# Patient Record
Sex: FEMALE | Race: WHITE | NOT HISPANIC OR LATINO | Employment: UNEMPLOYED | URBAN - METROPOLITAN AREA
[De-identification: names, ages, dates, MRNs, and addresses within clinical notes are randomized per-mention and may not be internally consistent; named-entity substitution may affect disease eponyms.]

---

## 2017-03-20 ENCOUNTER — LAB REQUISITION (OUTPATIENT)
Dept: LAB | Facility: HOSPITAL | Age: 22
End: 2017-03-20
Payer: COMMERCIAL

## 2017-03-20 ENCOUNTER — ALLSCRIPTS OFFICE VISIT (OUTPATIENT)
Dept: OTHER | Facility: OTHER | Age: 22
End: 2017-03-20

## 2017-03-20 DIAGNOSIS — R32 URINARY INCONTINENCE: ICD-10-CM

## 2017-03-20 DIAGNOSIS — R10.11 RIGHT UPPER QUADRANT PAIN: ICD-10-CM

## 2017-03-20 DIAGNOSIS — R10.2 PELVIC AND PERINEAL PAIN: ICD-10-CM

## 2017-03-20 LAB
CLARITY UR: NORMAL
COLOR UR: YELLOW
GLUCOSE (HISTORICAL): NORMAL
HGB UR QL STRIP.AUTO: NORMAL
KETONES UR STRIP-MCNC: NORMAL MG/DL
LEUKOCYTE ESTERASE UR QL STRIP: NORMAL
NITRITE UR QL STRIP: NORMAL
PH UR STRIP.AUTO: 6 [PH]
SP GR UR STRIP.AUTO: 1.03

## 2017-03-20 PROCEDURE — 87660 TRICHOMONAS VAGIN DIR PROBE: CPT | Performed by: NURSE PRACTITIONER

## 2017-03-20 PROCEDURE — 87086 URINE CULTURE/COLONY COUNT: CPT | Performed by: NURSE PRACTITIONER

## 2017-03-20 PROCEDURE — 87480 CANDIDA DNA DIR PROBE: CPT | Performed by: NURSE PRACTITIONER

## 2017-03-20 PROCEDURE — 87510 GARDNER VAG DNA DIR PROBE: CPT | Performed by: NURSE PRACTITIONER

## 2017-03-21 LAB
BACTERIA UR CULT: NORMAL
CANDIDA RRNA VAG QL PROBE: NEGATIVE
G VAGINALIS RRNA GENITAL QL PROBE: NEGATIVE
T VAGINALIS RRNA GENITAL QL PROBE: NEGATIVE

## 2017-03-24 ENCOUNTER — HOSPITAL ENCOUNTER (OUTPATIENT)
Dept: ULTRASOUND IMAGING | Facility: HOSPITAL | Age: 22
Discharge: HOME | End: 2017-03-24
Payer: COMMERCIAL

## 2017-03-24 DIAGNOSIS — R10.2 PELVIC AND PERINEAL PAIN: ICD-10-CM

## 2017-03-24 DIAGNOSIS — R32 URINARY INCONTINENCE: ICD-10-CM

## 2017-03-24 PROCEDURE — 76830 TRANSVAGINAL US NON-OB: CPT

## 2017-03-24 PROCEDURE — 76856 US EXAM PELVIC COMPLETE: CPT

## 2017-03-30 ENCOUNTER — ALLSCRIPTS OFFICE VISIT (OUTPATIENT)
Dept: OTHER | Facility: OTHER | Age: 22
End: 2017-03-30

## 2017-08-28 ENCOUNTER — LAB REQUISITION (OUTPATIENT)
Dept: LAB | Facility: HOSPITAL | Age: 22
End: 2017-08-28
Payer: COMMERCIAL

## 2017-08-28 ENCOUNTER — ALLSCRIPTS OFFICE VISIT (OUTPATIENT)
Dept: OTHER | Facility: OTHER | Age: 22
End: 2017-08-28

## 2017-08-28 DIAGNOSIS — Z01.419 ENCOUNTER FOR GYNECOLOGICAL EXAMINATION WITHOUT ABNORMAL FINDING: ICD-10-CM

## 2017-08-28 DIAGNOSIS — L83 ACANTHOSIS NIGRICANS: ICD-10-CM

## 2017-08-28 LAB
EST. AVERAGE GLUCOSE BLD GHB EST-MCNC: 117 MG/DL
HBA1C MFR BLD: 5.7 % (ref 4.2–6.3)

## 2017-08-28 PROCEDURE — 83036 HEMOGLOBIN GLYCOSYLATED A1C: CPT | Performed by: OBSTETRICS & GYNECOLOGY

## 2017-08-28 PROCEDURE — G0145 SCR C/V CYTO,THINLAYER,RESCR: HCPCS | Performed by: OBSTETRICS & GYNECOLOGY

## 2017-09-01 ENCOUNTER — GENERIC CONVERSION - ENCOUNTER (OUTPATIENT)
Dept: OTHER | Facility: OTHER | Age: 22
End: 2017-09-01

## 2017-09-05 LAB
LAB AP GYN PRIMARY INTERPRETATION: NORMAL
Lab: NORMAL

## 2017-09-07 ENCOUNTER — GENERIC CONVERSION - ENCOUNTER (OUTPATIENT)
Dept: OTHER | Facility: OTHER | Age: 22
End: 2017-09-07

## 2018-01-11 NOTE — RESULT NOTES
Verified Results  (Q) BV-VAGINITIS PANEL DNA PROBE [id number] 12:00AM Tommie Chisholm     Test Name Result Flag Reference   BV/VAGINITIS [facility] PROBE      BV/VAGINITIS PANEL DNA PROBE         MICRO NUMBER:      [id number]    TEST STATUS:       FINAL    SPECIMEN SOURCE:   VAGINAL/ANORECTAL    SPECIMEN QUALITY:  ADEQUATE    TRICHOMONAS:       Not Detected    GARDNERELLA:       Not Detected    KAREN:           Not Detected

## 2018-01-11 NOTE — PROGRESS NOTES
2016         RE: Ole Sheets                                To: Ob/Gyn Care   [facility]   MR#: [id number]                                   [address]   : Brianna Jernigan [facility], [address]: [id number]                             Fax: [phone]   (Exam #: [id number])      The LMP of this 21year old,  G1, P0-0-0-0 patient was unknown, her   working AUSTYN is SEP 1 2016 and the current gestational age is 26 weeks 6   days by 1st Trimester Sono  A sonographic examination was performed on 2016 using real time equipment  The ultrasound examination was   performed using abdominal technique  The patient has a BMI of 39 0  Her   blood pressure today was 120/70  MFM ultrasound 16   13w4d   16 AUSTYN      Cardiac motion was observed at 132 bpm       INDICATIONS      Interval growth assesment   hypothyroidism   obesity   size greater than dates      Exam Types      Level I      RESULTS      Fetus # 1 of 1   Vertex presentation   Fetal growth appeared normal   Placenta Location = Anterior   No placenta previa   Placenta Grade = II      MEASUREMENTS (* Included In Average GA)      AC              29 2 cm        33 weeks 2 days* (53%)   BPD              8 8 cm        35 weeks 2 days* (85%)   HC              30 5 cm        33 weeks 4 days* (45%)   Femur            6 3 cm        32 weeks 4 days* (46%)      Cerebellum       4 1 cm        34 weeks 1 day      HC/AC           1 04   FL/AC           0 22   FL/BPD          0 72   EFW (Ac/Fl/Hc)  2132 grams - 4 lbs 11 oz                 (47%)      THE AVERAGE GESTATIONAL AGE is 33 weeks 5 days +/- 21 days        AMNIOTIC FLUID      Q1: 2 9      Q2: 4 9      Q3: 1 7      Q4: 4 2   NINA Total = 13 8 cm   Amniotic Fluid: Normal      ANATOMY DETAILS      Visualized Appearing Sonographically Normal:   HEAD: (Calvarium, BPD Level, Cavum, Lateral Ventricles, Choroid Plexus, Cerebellum, Cisterna Magna);    TH  CAV : (Diaphragm); HEART: (Four   Chamber View, Interventricular Septum, Interatrial Septum, Cardiac Axis,   Cardiac Position);    STOMACH, RIGHT KIDNEY, LEFT KIDNEY, BLADDER, PLACENTA      IMPRESSION      Whyte IUP   33 weeks and 5 days by this ultrasound  (AUSTYN=AUG 26 2016)   32 weeks and 6 days by 1st Tri Sono  (AUSTYN=SEP 1 2016)   Vertex presentation   Fetal growth appeared normal   Regular fetal heart rate of 132 bpm   Anterior placenta   No placenta previa      GENERAL COMMENT         I had the pleasure of seeing Ashley Park  in the [address] [place] [address] today   for followup growth scan  She reports normal daily fetal movements  She   denies any vaginal bleeding, leakage of fluid, or significant contractions   or pelvic pressure  There has been no major pregnancy complications since   her last visit here in the  Center  She is hypothyroid and is   maintained on Synthroid  She does have a BMI of 39  She denies any   pregnancy complications  On today's ultrasound, the fetus was in a vertex presentation  The   amniotic fluid appeared very normal today  Fetal growth was within normal   range  The overall size of the fetus was at the 47th percentile and the   abdominal circumference was at the 53rd percentile which are both very   normal  The interval anatomy appeared very appropriate with no obvious   anomalies seen today  We discussed the importance of initiating fetal kick counting at least   once daily  We discussed the "10 kicks in 2 hour rule"  I instructed her   to report to you immediately should criteria not be met  Kick counts   should begin at 28 weeks gestation  No further ultrasounds are scheduled for Anila at this time  Certainly we   be happy to see her again if there are any concerns during her pregnancy  I wished her well for safe and healthy delivery  Total face-to-face time   with the patient, excluding ultrasound time was 10 minutes with more than   50% of the time devoted to counseling and coordination of care  MANUEL Wu M D     Maternal-Fetal Medicine   Electronically signed 07/13/16 09:21

## 2018-01-11 NOTE — MISCELLANEOUS
Message  Return to work or school:   Nina Myrick is under my professional care  She was seen in my office on 06/23/2016  Please allow patient to use bathroom as needed  AUSTYN 09/01/2016               Signatures   Electronically signed by : CLIFTON Santos ; Jun 23 2016  3:40PM EST                       (Author)

## 2018-01-13 NOTE — MISCELLANEOUS
Message  Return to work or school:      She is able to return to school on 08/24/2016  Patient is able to attend school but is unable to perform any work during clinicals  Patient can observe but must be sitting  Patient is s/p vaginal deliver 08/17/2016  Weight Bearing Status: No Weight-Bearing           Signatures   Electronically signed by : Tarik Cam, ; Aug 29 2016 10:18AM EST                       (Author)

## 2018-01-14 VITALS
SYSTOLIC BLOOD PRESSURE: 140 MMHG | DIASTOLIC BLOOD PRESSURE: 70 MMHG | BODY MASS INDEX: 34.2 KG/M2 | WEIGHT: 205.25 LBS | HEIGHT: 65 IN

## 2018-01-14 VITALS
WEIGHT: 198 LBS | DIASTOLIC BLOOD PRESSURE: 72 MMHG | BODY MASS INDEX: 32.99 KG/M2 | SYSTOLIC BLOOD PRESSURE: 112 MMHG | HEIGHT: 65 IN

## 2018-01-14 VITALS
SYSTOLIC BLOOD PRESSURE: 118 MMHG | DIASTOLIC BLOOD PRESSURE: 80 MMHG | WEIGHT: 203.44 LBS | BODY MASS INDEX: 34.38 KG/M2

## 2018-01-15 NOTE — PROGRESS NOTES
Chief Complaint  The patient was in today for her tsh blood draw  Active Problems    1  Bacterial vaginosis (616 10,041 9) (N76 0,B96 89)   2  Candidiasis (112 9) (B37 9)   3  Contraception management (V25 9) (Z30 9)   4  First trimester bleeding (640 93) (O20 9)   5  Hypothyroidism (244 9) (E03 9)   6  Nexplanon removal (V25 43) (Z30 49)   7  Pregnancy (V22 2) (Z33 1)    Current Meds   1  Fluconazole 150 MG Oral Tablet; take one tablet now and repeat in 4 days; Therapy: 21Apr2016 to (Evaluate:23Apr2016)  Requested for: 21Apr2016; Last   Rx:21Apr2016 Ordered   2  Levothyroxine Sodium 100 MCG Oral Tablet; TAKE 1 TABLET DAILY; Therapy: 00Ktx8981 to (Evaluate:25Rej3645)  Requested for: 15Apr2016; Last   Rx:15Apr2016 Ordered   3  Levothyroxine Sodium 75 MCG Oral Tablet; TAKE 1 TABLET DAILY AS DIRECTED; Therapy: (Recorded:15Apr2016) to Recorded   4  Prenatal Vitamins 0 8 MG Oral Tablet; TAKE 1 TABLET DAILY; Therapy: 99OAK1200 to (Evaluate:14Jan2017)  Requested for: 20Jan2016; Last   Rx:20Jan2016 Ordered    Allergies    1  No Known Drug Allergies    2  Seasonal    Plan  Hypothyroidism, Pregnancy    · (1) TSH; Status:Active;  Requested for:05May2016;     Future Appointments    Date/Time Provider Specialty Site   05/19/2016 11:30 AM STEPHANIE Wall[facility]     Signatures   Electronically signed by : CLIFTON Musa ; May  5 2016  3:56PM EST                       (Author)

## 2018-01-15 NOTE — MISCELLANEOUS
Message  Message Free Text Note Form: The pt paged shortly before midnight crying reporting that she was walking up the stairs and tripped and hit her abdomen fairly hard  She reports she is 37 weeks  She denies vb or lof, but reports she doesn't really feel the baby moving very well  The pt was advised to come to the hospital for evaluation and prolonged monitoring  L&D notified of the same        Signatures   Electronically signed by : CLIFTON Castanon ; Aug 13 2016  8:58PM EST                       (Author)

## 2018-01-15 NOTE — PROGRESS NOTES
APR 15 2016         RE: Camille Singletary                                To: Ob/Gyn Care   [facility]   MR#: [id number]                                   [address] [facility] [address]   : [address], [address] [place] [address]    ENC: [id number]                             Fax: [phone]   (Exam #: [id number])      The LMP of this 21year old,  1, para 0 patient was unknown, her   working AUSTYN is SEP 1 2016 and the current gestational age is 25 weeks 1   day by [address] [place] [address]  A sonographic examination was performed on APR   15 2016 using real time equipment  The ultrasound examination was   performed using abdominal & vaginal techniques  The patient has a BMI of   34 0  Her blood pressure today was 121/63        MFM ultrasound 16   13w4d   16 AUSTYN      Cardiac motion was observed at 172 bpm       INDICATIONS      fetal anatomical survey   hypothyroidism   obesity      Exam Types      LEVEL II   Transvaginal      RESULTS      Fetus # 1 of 1   Vertex presentation   Fetal growth appeared normal   Placenta Location = Anterior   No placenta previa   Placenta Grade = I      MEASUREMENTS (* Included In Average GA)      AC              17 0 cm        21 weeks 5 days* (83%)   BPD              5 3 cm        21 weeks 6 days* (>95%)   HC              19 0 cm        21 weeks 1 day * (78%)   Femur            3 5 cm        21 weeks 2 days* (67%)      Nuchal Fold      5 6 mm      Humerus          3 5 cm        21 weeks 6 days  (91%)   Radius           3 1 cm        23 weeks 1 day   Ulna             3 3 cm        22 weeks 4 days   Tibia            3 1 cm        21 weeks 4 days  (86%)   Fibula           3 2 cm        20 weeks 5 days   Foot             3 4 cm        20 weeks 3 days      Cerebellum       2 2 cm        21 weeks 4 days   Biorbit          3 4 cm        21 weeks 4 days   CisternaMagna    5 4 mm      HC/AC           1 12   FL/AC           0 21 FL/BPD          0 67   EFW (Ac/Fl/Hc)   429 grams - 0 lbs 15 oz      THE AVERAGE GESTATIONAL AGE is 21 weeks 4 days +/- 10 days  AMNIOTIC FLUID      Largest Vertical Pocket = 4 4 cm   Amniotic Fluid: Normal      ANATOMY      Head                                    Normal   Face/Neck                               Normal   Th  Cav  Normal   Heart                                   Normal   Abd  Cav  Normal   Stomach                                 Normal   Right Kidney                            Normal   Left Kidney                             Normal   Bladder                                 Normal   Abd  Wall                               Normal   Spine                                   Normal   Extrems                                 Normal   Genitalia                               Normal   Placenta                                Normal   Umbl  Cord                              Normal   Uterus                                  Normal      ANATOMY DETAILS      Visualized Appearing Sonographically Normal:   HEAD: (Calvarium, BPD Level, Lateral Ventricles, Choroid Plexus,   Cerebellum, Cisterna Magna);    FACE/NECK: (Neck, Nuchal Fold, Profile,   Orbits, Nose/Lips, Palate, Face);    TH  CAV : (Diaphragm); HEART:   (Four Chamber View, Proximal Left Outflow, Proximal Right Outflow, Aortic   Arch, Ductal Arch, Short Axis of Greater Vessels, Cardiac Axis,   Interventricular Septum, Interatrial Septum, Cardiac Position);    ABD    CAV , STOMACH, RIGHT KIDNEY, LEFT KIDNEY, BLADDER, ABD  WALL, SPINE:   (Cervical Spine, Thoracic Spine, Lumbar Spine, Sacrum);    EXTREMS: (Lt   Humerus, Rt Humerus, Lt Forearm, Rt Forearm, Lt Hand, Rt Hand, Lt Femur,   Rt Femur, Lt Low Leg, Rt Low Leg, Lt Foot, Rt Foot);    GENITALIA,   PLACENTA, UMBL   CORD, UTERUS      ANATOMY COMMENTS      No fetal structural abnormality or ultrasound marker for aneuploidy is   identified on the Level II ultrasound study today  The genitalia were   reviewed and found to be male  The patient is  aware of the results of the   fetal sex  Fetal growth and amniotic fluid volume appear normal   Active   movement of the fetal body & extremities was seen  There is no suspicion   of a subchorionic bleed  The placental cord insertion was normal   The   cervix seen by transvaginal scan appears normal measuring 34 mms  There is   no evidence for spontaneous funneling of the cervix seen  ADNEXA      The left ovary appeared normal and measured 2 8 x 2 3 x 2 3 cm with a   volume of 7 7 cc  The right ovary appeared normal and measured 3 0 x 2 3 x   1 7 cm with a volume of 6 1 cc  IMPRESSION      Whyte IUP   21 weeks and 4 days by this ultrasound  (AUSTYN=AUG 22 2016)   20 weeks and 1 day by 1st Tri Sono  (AUSTYN=SEP 1 2016)   Vertex presentation   Fetal growth appeared normal   Normal anatomy survey   Regular fetal heart rate of 172 bpm   Anterior placenta   No placenta previa      GENERAL COMMENT         I had the pleasure of seeing Myke Live in the  Center on   April 15 for a level II ultrasound  She is currently 20 weeks and one day   with a working due date of   She denies any pregnancy   complications  She does have a history of hypothyroidism  She denies any   pregnancy complications  Today's ultrasound was reassuring  A viable fetus was seen in the    presentation  The placenta is anterior in location and there is no   evidence of a placenta previa  Amniotic fluid appeared normal  Fetal   growth appeared very appropriate and correlated well with her due date  There were no obvious anomalies seen today  The anatomic survey was   completed today  There were no Down syndrome markers seen  Both maternal   ovaries were seen and appeared normal  There were no obvious subchorionic   hematomas or uterine myomas    Her cervix was seen transvaginally and   appeared normal in length with no evidence of funneling or dynamic change  The placental cord insertion were seen and was normal in location  The patient appeared well-nourished, well-developed, and in no apparent   distress  Her uterus is nontender  Her abdomen was gravid and nontender  The anatomic survey was completed today and there were no obvious anatomic   abnormalities  We discussed kick counts in the office today  We discussed the 10 kicks in   2 hour rule  I asked her to call your office if criteria are not met  She   should continue to do her kick counts on a daily basis  Kick counts should   begin at 28 weeks  MISSED ANATOMY: None      NEW FINDINGS ON TODAY'S ULTRASOUND: None, very reassuring ultrasound today   in the  Center         IN SUMMARY:  Today's ultrasound is very reassuring  The fetus appears to   be growing well and today's ultrasound correlates very well with her due   date  There were no obvious anomalies nor any Down syndrome markers  Her   cervix was long and closed as seen transvaginally  Given the normalcy of   today's exam, no further ultrasounds are scheduled for your patient in the   future  Certainly we be happy to see her in the future if it clinical   situation arises but I will leave that to your discretion  Thank you   kindly for this referral                Total face-to-face time with the patient, excluding ultrasound time was 15   minutes with more than 50% of the time devoted to counseling and   coordination of care  Thank you very much for allowing me to participate in the care of your   patient  If you have any questions or concerns about today's visit, please   do not hesitate to call me  Sincerely,         CLIFTON Ascencio  Maternal Fetal Medicine      MANUEL Pollock S , MANUEL GARCIA S  CLIFTON Ascencio     Maternal-Fetal Medicine   Electronically signed 16 15:32

## 2018-01-15 NOTE — MISCELLANEOUS
Message  Message Free Text Note Form: late entry from 8/17 1350: pt notes leakage of fluid from vagina despite wiping  Runs down leg  Mild contractions   Advised to proceed to L&D for eval  BEO      Signatures   Electronically signed by : CLIFTON Wallace ; Aug 26 2016 11:35AM EST                       (Author)

## 2018-01-16 NOTE — PROGRESS NOTES
2016         RE: Kellen Michael                                To: Ob/Gyn Care   [facility]   MR#: [id number]                                   [address]   : [address]   ENC:                                              Fax: [phone]   (Exam #: [id number])      The LMP of this 21year old,  1, para 0 patient was unknown, her   working AUSTYN is SEP 1 2016 and the current gestational age is 17 weeks 1   day by [address] [place] [address]  A sonographic examination was performed on 2016 using real time equipment  The ultrasound examination was   performed using abdominal technique  The patient has a BMI of 31 8  Her   blood pressure today was 120/56  Thank you very much for referring this very nice patient for a    consultation and genetic screening ultrasound  This is the patient's   first pregnancy ended is complicated by hypothyroidism  The patient had a   recent TSH level VI 88  Her primary care physician increased her   levothyroxine from 50 mcg daily to 75 mcg daily  She has a history of   knee surgery  She denies current use of tobacco, alcohol, or drugs  She   states prenatal vitamins and has no known drug allergies  Her family   medical history is unremarkable  A review of systems is otherwise   negative  On exam, the patient appears well, in no acute distress, and her   abdomen is nontender  Multiple longitudinal and transverse sections revealed a luo   intrauterine pregnancy with the fetus in vertex presentation  The placenta   is anterior in implantation, grade 0 in appearance        Cardiac motion was observed at 157 bpm       INDICATIONS      first trimester genetic screening   dating   hypothyroidism   obesity      Exam Types      Level I      RESULTS      Fetus # 1 of 1   Vertex presentation      MEASUREMENTS (* Included In Average GA)      CRL 7 8 cm        13 weeks 4 days*   Nuchal Trans    1 90 mm      THE AVERAGE GESTATIONAL AGE is 13 weeks 4 days +/- 7 days  UTERINE ARTERIES                                  S/D   PI    RI    NOTCH       Left Uterine Artery        2 88  1 21  0 65       Right Uterine Artery       7 23  2 81  0 86      ANATOMY COMMENTS      Anatomic detail is limited at this gestational age  The yolk sac was not   noted  The fetal cranium appeared normal in shape and the nuchal   translucency was normal in size (1 9mm)  The nasal bone appears to be   present  The intracranial anatomy was unremarkable  Evaluation of the   spine revealed no obvious evidence for a neural tube defect  Anatomy of   the fetal thorax appeared within normal limits  The cardiac rhythm was   regular  Within the abdomen, stomach & bladder were visualized and the   abdominal wall appeared intact  A three vessel cord appears to be present  Active movement of the fetal body & extremities was seen  There is no   suspicion of a subchorionic bleed  The placental cord insertion was   normal    The uterine artery Dopplers are normal for this gestational age  There is no suspicion of a uterine myoma  Free fluid is not seen in the   posterior cul-de-sac  AMNIOTIC FLUID      Largest Vertical Pocket = 3 0 cm   Amniotic Fluid: Normal      IMPRESSION      Whyte IUP   13 weeks and 4 days by this ultrasound  (AUSTYN=AUG 29 2016)   13 weeks and 1 day by 1st Tri Sono  (AUSTYN=SEP 1 2016)   Vertex presentation   Regular fetal heart rate of 157 bpm   Anterior placenta      GENERAL COMMENT      We discussed the options for genetic screening, including but not limited   to first trimester screening, second trimester screening, combined first   and second trimester screening, noninvasive prenatal testing (NIPT) for   patients at high risk and diagnostic screening through the use of CVS and   amniocentesis    We discussed the risks and benefits of each approach including the sensitivities and false positive rates as well as the   difference between a screening test and a diagnostic test   At the   conclusion of our discussion the patient elected Sequential Screening to   delineate her risk for fetal aneuploidy  A maternal blood sample was   obtained on the day of sonogram   The first trimester portion of the   screening results, encompassing age, nuchal translucency, and biochemistry   should be available within one week of testing and will be reported from   [facility]   The second stage of sequential screening should be completed   between the 15th and 21st week of pregnancy (ideally between 16-18 weeks)  The patient and I discussed her current state of hypothyroidism with a TSH   level of 6 88  I do not believe her levothyroxine was increased in off   and I have asked her to take 100 mcg daily  Given that she recently   failed her 75 mcg dose, highest her to take 2 extra doses throughout the   week, so that she would take 75 mcg daily with 150 mcg on 2 separate days   and they can be Saturday and  out of convenience  I recommend   repeating her TSH within the next month and adjusting her Synthroid dose   to a TSH level with the goal between 1 5 and 2 5  I also recommend   drawing a TSH level every 4-6 weeks and adjusting her Synthroid dose   accordingly as pregnancy can significantly increase your requirements for   thyroid hormones especially during the second trimester  As long as the   patient's hypothyroidism is well controlled, she should not have any   significant increased risk of adverse pregnancy outcome  Poorly   controlled maternal hypothyroidism does place  the pregnancy at risk for   preeclampsia, placental abruption, nonreassuring fetal heart rate,    birth, low birthweight,  morbidity and mortality,   neuropsychological and cognitive impairment, and postpartum hemorrhage      We discussed these risks and the importance of medication adherence to   achieve euthyroidism  As long as the patient's hypothyroidism is well   controlled and no issues arise during the pregnancy, no deviation from   normal labor and delivery is required  The implications of obesity and pregnancy are significant  The level of   obesity is directly related to the risk of adverse pregnancy outcomes   including but not limited to, risk of diabetes, hypertensive disorders of   pregnancy, macrosomia, intrauterine growth restriction, labor and shoulder   dystocias,  section, and increased risk of stillbirth  Recommend   discussing the current weight gain recommendations for women with obesity   and discussing good dietary practices as well as the safety of exercise in   pregnancy  I recommend the patient gain no more than 15 pounds throughout   her entire pregnancy, increase her exercise and follow healthy dietary   habits  Consider referral to a dietitian should the patient have   difficulty following the aforementioned recommendations  Recommend third   trimester growth ultrasounds to screen for fetal growth problems as well   as ensuring the patient is appropriately screened for pregestational and   gestational diabetes  Recommend an anatomy ultrasound be scheduled for 20 weeks gestation  Please note, in addition to the time spent discussing the results of the   ultrasound, I spent approximately 15 minutes of face-to-face time with the   patient, greater than 50% of which was spent in counseling and the   coordination of care for this patient  Thank you very much for allowing us to participate in the care of this   very nice patient  Should you have any questions, please do not hesitate   to contact our office  MANUEL Maynard M D     Electronically signed 16 16:26

## 2018-01-17 NOTE — RESULT NOTES
Verified Results  (1) HEMOGLOBIN A1C [id number] 05:21PM Radha Mendes     Test Name Result Flag Reference   HEMOGLOBIN A1C 5 7 %  4 2-6 3   EST  AVG   GLUCOSE 117 mg/dl

## 2018-01-17 NOTE — RESULT NOTES
Verified Results  (1) THIN PREP PAP WITH IMAGING [id number] 09:30AM Ruben Modi     Test Name Result Flag Reference   LAB AP CASE REPORT (Report)     Gynecologic Cytology Report            Case: [id number]                  Authorizing Provider: Ambrose Schlatter, MD     Collected:      08/28/2017           First Screen:     MELISSA Max    Received:      08/29/2017 0840        Rescreen:       MELISSA Morris                             Specimen:  LIQUID-BASED PAP, SCREENING, Endocervical   LAB AP GYN PRIMARY INTERPRETATION      Negative for intraepithelial lesion or malignancy  Electronically signed by MELISSA Morris on 9/5/2017 at 9:30 AM   LAB AP GYN SPECIMEN ADEQUACY      Satisfactory for evaluation  Endocervical/transformation zone component present  LAB AP GYN ADDITIONAL INFORMATION (Report)     [employer] FDA approved ,  and ThinPrep Imaging System are   utilized with strict adherence to the 's instruction manual to   prepare gynecologic and non-gynecologic cytology specimens for the   production of ThinPrep slides as well as for gynecologic ThinPrep imaging  These processes have been validated by our laboratory and/or by the     The Pap test is not a diagnostic procedure and should not be used as the   sole means to detect cervical cancer  It is only a screening procedure to   aid in the detection of cervical cancer and its precursors  Both   false-negative and false-positive results have been experienced  Your   patient's test result should be interpreted in this context together with   the history and clinical findings

## 2018-01-18 NOTE — MISCELLANEOUS
Message  Pt called in stating that she is bleeding red blood about the amount of a period and is having cramping  Per Diana Wallace she is going to have a HCG level drawn  Active Problems    1  Contraception management (V25 9) (Z30 9)   2  First trimester bleeding (640 93) (O20 9)   3  Hypothyroidism (244 9) (E03 9)   4  Nexplanon removal (V25 43) (Z30 8)   5  Pregnancy (V22 2) (Z33 1)    Current Meds   1  Prenatal Vitamins 0 8 MG Oral Tablet; TAKE 1 TABLET DAILY; Therapy: [id number] to (Evaluate:14Jan2017)  Requested for: 20Jan2016; Last   Rx:20Jan2016 Ordered    Allergies    1  No Known Drug Allergies    Plan  First trimester bleeding    · (1) HCG QUANT; Status:Active - Retrospective By Protocol Authorization;  Requested  for:[id number];     Signatures   Electronically signed by : CLIFTON Vera ; Feb 2 2016 10:19AM EST

## 2018-01-18 NOTE — MISCELLANEOUS
Message  Patient is under my professional care  Please allow patient to lift no more than 25 lbs  Any questions, please contact us at [phone]  Signatures   Electronically signed by :  Nai Crow, ; Jul 20 2016 11:02AM EST                       (Co-author)

## 2019-07-24 ENCOUNTER — TELEPHONE (OUTPATIENT)
Dept: OBGYN CLINIC | Facility: MEDICAL CENTER | Age: 24
End: 2019-07-24

## 2019-07-24 NOTE — TELEPHONE ENCOUNTER
----- Message from Sammy Thompson sent at 7/24/2019 10:44 AM EDT -----  Regarding: RE: nexplanon removal  I contacted pt's insurance  Effective 7/31/15  Pt is covered for removal at 100%  No PA needed  [place]  7/24/19 at 1045  lmovm for pt to cb  Please schedule appt when pt calls with either [facility] or Dr Osborne Found who pt has seen In the past        ----- Message -----  From: Sebas Rodriguez MA  Sent: 7/18/2019   1:49 PM EDT  To: Sammy Thompson  Subject: nexplanon removal                                Pt would like her nexplanon removed and does not want a new one put in  Would like to discuss other options  Please authorize for removal  Thank you!

## 2019-09-10 ENCOUNTER — PROCEDURE VISIT (OUTPATIENT)
Dept: OBGYN CLINIC | Facility: MEDICAL CENTER | Age: 24
End: 2019-09-10
Payer: COMMERCIAL

## 2019-09-10 VITALS — SYSTOLIC BLOOD PRESSURE: 142 MMHG | WEIGHT: 201 LBS | DIASTOLIC BLOOD PRESSURE: 90 MMHG | BODY MASS INDEX: 33.97 KG/M2

## 2019-09-10 DIAGNOSIS — IMO0001 CONTRACEPTION: ICD-10-CM

## 2019-09-10 DIAGNOSIS — Z30.46 ENCOUNTER FOR NEXPLANON REMOVAL: Primary | ICD-10-CM

## 2019-09-10 PROCEDURE — 11982 REMOVE DRUG IMPLANT DEVICE: CPT | Performed by: OBSTETRICS & GYNECOLOGY

## 2019-09-10 NOTE — PROGRESS NOTES
Nexplanon removal discussed in detail  Risks of infection, bleeding, nerve injury all reviewed  Patient understands risks and desires to proceed  Verbal consent obtained  Patient is 100% sure she wants the implanon removed  All questions answered  Procedure:  Patient placed in dorsal supine with left arm above head, elbow flexed at 90 degrees, arm resting on examination table  Nexplanon identified without problems  Betadine scrub x3   1 ml of 1% lidocaine injected under implanon device without problems  Sterile gloves applied  Small 0 5cm incision made at distal tip of Nexplanon device with 11 blade scalpel  Nexplanon brought to incision and grasped with a small wai clamp  Implanon removed intact without problems  Pressure applied to incision  Hemostasis obtained  Steri-strips applied, followed by bandage and compression dressing  Patient tolerated procedure well  No complications  Patient requesting oral contraceptive for [place]  Script for Lo Loestrin sent to pharmacy

## 2019-09-10 NOTE — PATIENT INSTRUCTIONS
Thank you for your confidence in our team    We appreciate you and welcome your feedback  If you receive a survey from us, please take a few moments to let us know how we are doing     Sincerely,   Lindsey Gonzalez MD

## 2020-07-13 ENCOUNTER — TELEPHONE (OUTPATIENT)
Dept: OBGYN CLINIC | Facility: MEDICAL CENTER | Age: 25
End: 2020-07-13

## 2020-07-13 DIAGNOSIS — N91.2 AMENORRHEA: Primary | ICD-10-CM

## 2020-08-04 LAB
B-HCG SERPL-ACNC: ABNORMAL MIU/ML
PROGEST SERPL-MCNC: 10.2 NG/ML

## 2020-08-26 DIAGNOSIS — Z32.01 POSITIVE BLOOD PREGNANCY TEST: Primary | ICD-10-CM

## 2020-08-27 ENCOUNTER — HOSPITAL ENCOUNTER (OUTPATIENT)
Dept: ULTRASOUND IMAGING | Facility: HOSPITAL | Age: 25
Discharge: HOME | End: 2020-08-27
Payer: COMMERCIAL

## 2020-08-27 DIAGNOSIS — Z32.01 POSITIVE BLOOD PREGNANCY TEST: ICD-10-CM

## 2020-08-27 PROCEDURE — 76801 OB US < 14 WKS SINGLE FETUS: CPT

## 2020-08-31 ENCOUNTER — TELEPHONE (OUTPATIENT)
Dept: OBGYN CLINIC | Facility: MEDICAL CENTER | Age: 25
End: 2020-08-31

## 2020-09-10 ENCOUNTER — INITIAL PRENATAL (OUTPATIENT)
Dept: OBGYN CLINIC | Facility: MEDICAL CENTER | Age: 25
End: 2020-09-10

## 2020-09-10 DIAGNOSIS — Z34.91 ENCOUNTER FOR PREGNANCY RELATED EXAMINATION IN FIRST TRIMESTER: Primary | ICD-10-CM

## 2020-09-10 DIAGNOSIS — O99.281 HYPOTHYROID IN PREGNANCY, ANTEPARTUM, FIRST TRIMESTER: ICD-10-CM

## 2020-09-10 DIAGNOSIS — E03.9 HYPOTHYROID IN PREGNANCY, ANTEPARTUM, FIRST TRIMESTER: ICD-10-CM

## 2020-09-10 PROCEDURE — OBC

## 2020-09-10 RX ORDER — NORETHINDRONE ACETATE AND ETHINYL ESTRADIOL, ETHINYL ESTRADIOL AND FERROUS FUMARATE 1MG-10(24)
1 KIT ORAL DAILY
COMMUNITY
Start: 2020-06-26 | End: 2020-09-10

## 2020-09-10 RX ORDER — LEVOTHYROXINE SODIUM 88 UG/1
88 TABLET ORAL DAILY
COMMUNITY
Start: 2020-07-09 | End: 2021-07-09

## 2020-09-10 NOTE — PROGRESS NOTES
OB INTAKE INTERVIEW      Pt presents for OB intake    OB History    Para Term  AB Living   2 1 1     1   SAB TAB Ectopic Multiple Live Births         0 1      # Outcome Date GA Lbr Rohan/2nd Weight Sex Delivery Anes PTL Lv   2 Current            1 Term 16 37w6d  3147 g (6 lb 15 oz) M Vag-Spont EPI N KALEE         Hx of  delivery prior to 36 weeks 6 days:  NO   Last Menstrual Period:   Patient's last menstrual period was 2020  Ultrasound date:   2020 10 weeks 4 days  Estimated date of delivery:   Estimated Date of Delivery: 2021  confirmed by LMP   ? History of Diabetes: NO  History of Hypertension: NO      Infection Screening: Does the pt have a hx of MRSA? NO    H&P visit scheduled  ?  Interview education  Information on [facility] Pregnancy Essentials reviewed  Handouts given: Baby and Me support center  [facility] in Pregnancy information sheet   COVID-19: precautions and travel restrictions reviewed    Interview education    [facility] M[facility]  Discussed genetic testing-    - pt interested   In seq  Screen if covered by health insurance  CF carrier screening completed with previous pregnancy  Information on SMA carrier screening reviewed-patient will let office know at next appointment if screening is desired            Discussed Tdap and Influenza vaccines         Depression Screening Follow-up Plan: Patient's depression screening was Negative  with an [place] score of  0                  The patient was oriented to our practice and all questions were answered    Interviewed by: Guillermina Win RN 09/10/20

## 2020-09-16 LAB
ABO GROUP BLD: ABNORMAL
APPEARANCE UR: CLEAR
BASOPHILS # BLD AUTO: 17 CELLS/UL (ref 0–200)
BASOPHILS NFR BLD AUTO: 0.2 %
BLD GP AB SCN SERPL QL: ABNORMAL
COLOR UR: YELLOW
EOSINOPHIL # BLD AUTO: 34 CELLS/UL (ref 15–500)
EOSINOPHIL NFR BLD AUTO: 0.4 %
ERYTHROCYTE [DISTWIDTH] IN BLOOD BY AUTOMATED COUNT: 12.1 % (ref 11–15)
GLUCOSE UR QL STRIP: NEGATIVE
HBV SURFACE AG SERPL QL IA: ABNORMAL
HCT VFR BLD AUTO: 34.6 % (ref 35–45)
HGB BLD-MCNC: 11.8 G/DL (ref 11.7–15.5)
HGB UR QL STRIP: NEGATIVE
HIV 1+2 AB+HIV1 P24 AG SERPL QL IA: ABNORMAL
KETONES UR QL STRIP: NEGATIVE
LYMPHOCYTES # BLD AUTO: 966 CELLS/UL (ref 850–3900)
LYMPHOCYTES NFR BLD AUTO: 11.5 %
MCH RBC QN AUTO: 31 PG (ref 27–33)
MCHC RBC AUTO-ENTMCNC: 34.1 G/DL (ref 32–36)
MCV RBC AUTO: 90.8 FL (ref 80–100)
MONOCYTES # BLD AUTO: 554 CELLS/UL (ref 200–950)
MONOCYTES NFR BLD AUTO: 6.6 %
NEUTROPHILS # BLD AUTO: 6829 CELLS/UL (ref 1500–7800)
NEUTROPHILS NFR BLD AUTO: 81.3 %
PH UR STRIP: 7 [PH] (ref 5–8)
PLATELET # BLD AUTO: 325 THOUSAND/UL (ref 140–400)
PMV BLD REES-ECKER: 10.2 FL (ref 7.5–12.5)
PROT UR QL STRIP: NEGATIVE
RBC # BLD AUTO: 3.81 MILLION/UL (ref 3.8–5.1)
RH BLD: ABNORMAL
RPR SER QL: ABNORMAL
RUBV IGG SERPL IA-ACNC: 2.96 INDEX
SP GR UR STRIP: 1 (ref 1–1.03)
TSH SERPL-ACNC: 2.66 MIU/L
WBC # BLD AUTO: 8.4 THOUSAND/UL (ref 3.8–10.8)

## 2020-09-25 ENCOUNTER — INITIAL PRENATAL (OUTPATIENT)
Dept: OBGYN CLINIC | Facility: MEDICAL CENTER | Age: 25
End: 2020-09-25

## 2020-09-25 VITALS
TEMPERATURE: 97.3 F | WEIGHT: 213 LBS | BODY MASS INDEX: 36 KG/M2 | SYSTOLIC BLOOD PRESSURE: 124 MMHG | DIASTOLIC BLOOD PRESSURE: 80 MMHG

## 2020-09-25 DIAGNOSIS — Z34.92 SECOND TRIMESTER PREGNANCY: ICD-10-CM

## 2020-09-25 DIAGNOSIS — Z11.3 SCREENING EXAMINATION FOR STD (SEXUALLY TRANSMITTED DISEASE): ICD-10-CM

## 2020-09-25 DIAGNOSIS — Z3A.14 14 WEEKS GESTATION OF PREGNANCY: Primary | ICD-10-CM

## 2020-09-25 PROCEDURE — PNV: Performed by: NURSE PRACTITIONER

## 2020-09-25 NOTE — PROGRESS NOTES
Daiana Carmona is a 25y o  year old  at 14w5d for first prenatal visit  Pregnancy was desired  She is currently taking PNV    Nausea No Vomiting No   Exam done today - see OB flowsheet  Pap done Yes  Gonorrhea and Chlamydia sent  Labs reviewed  Normal tsh in September  Genetic testing declines  Given order to schedule Level 2 US  Declines cf and sma, not covered by insurance  OB complications : obesity    Pt has been counseled re diet, exercise, weight gain, foods to avoid, vaccines in pregnancy, trisomy screening, travel precautions to include seat belt use and VTE risk reduction  She has been provided our pregnancy packet which includes how and when to contact providers, medication recommendations, dietary suggestions, breastfeeding information as well as websites for additional information, hospital and delivery concerns

## 2020-09-28 LAB
C TRACH RRNA SPEC QL NAA+PROBE: NOT DETECTED
N GONORRHOEA RRNA SPEC QL NAA+PROBE: NOT DETECTED

## 2020-09-29 LAB
CLINICAL INFO: NORMAL
CYTO CVX: NORMAL
DATE PREVIOUS BX: NORMAL
LMP START DATE: NORMAL
SL AMB PREV. PAP:: NORMAL
SPECIMEN SOURCE CVX/VAG CYTO: NORMAL

## 2020-10-15 PROBLEM — Z3A.17 17 WEEKS GESTATION OF PREGNANCY: Status: ACTIVE | Noted: 2020-10-15

## 2020-10-19 ENCOUNTER — ROUTINE PRENATAL (OUTPATIENT)
Dept: OBGYN CLINIC | Facility: MEDICAL CENTER | Age: 25
End: 2020-10-19

## 2020-10-19 VITALS — BODY MASS INDEX: 37.01 KG/M2 | DIASTOLIC BLOOD PRESSURE: 80 MMHG | SYSTOLIC BLOOD PRESSURE: 140 MMHG | WEIGHT: 219 LBS

## 2020-10-19 DIAGNOSIS — Z34.82 PRENATAL CARE, SUBSEQUENT PREGNANCY, SECOND TRIMESTER: Primary | ICD-10-CM

## 2020-10-19 PROCEDURE — PNV: Performed by: OBSTETRICS & GYNECOLOGY

## 2020-11-06 ENCOUNTER — TELEPHONE (OUTPATIENT)
Dept: PERINATAL CARE | Facility: CLINIC | Age: 25
End: 2020-11-06

## 2020-11-09 ENCOUNTER — ROUTINE PRENATAL (OUTPATIENT)
Dept: PERINATAL CARE | Facility: CLINIC | Age: 25
End: 2020-11-09
Payer: COMMERCIAL

## 2020-11-09 VITALS
TEMPERATURE: 97.2 F | HEART RATE: 80 BPM | SYSTOLIC BLOOD PRESSURE: 126 MMHG | WEIGHT: 220.2 LBS | BODY MASS INDEX: 36.69 KG/M2 | DIASTOLIC BLOOD PRESSURE: 73 MMHG | HEIGHT: 65 IN

## 2020-11-09 DIAGNOSIS — Z3A.21 21 WEEKS GESTATION OF PREGNANCY: ICD-10-CM

## 2020-11-09 DIAGNOSIS — Z36.3 ENCOUNTER FOR ANTENATAL SCREENING FOR MALFORMATIONS: Primary | ICD-10-CM

## 2020-11-09 DIAGNOSIS — O99.212 OBESITY AFFECTING PREGNANCY IN SECOND TRIMESTER: ICD-10-CM

## 2020-11-09 DIAGNOSIS — Z36.86 ENCOUNTER FOR ANTENATAL SCREENING FOR CERVICAL LENGTH: ICD-10-CM

## 2020-11-09 PROCEDURE — 99242 OFF/OP CONSLTJ NEW/EST SF 20: CPT | Performed by: OBSTETRICS & GYNECOLOGY

## 2020-11-09 PROCEDURE — 76805 OB US >/= 14 WKS SNGL FETUS: CPT | Performed by: OBSTETRICS & GYNECOLOGY

## 2020-11-09 PROCEDURE — 76817 TRANSVAGINAL US OBSTETRIC: CPT | Performed by: OBSTETRICS & GYNECOLOGY

## 2020-11-17 ENCOUNTER — ROUTINE PRENATAL (OUTPATIENT)
Dept: OBGYN CLINIC | Facility: MEDICAL CENTER | Age: 25
End: 2020-11-17

## 2020-11-17 VITALS — SYSTOLIC BLOOD PRESSURE: 135 MMHG | BODY MASS INDEX: 37.57 KG/M2 | DIASTOLIC BLOOD PRESSURE: 75 MMHG | WEIGHT: 222.3 LBS

## 2020-11-17 DIAGNOSIS — O99.280 THYROID DYSFUNCTION IN PREGNANCY: ICD-10-CM

## 2020-11-17 DIAGNOSIS — E07.9 THYROID DYSFUNCTION IN PREGNANCY: ICD-10-CM

## 2020-11-17 DIAGNOSIS — R03.0 ELEVATED BLOOD PRESSURE READING: ICD-10-CM

## 2020-11-17 DIAGNOSIS — Z34.92 SECOND TRIMESTER PREGNANCY: Primary | ICD-10-CM

## 2020-11-17 PROCEDURE — PNV: Performed by: OBSTETRICS & GYNECOLOGY

## 2020-11-23 PROBLEM — Z3A.23 23 WEEKS GESTATION OF PREGNANCY: Status: ACTIVE | Noted: 2020-10-15

## 2020-11-23 LAB
ALBUMIN SERPL-MCNC: 3.4 G/DL (ref 3.6–5.1)
ALBUMIN/GLOB SERPL: 1.1 (CALC) (ref 1–2.5)
ALP SERPL-CCNC: 49 U/L (ref 31–125)
ALT SERPL-CCNC: 11 U/L (ref 6–29)
AST SERPL-CCNC: 14 U/L (ref 10–30)
BASOPHILS # BLD AUTO: 45 CELLS/UL (ref 0–200)
BASOPHILS NFR BLD AUTO: 0.4 %
BILIRUB SERPL-MCNC: 0.3 MG/DL (ref 0.2–1.2)
BUN SERPL-MCNC: 8 MG/DL (ref 7–25)
BUN/CREAT SERPL: ABNORMAL (CALC) (ref 6–22)
CALCIUM SERPL-MCNC: 9.2 MG/DL (ref 8.6–10.2)
CHLORIDE SERPL-SCNC: 102 MMOL/L (ref 98–110)
CO2 SERPL-SCNC: 26 MMOL/L (ref 20–32)
CREAT SERPL-MCNC: 0.62 MG/DL (ref 0.5–1.1)
CREAT UR-MCNC: 72 MG/DL (ref 20–275)
EOSINOPHIL # BLD AUTO: 68 CELLS/UL (ref 15–500)
EOSINOPHIL NFR BLD AUTO: 0.6 %
ERYTHROCYTE [DISTWIDTH] IN BLOOD BY AUTOMATED COUNT: 11.9 % (ref 11–15)
GLOBULIN SER CALC-MCNC: 3.2 G/DL (CALC) (ref 1.9–3.7)
GLUCOSE SERPL-MCNC: 83 MG/DL (ref 65–99)
HCT VFR BLD AUTO: 35.1 % (ref 35–45)
HGB BLD-MCNC: 11.9 G/DL (ref 11.7–15.5)
LYMPHOCYTES # BLD AUTO: 983 CELLS/UL (ref 850–3900)
LYMPHOCYTES NFR BLD AUTO: 8.7 %
MCH RBC QN AUTO: 31.3 PG (ref 27–33)
MCHC RBC AUTO-ENTMCNC: 33.9 G/DL (ref 32–36)
MCV RBC AUTO: 92.4 FL (ref 80–100)
MONOCYTES # BLD AUTO: 768 CELLS/UL (ref 200–950)
MONOCYTES NFR BLD AUTO: 6.8 %
NEUTROPHILS # BLD AUTO: 9436 CELLS/UL (ref 1500–7800)
NEUTROPHILS NFR BLD AUTO: 83.5 %
PLATELET # BLD AUTO: 319 THOUSAND/UL (ref 140–400)
PMV BLD REES-ECKER: 10 FL (ref 7.5–12.5)
POTASSIUM SERPL-SCNC: 4.2 MMOL/L (ref 3.5–5.3)
PROT SERPL-MCNC: 6.6 G/DL (ref 6.1–8.1)
PROT UR-MCNC: 9 MG/DL (ref 5–24)
PROT/CREAT UR: 0.12 MG/MG CREAT (ref 0.02–0.16)
PROT/CREAT UR: 125 MG/G CREAT (ref 21–161)
RBC # BLD AUTO: 3.8 MILLION/UL (ref 3.8–5.1)
SL AMB EGFR AFRICAN AMERICAN: 145 ML/MIN/1.73M2
SL AMB EGFR NON AFRICAN AMERICAN: 125 ML/MIN/1.73M2
SODIUM SERPL-SCNC: 137 MMOL/L (ref 135–146)
TSH SERPL-ACNC: 2.06 MIU/L
WBC # BLD AUTO: 11.3 THOUSAND/UL (ref 3.8–10.8)

## 2020-11-25 ENCOUNTER — ROUTINE PRENATAL (OUTPATIENT)
Dept: OBGYN CLINIC | Facility: MEDICAL CENTER | Age: 25
End: 2020-11-25

## 2020-11-25 VITALS — BODY MASS INDEX: 37.96 KG/M2 | SYSTOLIC BLOOD PRESSURE: 140 MMHG | WEIGHT: 224.6 LBS | DIASTOLIC BLOOD PRESSURE: 88 MMHG

## 2020-11-25 DIAGNOSIS — Z3A.23 23 WEEKS GESTATION OF PREGNANCY: Primary | ICD-10-CM

## 2020-11-25 DIAGNOSIS — O99.212 OBESITY AFFECTING PREGNANCY IN SECOND TRIMESTER: ICD-10-CM

## 2020-11-25 DIAGNOSIS — E03.9 HYPOTHYROIDISM, UNSPECIFIED TYPE: ICD-10-CM

## 2020-11-25 DIAGNOSIS — O10.919 CHRONIC HYPERTENSION AFFECTING PREGNANCY: ICD-10-CM

## 2020-11-25 PROCEDURE — PNV: Performed by: STUDENT IN AN ORGANIZED HEALTH CARE EDUCATION/TRAINING PROGRAM

## 2020-12-07 ENCOUNTER — TELEPHONE (OUTPATIENT)
Dept: PERINATAL CARE | Facility: CLINIC | Age: 25
End: 2020-12-07

## 2020-12-08 ENCOUNTER — ULTRASOUND (OUTPATIENT)
Dept: PERINATAL CARE | Facility: OTHER | Age: 25
End: 2020-12-08
Payer: COMMERCIAL

## 2020-12-08 VITALS
SYSTOLIC BLOOD PRESSURE: 122 MMHG | DIASTOLIC BLOOD PRESSURE: 76 MMHG | HEIGHT: 64 IN | HEART RATE: 101 BPM | WEIGHT: 229.6 LBS | BODY MASS INDEX: 39.2 KG/M2

## 2020-12-08 DIAGNOSIS — Z36.89 ENCOUNTER FOR ULTRASOUND TO CHECK FETAL GROWTH: ICD-10-CM

## 2020-12-08 DIAGNOSIS — O10.919 CHRONIC HYPERTENSION AFFECTING PREGNANCY: Primary | ICD-10-CM

## 2020-12-08 DIAGNOSIS — IMO0002 EVALUATE ANATOMY NOT SEEN ON PRIOR SONOGRAM: ICD-10-CM

## 2020-12-08 PROCEDURE — 99212 OFFICE O/P EST SF 10 MIN: CPT | Performed by: OBSTETRICS & GYNECOLOGY

## 2020-12-08 PROCEDURE — 76816 OB US FOLLOW-UP PER FETUS: CPT | Performed by: OBSTETRICS & GYNECOLOGY

## 2020-12-11 ENCOUNTER — ROUTINE PRENATAL (OUTPATIENT)
Dept: OBGYN CLINIC | Facility: MEDICAL CENTER | Age: 25
End: 2020-12-11

## 2020-12-11 VITALS — DIASTOLIC BLOOD PRESSURE: 70 MMHG | BODY MASS INDEX: 39.22 KG/M2 | WEIGHT: 228.5 LBS | SYSTOLIC BLOOD PRESSURE: 130 MMHG

## 2020-12-11 DIAGNOSIS — O10.919 CHRONIC HYPERTENSION AFFECTING PREGNANCY: Primary | ICD-10-CM

## 2020-12-11 DIAGNOSIS — Z3A.25 25 WEEKS GESTATION OF PREGNANCY: ICD-10-CM

## 2020-12-11 DIAGNOSIS — Z34.82 ENCOUNTER FOR SUPERVISION OF OTHER NORMAL PREGNANCY IN SECOND TRIMESTER: ICD-10-CM

## 2020-12-11 PROCEDURE — PNV: Performed by: OBSTETRICS & GYNECOLOGY

## 2020-12-11 RX ORDER — ASPIRIN 81 MG/1
81 TABLET ORAL DAILY
COMMUNITY
End: 2021-03-12 | Stop reason: HOSPADM

## 2021-01-07 ENCOUNTER — ROUTINE PRENATAL (OUTPATIENT)
Dept: OBGYN CLINIC | Facility: MEDICAL CENTER | Age: 26
End: 2021-01-07
Payer: COMMERCIAL

## 2021-01-07 VITALS — SYSTOLIC BLOOD PRESSURE: 135 MMHG | DIASTOLIC BLOOD PRESSURE: 75 MMHG | BODY MASS INDEX: 39.86 KG/M2 | WEIGHT: 232.2 LBS

## 2021-01-07 DIAGNOSIS — Z3A.25 25 WEEKS GESTATION OF PREGNANCY: ICD-10-CM

## 2021-01-07 DIAGNOSIS — O10.919 CHRONIC HYPERTENSION AFFECTING PREGNANCY: Primary | ICD-10-CM

## 2021-01-07 DIAGNOSIS — Z3A.29 29 WEEKS GESTATION OF PREGNANCY: ICD-10-CM

## 2021-01-07 PROCEDURE — 90715 TDAP VACCINE 7 YRS/> IM: CPT | Performed by: OBSTETRICS & GYNECOLOGY

## 2021-01-07 PROCEDURE — PNV: Performed by: OBSTETRICS & GYNECOLOGY

## 2021-01-07 PROCEDURE — 90471 IMMUNIZATION ADMIN: CPT | Performed by: OBSTETRICS & GYNECOLOGY

## 2021-01-07 NOTE — PROGRESS NOTES
Mark Tomlinson is a 22y o  year old  at 32w2d for routine prenatal visit  GTT and CBC done today  RhoGam needed No  Tdap needed Yes Given: Yes  FKC reviewed  28 week booklet and birth plan given today     Consent for delivery signed   CHTN - on ASA , doing well , states BP at home always good , we discussed signs and symptoms of preeclampsia that warrant immediate attention  Has appointment sheduled next week at [facility]

## 2021-01-08 ENCOUNTER — TELEPHONE (OUTPATIENT)
Dept: OBGYN CLINIC | Facility: MEDICAL CENTER | Age: 26
End: 2021-01-08

## 2021-01-08 DIAGNOSIS — R73.09 ABNORMAL GLUCOSE: Primary | ICD-10-CM

## 2021-01-08 LAB
BASOPHILS # BLD AUTO: 30 CELLS/UL (ref 0–200)
BASOPHILS NFR BLD AUTO: 0.3 %
EOSINOPHIL # BLD AUTO: 60 CELLS/UL (ref 15–500)
EOSINOPHIL NFR BLD AUTO: 0.6 %
ERYTHROCYTE [DISTWIDTH] IN BLOOD BY AUTOMATED COUNT: 12.4 % (ref 11–15)
GLUCOSE 1H P 50 G GLC PO SERPL-MCNC: 153 MG/DL
HCT VFR BLD AUTO: 35.4 % (ref 35–45)
HGB BLD-MCNC: 11.7 G/DL (ref 11.7–15.5)
LYMPHOCYTES # BLD AUTO: 880 CELLS/UL (ref 850–3900)
LYMPHOCYTES NFR BLD AUTO: 8.8 %
MCH RBC QN AUTO: 30.2 PG (ref 27–33)
MCHC RBC AUTO-ENTMCNC: 33.1 G/DL (ref 32–36)
MCV RBC AUTO: 91.5 FL (ref 80–100)
MONOCYTES # BLD AUTO: 600 CELLS/UL (ref 200–950)
MONOCYTES NFR BLD AUTO: 6 %
NEUTROPHILS # BLD AUTO: 8430 CELLS/UL (ref 1500–7800)
NEUTROPHILS NFR BLD AUTO: 84.3 %
PLATELET # BLD AUTO: 328 THOUSAND/UL (ref 140–400)
PMV BLD REES-ECKER: 10.3 FL (ref 7.5–12.5)
RBC # BLD AUTO: 3.87 MILLION/UL (ref 3.8–5.1)
WBC # BLD AUTO: 10 THOUSAND/UL (ref 3.8–10.8)

## 2021-01-09 ENCOUNTER — LAB (OUTPATIENT)
Dept: LAB | Facility: HOSPITAL | Age: 26
End: 2021-01-09
Attending: OBSTETRICS & GYNECOLOGY
Payer: COMMERCIAL

## 2021-01-09 DIAGNOSIS — R73.09 ABNORMAL GLUCOSE: ICD-10-CM

## 2021-01-09 LAB — GLUCOSE P FAST SERPL-MCNC: 108 MG/DL (ref 65–99)

## 2021-01-09 PROCEDURE — 36415 COLL VENOUS BLD VENIPUNCTURE: CPT

## 2021-01-09 PROCEDURE — 82951 GLUCOSE TOLERANCE TEST (GTT): CPT

## 2021-01-11 ENCOUNTER — TELEPHONE (OUTPATIENT)
Dept: PERINATAL CARE | Facility: CLINIC | Age: 26
End: 2021-01-11

## 2021-01-11 DIAGNOSIS — O24.410 DIET CONTROLLED GESTATIONAL DIABETES MELLITUS (GDM) IN THIRD TRIMESTER: Primary | ICD-10-CM

## 2021-01-11 PROBLEM — O99.213 OBESITY AFFECTING PREGNANCY IN THIRD TRIMESTER: Status: ACTIVE | Noted: 2020-11-09

## 2021-01-11 PROBLEM — O24.419 GESTATIONAL DIABETES MELLITUS (GDM) IN THIRD TRIMESTER: Status: ACTIVE | Noted: 2021-01-11

## 2021-01-11 NOTE — TELEPHONE ENCOUNTER
Attempted to reach patient by phone and left voicemail to confirm appointment for MFM ultrasound  1 support person ( must be over the age of 15) may accompany you for your appointment  If you or your support person have traveled outside the state in the past 2 weeks, please call and notify our office today #[phone]  You and your support person must wear a mask ,covering nose and mouth,during your entire visit  To minimize your exposure in our waiting room, please call our office prior to entering the building  Check in and rooming questions will be done via phone  We will give you directions when to enter for your appointment  [place]: [phone]    IF you are not feeling well- cough, fever, shortness of breath or any flu like symptoms, contact your primary care physician or [phone]  If you are awaiting COVID 19 test results please call and reschedule your appointment    Any questions with these instructions please call Maternal Fetal Medicine nurse line today @ # [phone]

## 2021-01-11 NOTE — PATIENT INSTRUCTIONS
Thank you for choosing us for your  care today  If you have any questions about your ultrasound or care, please do not hesitate to contact us or your primary obstetrician  Some general instructions for your pregnancy are:     Protect against coronavirus: Pregnant women are increased risk of severe COVID  Continue to practice social distancing, wear a mask, and wash your hands often  Because of the increased risk of pregnancy, you are advised to not attend or host inperson gatherings with people who do not currently live inside your household - this includes birthday parties, gender reveals, baby showers, etc)  Notify your primary care doctor if you have any symptoms including cough, shortness of breath or difficulty breathing, fever, chills, muscle pain, sore throat, or loss of taste or smell  Pregnant women can receive the coronavirus vaccine   Exercise: Aim for 22 minutes per day (150 minutes per week) of regular exercise  Walking is great!  Nutrition: aim for calcium-rich and iron-rich foods as well as healthy sources of protein   Protect against the flu: get yourself and your entire household vaccinated against influenza  This will protect your baby   Learn about Preeclampsia: preeclampsia is a common, serious high blood pressure complication in pregnancy  A blood pressure of 193IKVP (systolic or top number) or 06ERNJ (diastolic or bottom number) is not normal and needs evaluation by your doctor   If you smoke, try to reduce how many cigarettes you smoke or quit completely  Do not vape   Other warning signs to watch out for in pregnancy or postpartum: chest pain, obstructed breathing or shortness of breath, seizures, thoughts of hurting yourself or your baby, bleeding, a painful or swollen leg, fever, or headache (see [facility] POST-BIRTH Warning Signs campaign)  If these happen call 911    Itching is also not normal in pregnancy and if you experience this, especially over your hands and feet, potentially worse at night, notify your doctors   Lastly, if you are contacted regarding participation in a survey about your experience in our office, please know that we take any feedback you provide seriously and use it to improve how we deliver care through our center

## 2021-01-12 ENCOUNTER — ULTRASOUND (OUTPATIENT)
Dept: PERINATAL CARE | Facility: OTHER | Age: 26
End: 2021-01-12
Payer: COMMERCIAL

## 2021-01-12 VITALS
HEIGHT: 64 IN | HEART RATE: 103 BPM | DIASTOLIC BLOOD PRESSURE: 92 MMHG | SYSTOLIC BLOOD PRESSURE: 151 MMHG | BODY MASS INDEX: 39.67 KG/M2 | WEIGHT: 232.4 LBS

## 2021-01-12 DIAGNOSIS — O24.419 GESTATIONAL DIABETES MELLITUS (GDM) IN THIRD TRIMESTER, GESTATIONAL DIABETES METHOD OF CONTROL UNSPECIFIED: ICD-10-CM

## 2021-01-12 DIAGNOSIS — Z36.89 ENCOUNTER FOR ULTRASOUND TO CHECK FETAL GROWTH: Primary | ICD-10-CM

## 2021-01-12 DIAGNOSIS — O10.919 CHRONIC HYPERTENSION AFFECTING PREGNANCY: ICD-10-CM

## 2021-01-12 DIAGNOSIS — O99.213 OBESITY AFFECTING PREGNANCY IN THIRD TRIMESTER: ICD-10-CM

## 2021-01-12 PROCEDURE — 76816 OB US FOLLOW-UP PER FETUS: CPT | Performed by: OBSTETRICS & GYNECOLOGY

## 2021-01-12 PROCEDURE — 99213 OFFICE O/P EST LOW 20 MIN: CPT | Performed by: OBSTETRICS & GYNECOLOGY

## 2021-01-12 NOTE — PROGRESS NOTES
[address]: Ms Renny Bishop was seen today at 30w2d for fetal growth and followup missed anatomy ultrasound  See ultrasound report under "OB Procedures" tab  Please don't hesitate to contact our office with any concerns or questions    Mik Curran MD

## 2021-01-15 ENCOUNTER — TELEPHONE (OUTPATIENT)
Dept: OBGYN CLINIC | Facility: MEDICAL CENTER | Age: 26
End: 2021-01-15

## 2021-01-15 NOTE — TELEPHONE ENCOUNTER
Pt called in and stated that she was supposed to be referred to diabetic education  She was told if she did not hear from them by Friday to let us know  Please check referral and review with pt

## 2021-01-21 ENCOUNTER — TELEPHONE (OUTPATIENT)
Dept: PERINATAL CARE | Facility: CLINIC | Age: 26
End: 2021-01-21

## 2021-01-21 ENCOUNTER — ROUTINE PRENATAL (OUTPATIENT)
Dept: OBGYN CLINIC | Facility: MEDICAL CENTER | Age: 26
End: 2021-01-21

## 2021-01-21 VITALS — WEIGHT: 232 LBS | DIASTOLIC BLOOD PRESSURE: 70 MMHG | SYSTOLIC BLOOD PRESSURE: 126 MMHG | BODY MASS INDEX: 39.82 KG/M2

## 2021-01-21 DIAGNOSIS — O24.419 GESTATIONAL DIABETES MELLITUS (GDM), ANTEPARTUM, GESTATIONAL DIABETES METHOD OF CONTROL UNSPECIFIED: Primary | ICD-10-CM

## 2021-01-21 DIAGNOSIS — Z3A.31 31 WEEKS GESTATION OF PREGNANCY: Primary | ICD-10-CM

## 2021-01-21 DIAGNOSIS — O99.213 OBESITY AFFECTING PREGNANCY IN THIRD TRIMESTER: ICD-10-CM

## 2021-01-21 PROBLEM — Z3A.25 25 WEEKS GESTATION OF PREGNANCY: Status: RESOLVED | Noted: 2020-10-15 | Resolved: 2021-01-21

## 2021-01-21 PROCEDURE — PNV: Performed by: NURSE PRACTITIONER

## 2021-01-21 RX ORDER — BLOOD-GLUCOSE METER
EACH MISCELLANEOUS
Qty: 1 KIT | Refills: 0 | Status: SHIPPED | OUTPATIENT
Start: 2021-01-21 | End: 2021-06-30 | Stop reason: ALTCHOICE

## 2021-01-21 RX ORDER — BLOOD SUGAR DIAGNOSTIC
STRIP MISCELLANEOUS
Qty: 100 EACH | Refills: 3 | Status: SHIPPED | OUTPATIENT
Start: 2021-01-21 | End: 2021-03-12 | Stop reason: HOSPADM

## 2021-01-21 RX ORDER — BLOOD-GLUCOSE METER
EACH MISCELLANEOUS 4 TIMES DAILY
Qty: 1 KIT | Refills: 0 | Status: SHIPPED | OUTPATIENT
Start: 2021-01-21 | End: 2021-06-30 | Stop reason: ALTCHOICE

## 2021-01-21 RX ORDER — LANCETS 33 GAUGE
EACH MISCELLANEOUS
Qty: 100 EACH | Refills: 3 | Status: SHIPPED | OUTPATIENT
Start: 2021-01-21 | End: 2021-06-30 | Stop reason: ALTCHOICE

## 2021-01-21 RX ORDER — LANCETS
EACH MISCELLANEOUS
Qty: 100 EACH | Refills: 3 | Status: SHIPPED | OUTPATIENT
Start: 2021-01-21 | End: 2021-06-30 | Stop reason: ALTCHOICE

## 2021-01-21 NOTE — PROGRESS NOTES
Stefanie Rodriguez is a 22y o  year old  at 31w4d for routine prenatal visit    + FM, no vaginal bleeding, contractions, or LOF  Complaints: No   Most recent ultrasound and labs reviewed  Has virtual appt with diabetic ed soon  Had Tdap,returned BP, Has breast pump  Checking bp at home and it is always normal   next mfm scan     fkc and ptl precautions reviewed

## 2021-01-25 ENCOUNTER — TELEPHONE (OUTPATIENT)
Dept: PERINATAL CARE | Facility: CLINIC | Age: 26
End: 2021-01-25

## 2021-01-25 ENCOUNTER — TELEMEDICINE (OUTPATIENT)
Dept: PERINATAL CARE | Facility: CLINIC | Age: 26
End: 2021-01-25
Payer: COMMERCIAL

## 2021-01-25 DIAGNOSIS — Z3A.32 32 WEEKS GESTATION OF PREGNANCY: ICD-10-CM

## 2021-01-25 DIAGNOSIS — O24.410 DIET CONTROLLED GESTATIONAL DIABETES MELLITUS (GDM) IN THIRD TRIMESTER: Primary | ICD-10-CM

## 2021-01-25 PROCEDURE — G0109 DIAB MANAGE TRN IND/GROUP: HCPCS | Performed by: DIETITIAN, REGISTERED

## 2021-01-25 NOTE — PROGRESS NOTES
Thank you for referring your patient to [facility] Maternal Fetal Medicine Diabetes in Pregnancy Program      Gerald Mcdonnell is a  22 y o  female who presents today for Initial visit (class 1)  Patient is at Unknown gestation, Estimated Date of Delivery: None noted        Reviewed and updated the following from patients medical record: PMH, Problem List, Allergies, and Current Medications  Visit Diagnosis:  GDM in pregnancy method of control unspecified    Discussed with patient what GDM is, pathophysiology of GDM, untreated GDM and maternal fetal complications including fetal macrosomia,  hypoglycemia, polyhydramnios, increased incidence of  section,  labor, and in severe cases fetal demise and still birth   Discussed importance of blood glucose monitoring, nutrition, and medication if necessary in achieving BG goals  Discussed resumption of non-diabetic state post delivery, but reviewed increased risk of Type 2 DM later in life and ways to reduce risk by maintaining a healthy weight and eating habits      PMH/PSH:  Past Medical History:   Diagnosis Date    Chronic hypertension affecting pregnancy 2020    Disease of thyroid gland     Gestational diabetes mellitus (GDM) in third trimester 2021    Hypothyroid     Seasonal allergies     Urinary tract infection     Varicella      Past Surgical History:   Procedure Laterality Date    KNEE CARTILAGE SURGERY Right     WISDOM TOOTH EXTRACTION      WRIST SURGERY Left        Labs:  No components found for: [employer] [place]  Lab Results   Component Value Date     2017     Lab Results   Component Value Date    [id number] 153 (H) 2021       (21) 3-Hr GTT:   Fastin        Medications:  Current Outpatient Medications on File Prior to Visit   Medication Sig Dispense Refill    Accu-Chek Softclix Lancets lancets Use 4 a day or as instructed 100 each 3    aspirin (ECOTRIN LOW STRENGTH) 81 mg EC tablet Take 81 mg by mouth daily      Blood Glucose Monitoring Suppl (Accu-Chek Guide) w/Device KIT Use 4 (four) times a day 1 kit 0    Blood Glucose Monitoring Suppl (OneTouch Verio Flex System) w/Device KIT Test 4 times per day fasting and 2 hours after the start of each meal 1 kit 0    glucose blood (Accu-Chek Guide) test strip Test 4 times per day fasting and 2 hours after the start of each meal 100 each 3    levothyroxine 88 mcg tablet Take 88 mcg by mouth daily      OneTouch Delica Lancets 87M MISC Test 4 times per day fasting and 2 hours after the start of each meal 100 each 3    OneTouch Verio test strip Test 4 times per day fasting and 2 hours after the start of each meal 100 each 3    Prenatal Vit-Fe Fumarate-FA (PRENATAL COMPLETE PO) Take 1 tablet by mouth daily  No current facility-administered medications on file prior to visit  Recent Ultrasound Report:   DATE:1/12   Fetal Growth: Normal  NINA: Normal    Anthropometrics:  Ht Readings from Last 3 Encounters:   01/12/21 5' 4" (1 626 m)   12/08/20 5' 4" (1 626 m)   11/09/20 5' 4 5" (1 638 m)     Wt Readings from Last 3 Encounters:   01/21/21 105 kg (232 lb)   01/12/21 105 kg (232 lb 6 4 oz)   01/07/21 105 kg (232 lb 3 2 oz)     Pre-gravid weight: 232 lbs  Pre-gravid BMI: 39  Weight Change: 0 lbs  Weight gain recommendations: BMI (> 30) 11-20 lbs    Blood Glucose Monitoring:   Glucose Meter: OneTouch Verio Flex  Instructed on testing blood sugars: 4 x per day (Fasting, 2 hour after start of each meal)  Blood sugar reading during demo:patient testing prior to office visit    Gave instruction on site selection, skin preparation, loading strips and lancet device, meter activation, obtaining blood sample, test strip and lancet disposal and storage, and recording log book entries  Patient has good understanding of material covered and was able to test their own blood sugar in office today       Instruction for reporting blood sugar results weekly via:   Phone: [phone]   Fax: [phone]   My Chart (Message, or Glucose Flowsheet)    Goal Blood Sugar Ranges:    Fastin-90 mg/dL   1 hour after the start of each meal: 135 mg/dL or <   2 hours after start of each meal: 120 mg/dL or <      Meal Plan (daily calorie and protein needs):  Calories: 2200 calorie (CHO:12-47-65-30-60-30) (PRO: 3-2-3/4-2-3/4-2)  Protein:115 gm/day    Diet Instruction:  1  Patient was provided with a meal plan including 3 meals and 3 snacks  2  Discussed appropriate amounts of CHO, PRO, and Fat at each meal and snack  3  Reviewed CHO exchange list, and portion sizes for both CHO and PRO via food models  4  Instruction on how to read a food label  5  Provided suggested meal/snack options to increase nutrition and maintain consistent meal and snack intakes  6  Instructed on how to keep a 3-day food diary to be brought to follow- up appointment  7  Encouraged  patient to eat every 2 0-3 5 hours while awake  8  Encouraged patient to go no longer than 8-10 hours fasting overnight until first meal of the day  Diet Assessment:  Note: patient stated she was eating 2 meals and maybe 1-2 snacks daily, but changed to 3 meals and 3 snacks since diagnosed and feels much better    Physical Activity:  Discussed benefits of physical activity to optimize blood glucose control, encouraged activity at patient is physically able  Always consult a physician prior to starting an exercise program  Recommend 20-30 minutes daily  Do you exercise? unknown    Nutrition Diagnosis Statement:  Nutrition Diagnosis: Altered nutrition related lab values (glucose)  Related to: GDM  As evidenced by: 1 hr  mg/dL, fasting glucose 108 mg/dL    Goals:  1  Fasting BG 60-90 mg/dL  2  2 hr PP Blood Glucose <120 mg/dL (1 hr PP <135)  3  GDM Diet     Monitoring and Evaluation  1  Adherence to GDM diet  2  Blood Glucose Monitoring   3   Weight/ Body Composition      Patient Stated Goal: "I will eat 3 meals and 3 snacks each day, including protein at each"    Diabetes Self Management Support Plan outside of ongoing care: Spouse/Family    Learner/s Present:Learners Present: Patient   Educational Materials Provided: Diabetes in Pregnancy Booklet, Meal Plan 2200 calorie, 3-day Food Log and Self Assessment Form  Teaching methods used: Teaching Methods MFM: Listened to Instruction, Visualized Demonstration and Meter Teaching/Demo  Patients Response to Education Information Provided: Voices Understanding  Readiness to Change: Action (Actively implementing change)  Barriers to Learning/Change: no barriers  Expected Compliance: good    Date to report blood sugars: Sunday, 1/31 prior to class 2  Class 2 (date): Monday, 2/1    Begin Time: 1:00 pm  End Time: 2:30 pm    It was a pleasure working with them today  Please feel free to call with any questions or concerns      Srinath Snyder RD, ALOKN  Diabetes Educator  [facility] Maternal Fetal Medicine  Diabetes in Pregnancy Program  [address] [place] [address] 54, [address] [place] [address]  Virtual Regular Visit      Assessment/Plan:    Problem List Items Addressed This Visit        Endocrine    Gestational diabetes mellitus (GDM) in third trimester - Primary      Other Visit Diagnoses     32 weeks gestation of pregnancy                   Reason for visit is   Chief Complaint   Patient presents with    Virtual Regular Visit        Encounter provider Srinath Snyder    Provider located at [address] [place] [id number] [phone]      Recent Visits  Date Type Provider Dept   01/21/21 Telephone Prerna Angulo, [address] [place] [address] Drive recent visits within past 7 days and meeting all other requirements     Today's Visits  Date Type Provider Dept   01/25/21 Aron Nathan   Showing today's visits and meeting all other requirements     Future Appointments  No visits were found meeting these conditions  Showing future appointments within next 150 days and meeting all other requirements        The patient was identified by name and date of birth  Reed Ambrosio was informed that this is a telemedicine visit and that the visit is being conducted through [employer] and patient was informed that this is a secure, HIPAA-compliant platform  She agrees to proceed     My office door was closed  No one else was in the room  She acknowledged consent and understanding of privacy and security of the video platform  The patient has agreed to participate and understands they can discontinue the visit at any time  Patient is aware this is a billable service  Subjective  Reed Ambrosio is a 22 y o  female          HPI     Past Medical History:   Diagnosis Date    Chronic hypertension affecting pregnancy 11/25/2020    Disease of thyroid gland     Gestational diabetes mellitus (GDM) in third trimester 1/11/2021    Hypothyroid     Seasonal allergies     Urinary tract infection     Varicella        Past Surgical History:   Procedure Laterality Date    KNEE CARTILAGE SURGERY Right     WISDOM TOOTH EXTRACTION      WRIST SURGERY Left        Current Outpatient Medications   Medication Sig Dispense Refill    Accu-Chek Softclix Lancets lancets Use 4 a day or as instructed 100 each 3    aspirin (ECOTRIN LOW STRENGTH) 81 mg EC tablet Take 81 mg by mouth daily      Blood Glucose Monitoring Suppl (Accu-Chek Guide) w/Device KIT Use 4 (four) times a day 1 kit 0    Blood Glucose Monitoring Suppl (OneTouch Verio Flex System) w/Device KIT Test 4 times per day fasting and 2 hours after the start of each meal 1 kit 0    glucose blood (Accu-Chek Guide) test strip Test 4 times per day fasting and 2 hours after the start of each meal 100 each 3    levothyroxine 88 mcg tablet Take 88 mcg by mouth daily      OneTouch Delica Lancets 22V MISC Test 4 times per day fasting and 2 hours after the start of each meal 100 each 3    OneTouch Verio test strip Test 4 times per day fasting and 2 hours after the start of each meal 100 each 3    Prenatal Vit-Fe Fumarate-FA (PRENATAL COMPLETE PO) Take 1 tablet by mouth daily  No current facility-administered medications for this visit  Allergies   Allergen Reactions    Pollen Extract        Review of Systems    Video Exam    There were no vitals filed for this visit  Physical Exam     I spent 90 minutes directly with the patient during this visit      VIRTUAL VISIT DISCLAIMER    Priyanka Curt acknowledges that she has consented to an online visit or consultation  She understands that the online visit is based solely on information provided by her, and that, in the absence of a face-to-face physical evaluation by the physician, the diagnosis she receives is both limited and provisional in terms of accuracy and completeness  This is not intended to replace a full medical face-to-face evaluation by the physician  Priyanka Elias understands and accepts these terms

## 2021-01-25 NOTE — PATIENT INSTRUCTIONS
1  Continue Meal Plan consisting of 3 meals and 3 snacks daily, including protein at each  2  Try to eat every 2-3 5 hours while awake  3  Do not exceed 8-10 hours fasting overnight  4  Continue self-monitoring blood glucose: 4 x per day (Fasting, 2 hour after start of each meal)  Goal: fasting glucose 90 mg/dL or less, and 2 hrs after the start of each meal 120 mg/dL or less (1 hr after the start of each meal 135 mg/dL or less)  5  Submit blood sugar values weekly via: Telephone  at [phone] or [employer] messaging or [employer] glucose flowsheet  6  If no restriction from physician, recommend up to 30 minutes walking daily  7  Report blood sugars on Sunday, 1/31 prior to class 2  8  Complete 3 day food log and self assessment form and send in via [employer] message as image attachment to [facility] provider prior to class 2 appointment     9  Class 2 follow up appointment scheduled for Monday, 2/1

## 2021-02-01 ENCOUNTER — DOCUMENTATION (OUTPATIENT)
Dept: PERINATAL CARE | Facility: CLINIC | Age: 26
End: 2021-02-01

## 2021-02-01 DIAGNOSIS — O24.410 DIET CONTROLLED GESTATIONAL DIABETES MELLITUS (GDM) IN THIRD TRIMESTER: Primary | ICD-10-CM

## 2021-02-01 NOTE — PROGRESS NOTES
Date:  21  RE: Lou Clifford    : 1995  AUSTYN: 3/21/21  EGA: 33w1d        Current regimen:  2200 calorie GDM diet with 3 meals and 3 snacks including recommended combination of carb, protein and fat per meal/snack  Self monitoring blood glucose fasting and 2 hours post start of each meal     Plan:  Continue GDM diet, testing and reporting Monday or sooner if needed  Reschedule Class 2 GDM class  Date due to report next: At minimum weekly      Diabetes Self Management Support Plan outside of ongoing care: Spouse/Family    [facility]  Diabetes and Pregnancy Program

## 2021-02-01 NOTE — PROGRESS NOTES
Patient sent in 3 day food log via [employer] as image attachment for review  Per diet recall patient is doing a fair job balancing her CHO and Protein intake throughout the day  She is averaging 4-5 oz protein at her 3 main meals and about 2 oz at her snacks in between  Advised patient to add extra oz of protein at bedtime snack when have [employer]asso frozen yogurt bars, also provided other balanced snack ideas to try at bedtime snack that provide 15 gm CHO with 2-3 oz protein

## 2021-02-03 ENCOUNTER — TELEPHONE (OUTPATIENT)
Dept: PERINATAL CARE | Facility: CLINIC | Age: 26
End: 2021-02-03

## 2021-02-03 NOTE — TELEPHONE ENCOUNTER
LMOM to return call to reschedule her missed appt on 2/1 due to weather and office being closed      Thank you

## 2021-02-04 ENCOUNTER — ROUTINE PRENATAL (OUTPATIENT)
Dept: OBGYN CLINIC | Facility: MEDICAL CENTER | Age: 26
End: 2021-02-04

## 2021-02-04 VITALS
HEIGHT: 64 IN | WEIGHT: 230 LBS | DIASTOLIC BLOOD PRESSURE: 80 MMHG | BODY MASS INDEX: 39.27 KG/M2 | SYSTOLIC BLOOD PRESSURE: 130 MMHG

## 2021-02-04 DIAGNOSIS — Z34.83 PRENATAL CARE, SUBSEQUENT PREGNANCY, THIRD TRIMESTER: Primary | ICD-10-CM

## 2021-02-04 PROCEDURE — PNV: Performed by: OBSTETRICS & GYNECOLOGY

## 2021-02-05 ENCOUNTER — TELEPHONE (OUTPATIENT)
Dept: PERINATAL CARE | Facility: CLINIC | Age: 26
End: 2021-02-05

## 2021-02-05 NOTE — TELEPHONE ENCOUNTER
Attempted to reach patient by phone and left voicemail to confirm appointment for MFM ultrasound  1 support person ( must be over the age of 15) may accompany you for your appointment  If you or your support person have traveled outside the state in the past 2 weeks, please call and notify our office today #[phone]  You and your support person must wear a mask ,covering nose and mouth,during your entire visit  To minimize your exposure in our waiting room, please call our office prior to entering the building  Check in and rooming questions will be done via phone  We will give you directions when to enter for your appointment  [facility]: [phone]    IF you are not feeling well- cough, fever, shortness of breath or any flu like symptoms, contact your primary care physician or [address] [place] [address]  If you are awaiting COVID 19 test results please call and reschedule your appointment    Any questions with these instructions please call Maternal Fetal Medicine nurse line today @ # [phone]

## 2021-02-07 NOTE — PROGRESS NOTES
Constantine Aquino is a 22y o  year old  at 34w0d for routine prenatal visit    + FM, no vaginal bleeding, contractions, or LOF  Complaints: No     GDM - diet controlled   CHTN -     NST weekly starting at 36 weeks     Most recent ultrasound and labs reviewed    Next sonogram - 21

## 2021-02-08 ENCOUNTER — DOCUMENTATION (OUTPATIENT)
Dept: PERINATAL CARE | Facility: CLINIC | Age: 26
End: 2021-02-08

## 2021-02-08 NOTE — PROGRESS NOTES
Date:  21  RE: Valerie Horton    : 1995  Estimated Date of Delivery: 3/21/21  EGA: 34w2d  OB/GYN: [facility]        Via mychart       Current regimen:  2200 calorie gestational diabetes meal plan; 3 meals and 3 snacks recommended including combination of carb,protein and fat  SMBG 4 times per day; fasting and 2 hrs pp with a OneTouch Verio blood glucose meter  Plan:  Majority of blood sugars reported within target range  There were a couple of FBG above 90 mg/dl and one 2 hr pp above 120 mg/dl  Noted 21 US impressions: Fetal growth and NINA normal   Due to inclement weather on  patient will need to be rescheduled for class 2  Provided with [facility]  number [phone] and also sent an in-basket message to JI Mei MA to reschedule as well     Date due to report next:  Monday, 2/15/21    Omari Cee  Diabetes Educator  Diabetes and Pregnancy Program

## 2021-02-09 ENCOUNTER — ULTRASOUND (OUTPATIENT)
Dept: PERINATAL CARE | Facility: OTHER | Age: 26
End: 2021-02-09
Payer: COMMERCIAL

## 2021-02-09 VITALS
BODY MASS INDEX: 39.44 KG/M2 | HEART RATE: 90 BPM | WEIGHT: 231 LBS | DIASTOLIC BLOOD PRESSURE: 84 MMHG | SYSTOLIC BLOOD PRESSURE: 132 MMHG | HEIGHT: 64 IN

## 2021-02-09 DIAGNOSIS — Z36.89 ENCOUNTER FOR ULTRASOUND TO CHECK FETAL GROWTH: ICD-10-CM

## 2021-02-09 DIAGNOSIS — O24.419 GESTATIONAL DIABETES MELLITUS (GDM) IN THIRD TRIMESTER, GESTATIONAL DIABETES METHOD OF CONTROL UNSPECIFIED: Primary | ICD-10-CM

## 2021-02-09 DIAGNOSIS — O10.919 CHRONIC HYPERTENSION AFFECTING PREGNANCY: ICD-10-CM

## 2021-02-09 PROCEDURE — 76816 OB US FOLLOW-UP PER FETUS: CPT | Performed by: OBSTETRICS & GYNECOLOGY

## 2021-02-09 PROCEDURE — 99213 OFFICE O/P EST LOW 20 MIN: CPT | Performed by: OBSTETRICS & GYNECOLOGY

## 2021-02-09 NOTE — PATIENT INSTRUCTIONS
Thank you for choosing us for your  care today  If you have any questions about your ultrasound or care, please do not hesitate to contact us or your primary obstetrician  Some general instructions for your pregnancy are:     Protect against coronavirus: Pregnant women are increased risk of severe COVID  Continue to practice social distancing, wear a mask, and wash your hands often  Because of the increased risk of pregnancy, you are advised to not attend or host inperson gatherings with people who do not currently live inside your household - this includes birthday parties, gender reveals, baby showers, etc)  Notify your primary care doctor if you have any symptoms including cough, shortness of breath or difficulty breathing, fever, chills, muscle pain, sore throat, or loss of taste or smell  Pregnant women can receive the coronavirus vaccine   Exercise: Aim for 22 minutes per day (150 minutes per week) of regular exercise  Walking is great!  Nutrition: aim for calcium-rich and iron-rich foods as well as healthy sources of protein   Protect against the flu: get yourself and your entire household vaccinated against influenza  This will protect your baby   Learn about Preeclampsia: preeclampsia is a common, serious high blood pressure complication in pregnancy  A blood pressure of 538CQAA (systolic or top number) or 32DKLV (diastolic or bottom number) is not normal and needs evaluation by your doctor   If you smoke, try to reduce how many cigarettes you smoke or quit completely  Do not vape   Other warning signs to watch out for in pregnancy or postpartum: chest pain, obstructed breathing or shortness of breath, seizures, thoughts of hurting yourself or your baby, bleeding, a painful or swollen leg, fever, or headache (see [facility] POST-BIRTH Warning Signs campaign)  If these happen call 911    Itching is also not normal in pregnancy and if you experience this, especially over your hands and feet, potentially worse at night, notify your doctors   Lastly, if you are contacted regarding participation in a survey about your experience in our office, please know that we take any feedback you provide seriously and use it to improve how we deliver care through our center

## 2021-02-09 NOTE — PROGRESS NOTES
[address]: Ms Yevgeniy Walton was seen today at 34w2d for fetal growth assessment ultrasound  See ultrasound report under "OB Procedures" tab  Please don't hesitate to contact our office with any concerns or questions    Rachel Gillette MD

## 2021-02-15 ENCOUNTER — DOCUMENTATION (OUTPATIENT)
Dept: PERINATAL CARE | Facility: CLINIC | Age: 26
End: 2021-02-15

## 2021-02-15 DIAGNOSIS — O99.213 OBESITY AFFECTING PREGNANCY IN THIRD TRIMESTER: ICD-10-CM

## 2021-02-15 DIAGNOSIS — O24.410 DIET CONTROLLED GESTATIONAL DIABETES MELLITUS (GDM) IN THIRD TRIMESTER: Primary | ICD-10-CM

## 2021-02-15 NOTE — PROGRESS NOTES
Date:  02/15/21  RE: Felicitas Jean    : 1995  Estimated Date of Delivery: 3/21/21  EGA: 35w1d  OB/GYN: [facility]        Via mychart       Current regimen:  2200 calorie gestational diabetes meal plan; 3 meals and 3 snacks recommended including combination of carb,protein and fat  SMBG 4 times per day; fasting and 2 hrs pp with a OneTouch Verio blood glucose meter  Plan:  Majority of blood sugars reported within target range  There were a couple of FBG above 90 mg/dl and one 2 hr pp above 120 mg/dl  Noted 21 US impressions: Fetal growth and NINA normal   Due to inclement weather on  patient will need to be rescheduled for class 2  Provided with [facility]  number [phone] and also sent an in-basket message to JI Mei MA to reschedule as well     Date due to report next:  Monday, 2/15/21      Diabetes Educator  Diabetes and Pregnancy Program

## 2021-02-16 NOTE — PROGRESS NOTES
Date:  21  RE: Anita Wetzel    : 1995  Estimated Date of Delivery: 3/21/21  EGA: 35w2d  OB/GYN: [facility]        Via mychart       Current regimen:  2200 calorie gestational diabetes meal plan; 3 meals and 3 snacks recommended including combination of carb, protein and fat  SMBG 4 times per day; fasting and 2 hours post meals with a OneTouch Verio blood glucose meter  Plan:  Please be sure that you are always having a bedtime snack with 15 grams of carbohydrates and 2 to 3 servings of protein  No more than 8 to 10 hours of fasting from bedtime snack to breakfast meal  Continue eating every 2 to 3 5 hours during the day with combination of carbohydrates, protein and fat per meal/snack  Continue testing and report on Monday, 21  Please call [phone] to schedule Class 2 GDM education class  Insulin requirements increase week by week and insulin resistance also increases  Glucose goals fasting 60 to 90 and 2 hours post start of each meal 120 or less  Very important to keep glucose goals within recommendations to decrease risk factors  Date due to report next:  Weekly at minimum or as recommended       [facility]  Diabetes and Pregnancy Program

## 2021-02-22 ENCOUNTER — DOCUMENTATION (OUTPATIENT)
Dept: PERINATAL CARE | Facility: CLINIC | Age: 26
End: 2021-02-22

## 2021-02-22 ENCOUNTER — TELEPHONE (OUTPATIENT)
Dept: OBGYN CLINIC | Facility: MEDICAL CENTER | Age: 26
End: 2021-02-22

## 2021-02-22 ENCOUNTER — ROUTINE PRENATAL (OUTPATIENT)
Dept: OBGYN CLINIC | Facility: CLINIC | Age: 26
End: 2021-02-22

## 2021-02-22 VITALS — DIASTOLIC BLOOD PRESSURE: 80 MMHG | SYSTOLIC BLOOD PRESSURE: 130 MMHG | WEIGHT: 232 LBS | BODY MASS INDEX: 39.82 KG/M2

## 2021-02-22 DIAGNOSIS — Z3A.36 36 WEEKS GESTATION OF PREGNANCY: ICD-10-CM

## 2021-02-22 DIAGNOSIS — O10.919 CHRONIC HYPERTENSION AFFECTING PREGNANCY: ICD-10-CM

## 2021-02-22 DIAGNOSIS — O24.410 DIET CONTROLLED GESTATIONAL DIABETES MELLITUS (GDM) IN THIRD TRIMESTER: Primary | ICD-10-CM

## 2021-02-22 PROCEDURE — PNV: Performed by: OBSTETRICS & GYNECOLOGY

## 2021-02-22 PROCEDURE — 87150 DNA/RNA AMPLIFIED PROBE: CPT | Performed by: OBSTETRICS & GYNECOLOGY

## 2021-02-22 NOTE — TELEPHONE ENCOUNTER
The patient called in this early afternoon and stated that she is not sure if she is just constipated or she is impacted with stool  She stated that her stomach is very hard and that she is having pressure on her vaginal area  She also noted that she is feeling the baby move and that she has tried Colace, Miralax and a jossue enema  She then also noted that she has not passed any stool since 2 days ago and it was little pellet sized but not a lot came out  I spoke with Dr Jelena [address] and she stated that she would like the patient to stay on the Colace three times a day and also the Miralax and she can add one dose of Phillps Milk of Magnesia  Dr Jelena [address] also noted that if the patient feels impacted she is to go to the ER to be seen and she is to let us know if anything changes with the baby she is then to call our office and let us know and she may need to be evaluated at L&D    I then told the patient what was recommended and she states she will call and let us know if she goes to the hospital

## 2021-02-22 NOTE — PROGRESS NOTES
Date:  21  RE: Dakota Dumont    : 1995  Estimated Date of Delivery: 3/21/21  EGA: 36w2d  OB/GYN: [facility]            Via mychart       Current regimen:  2200 calorie gestational diabetes meal plan; 3 meals and 3 snacks recommended including combination of carb, protein and fat  Bedtime snack with 15 gms of carbohydrate and 2-3 ounces of protein  Eat every 2 to 3 5 hrs during the day  SMBG 4 times per day; fasting and 2 hours post meals with a OneTouch Verio blood glucose meter  No more than 8-10 hr fast from bedtime snack to breakfast meal  Noted EMR documentation from [facility] office that patient is struggling with constipation  Plan:  Continue current regimen  Advised to increase fiber in the diet with fresh fruit and vegetable, whole grain cereals and breads  Also suggested at least 64 fluid ounces of water with increased fiber to help with constipation  Patient has been advised to call [phone] to schedule Class 2 GDM education class  Insulin requirements increase week by week and insulin resistance also increases  Glucose goals fasting 60 to 90 and 2 hours post start of each meal 120 or less  Very important to keep glucose goals within recommendations to decrease risk factors  21 US impressions: fetal growth and NINA appeared normal   Noted scheduled twice weekly antepartum testing appointments    Date due to report next:  Monday, 3/1/21     [facility]  Diabetes and Pregnancy Program

## 2021-02-22 NOTE — PATIENT INSTRUCTIONS
Fetal Movement   WHAT YOU NEED TO KNOW:   Fetal movements are the kicks, rolls, and hiccups of your unborn baby  You may start to feel these movements when you are 20 weeks pregnant  The movements grow stronger and more frequent as your baby grows  Fetal movements show that your unborn baby is getting the oxygen and nutrients he needs before birth  Fewer fetal movements may signal a problem with your baby's health  DISCHARGE INSTRUCTIONS:   Follow up with your healthcare provider or obstetrician as directed:  Write down your questions so you remember to ask them during your visits  Normal fetal movement:  Fetal activity can be described by 4 states, from least to most active  During quiet sleep, your unborn baby may be still for up to 2 hours  During active sleep, he kicks, rolls, and moves often  During the quiet awake state, he may only move his eyes  The active awake state includes strong kicks and rolls  What affects fetal movement:  You may feel your baby move more after you eat, or after you drink caffeine  You may feel your baby move less while you are more active, such as when you exercise  You may also feel fewer movements if you are obese  Certain medicines can change your baby's movements  Tell your healthcare provider about the medicines you are taking  Track fetal movements at home:  Fetal movement is most often felt when you lie quietly on your side  Your healthcare provider may ask you to count movements for 2 hours  He may ask you to track how long it takes for your baby to move 10 times  Keep a log of your baby's movements  Contact your healthcare provider or obstetrician if:   · It takes longer than usual to feel 10 of your unborn baby's movements  · You do not feel your unborn baby move at least 10 times in 2 hours  · The skin on your hands, feet, and around your eyes is more swollen than usual      · You have a headache for at least 24 hours  · Tiny red dots appear on your skin  · Your belly is tender when you press on it  · You have questions or concerns about your condition or care  Return to the emergency department if:   · You do not feel your unborn baby move for 12 hours  · You feel cramping or constant pain in your abdomen  · You have heavy bleeding from your vagina  · You have a severe headache and cannot see clearly  · You are having trouble breathing or are vomiting  · You have a seizure  © Copyright [address] Information is for End User's use only and may not be sold, redistributed or otherwise used for commercial purposes  All illustrations and images included in CareNotes® are the copyrighted property of A D A M [employer] Inc  or [address] [place], [place] [address]   The above information is an  only  It is not intended as medical advice for individual conditions or treatments  Talk to your doctor, nurse or pharmacist before following any medical regimen to see if it is safe and effective for you

## 2021-02-23 LAB — GP B STREP DNA SPEC QL NAA+PROBE: NEGATIVE

## 2021-02-26 ENCOUNTER — ROUTINE PRENATAL (OUTPATIENT)
Dept: OBGYN CLINIC | Facility: MEDICAL CENTER | Age: 26
End: 2021-02-26
Payer: COMMERCIAL

## 2021-02-26 ENCOUNTER — TELEPHONE (OUTPATIENT)
Dept: OBGYN CLINIC | Facility: MEDICAL CENTER | Age: 26
End: 2021-02-26

## 2021-02-26 VITALS — BODY MASS INDEX: 39.48 KG/M2 | SYSTOLIC BLOOD PRESSURE: 124 MMHG | WEIGHT: 230 LBS | DIASTOLIC BLOOD PRESSURE: 82 MMHG

## 2021-02-26 DIAGNOSIS — O10.919 CHRONIC HYPERTENSION AFFECTING PREGNANCY: ICD-10-CM

## 2021-02-26 DIAGNOSIS — O99.213 OBESITY AFFECTING PREGNANCY IN THIRD TRIMESTER: ICD-10-CM

## 2021-02-26 DIAGNOSIS — Z3A.36 36 WEEKS GESTATION OF PREGNANCY: Primary | ICD-10-CM

## 2021-02-26 DIAGNOSIS — Z34.93 THIRD TRIMESTER PREGNANCY: ICD-10-CM

## 2021-02-26 DIAGNOSIS — O24.410 DIET CONTROLLED GESTATIONAL DIABETES MELLITUS (GDM) IN THIRD TRIMESTER: ICD-10-CM

## 2021-02-26 PROCEDURE — 76815 OB US LIMITED FETUS(S): CPT | Performed by: OBSTETRICS & GYNECOLOGY

## 2021-02-26 PROCEDURE — 59025 FETAL NON-STRESS TEST: CPT | Performed by: OBSTETRICS & GYNECOLOGY

## 2021-02-26 PROCEDURE — PNV: Performed by: OBSTETRICS & GYNECOLOGY

## 2021-02-26 NOTE — TELEPHONE ENCOUNTER
----- Message from Yung Harris sent at 2/26/2021  8:22 AM EST -----  Spoke with Lindsey Gaffney at L&D  Pt is scheduled for 3/14/21 at 6am   Pt is aware        ----- Message -----  From: Jameson Ortiz MD  Sent: 2/22/2021   9:13 AM EST  To: MD JADA Cuadra recommended IOL at 39 weeks  She really wants me to be there for it    Please schedule for IOL on 3/14 for GDM  Alofnzo, it's okay to call me for delivery

## 2021-03-01 ENCOUNTER — DOCUMENTATION (OUTPATIENT)
Dept: PERINATAL CARE | Facility: CLINIC | Age: 26
End: 2021-03-01

## 2021-03-01 NOTE — PROGRESS NOTES
Date:  21  RE: Anselmo Perrin    : 1995  Estimated Date of Delivery: 3/21/21  EGA: 37w1d  OB/GYN: [facility]            Via mychart       Current regimen:  2200 calorie gestational diabetes meal plan; 3 meals and 3 snacks recommended including combination of carb, protein and fat  Bedtime snack with 15 gms of carbohydrate and 2-3 ounces of protein  Eat every 2 to 3 5 hrs during the day  SMBG 4 times per day; fasting and 2 hours post meals with a OneTouch Verio blood glucose meter  No more than 8-10 hr fast from bedtime snack to breakfast meal  Noted EMR documentation from [facility] office that patient is struggling with constipation  Plan:  Continue current regimen  21 US impressions: fetal growth and NINA appeared normal   Noted scheduled twice weekly antepartum testing appointments      Date due to report next:  Monday, 3/8/21    Woodrow Argueta RD, LDN  [facility] Maternal Fetal Medicine  Diabetes and Pregnancy Program

## 2021-03-02 ENCOUNTER — TELEPHONE (OUTPATIENT)
Dept: OBGYN CLINIC | Facility: MEDICAL CENTER | Age: 26
End: 2021-03-02

## 2021-03-03 ENCOUNTER — ROUTINE PRENATAL (OUTPATIENT)
Dept: OBGYN CLINIC | Facility: CLINIC | Age: 26
End: 2021-03-03

## 2021-03-03 VITALS — BODY MASS INDEX: 39.69 KG/M2 | WEIGHT: 231.2 LBS

## 2021-03-03 DIAGNOSIS — O24.410 DIET CONTROLLED GESTATIONAL DIABETES MELLITUS (GDM) IN THIRD TRIMESTER: Primary | ICD-10-CM

## 2021-03-03 DIAGNOSIS — Z3A.37 37 WEEKS GESTATION OF PREGNANCY: ICD-10-CM

## 2021-03-03 DIAGNOSIS — O10.919 CHRONIC HYPERTENSION AFFECTING PREGNANCY: ICD-10-CM

## 2021-03-03 PROCEDURE — PNV: Performed by: OBSTETRICS & GYNECOLOGY

## 2021-03-03 NOTE — PROGRESS NOTES
Blanca Lin is a 22y o  year old  at 37w3d for routine prenatal visit    + FM, no vaginal bleeding, contractions, or LOF  Complaints: No   Most recent ultrasound and labs reviewed    CHTN  Well controlled not on meds / no headache , visual changes or epigastric pain  A1GDM - well controlled , following with diabetic in pregnancy program  NST reactive today   NINA 9 42  Has IOL scheduled for 39 weeks - patient states has been scheduled for   [address] and Labor precautions reviewed

## 2021-03-08 ENCOUNTER — PATIENT MESSAGE (OUTPATIENT)
Dept: PERINATAL CARE | Facility: CLINIC | Age: 26
End: 2021-03-08

## 2021-03-08 ENCOUNTER — TELEPHONE (OUTPATIENT)
Dept: OBGYN CLINIC | Facility: MEDICAL CENTER | Age: 26
End: 2021-03-08

## 2021-03-08 ENCOUNTER — DOCUMENTATION (OUTPATIENT)
Dept: PERINATAL CARE | Facility: CLINIC | Age: 26
End: 2021-03-08

## 2021-03-08 NOTE — TELEPHONE ENCOUNTER
Pt called in very concerned at 45 weeks pregnant  Pt stated that she does have gestational diabetes and that her sugar levels have been steadily decreasing and is wondering if this is cause for concern  Please review

## 2021-03-08 NOTE — TELEPHONE ENCOUNTER
States that FBS was 79 this morning and 2hour PP after breakfast was 95  Is feeling ok    Advised to send log to M as directed and also bring with at appointment on 3/10/2021

## 2021-03-08 NOTE — PROGRESS NOTES
Date:  03/10/21  RE: Eunice Fragoso    : 1995  Estimated Date of Delivery: 3/21/21  EGA: 38w3d  OB/GYN: [facility]            Via mychart      Current regimen:  2200 calorie gestational diabetes meal plan; 3 meals and 3 snacks  Bedtime snack with 15 gms of carbohydrate and 2-3 ounces of protein  Eat every 2 to 3 5 hrs during the day  SMBG 4 times per day; fasting and 2 hours post meals with a OneTouch Verio blood glucose meter  No more than 8-10 hr fast from bedtime snack to breakfast meal   Patient is struggling with constipation  Plan:  Continue current regimen  21 US impressions: fetal growth and NINA appeared normal   Noted scheduled twice weekly antepartum testing appointments      Date due to report next:  Monday, 3/15/21    Subhash Ruggiero, MS, RD, CDE  [facility]  Diabetes and Pregnancy Program

## 2021-03-10 ENCOUNTER — HOSPITAL ENCOUNTER (INPATIENT)
Facility: HOSPITAL | Age: 26
LOS: 1 days | Discharge: HOME | End: 2021-03-12
Attending: OBSTETRICS & GYNECOLOGY | Admitting: OBSTETRICS & GYNECOLOGY
Payer: COMMERCIAL

## 2021-03-10 ENCOUNTER — ROUTINE PRENATAL (OUTPATIENT)
Dept: OBGYN CLINIC | Facility: CLINIC | Age: 26
End: 2021-03-10
Payer: COMMERCIAL

## 2021-03-10 ENCOUNTER — TELEPHONE (OUTPATIENT)
Dept: OTHER | Facility: OTHER | Age: 26
End: 2021-03-10

## 2021-03-10 VITALS — BODY MASS INDEX: 39.48 KG/M2 | WEIGHT: 230 LBS | HEART RATE: 85 BPM

## 2021-03-10 VITALS — WEIGHT: 230 LBS | BODY MASS INDEX: 39.48 KG/M2 | DIASTOLIC BLOOD PRESSURE: 80 MMHG | SYSTOLIC BLOOD PRESSURE: 130 MMHG

## 2021-03-10 DIAGNOSIS — Z3A.38 38 WEEKS GESTATION OF PREGNANCY: ICD-10-CM

## 2021-03-10 DIAGNOSIS — O10.919 CHRONIC HYPERTENSION AFFECTING PREGNANCY: ICD-10-CM

## 2021-03-10 DIAGNOSIS — Z3A.38 38 WEEKS GESTATION OF PREGNANCY: Primary | ICD-10-CM

## 2021-03-10 DIAGNOSIS — O24.410 DIET CONTROLLED GESTATIONAL DIABETES MELLITUS (GDM) IN THIRD TRIMESTER: ICD-10-CM

## 2021-03-10 DIAGNOSIS — Z34.93 THIRD TRIMESTER PREGNANCY: ICD-10-CM

## 2021-03-10 PROCEDURE — 59025 FETAL NON-STRESS TEST: CPT | Performed by: OBSTETRICS & GYNECOLOGY

## 2021-03-10 PROCEDURE — PNV: Performed by: OBSTETRICS & GYNECOLOGY

## 2021-03-10 PROCEDURE — 76815 OB US LIMITED FETUS(S): CPT | Performed by: OBSTETRICS & GYNECOLOGY

## 2021-03-10 NOTE — PROGRESS NOTES
Assessment  22 y o  [place] R[place] at 38w3d presenting for routine prenatal visit  Plan  Diagnoses and all orders for this visit:    38 weeks gestation of pregnancy  Third trimester pregnancy  - Labor precautions  - FKC  - Follow up postpartum    Chronic hypertension affecting pregnancy  Diet controlled gestational diabetes mellitus (GDM) in third trimester  - Induction scheduled  - NST/NINA weekly (last scheduled today)      ____________________________________________________________        Subjective    Luda Hester is a 22 y o  [place] R[place] at 38w3d who presents for routine prenatal visit  She is feeling more pelvic pressure and heavier discharge  Denies contractions, loss of fluid, or vaginal bleeding  She feels regular fetal movements  Pregnancy Problems:  Patient Active Problem List   Diagnosis    Hypothyroid    Obesity affecting pregnancy in third trimester    Chronic hypertension affecting pregnancy    Diet controlled gestational diabetes mellitus (GDM) in third trimester         Objective  /80   Wt 104 kg (230 lb)   LMP 06/14/2020   BMI 39 48 kg/m²     NST:  NINA: 125 BPM / moderate / +accels  14 17cm   Uterine Size: size equals dates   Presentations: cephalic     Physical Exam:  Physical Exam  Constitutional:       General: She is not in acute distress  Appearance: Normal appearance  She is well-developed  She is not ill-appearing, toxic-appearing or diaphoretic  HENT:      Head: Normocephalic and atraumatic  Eyes:      General: No scleral icterus  Right eye: No discharge  Left eye: No discharge  Conjunctiva/sclera: Conjunctivae normal    Pulmonary:      Effort: Pulmonary effort is normal  No accessory muscle usage or respiratory distress  Abdominal:      General: There is distension (gravid)  Tenderness: There is no abdominal tenderness  There is no guarding or rebound  Skin:     General: Skin is warm and dry  Coloration: Skin is not jaundiced  Findings: No bruising, erythema or rash  Neurological:      Mental Status: She is alert  Psychiatric:         Mood and Affect: Mood normal          Behavior: Behavior normal          Thought Content:  Thought content normal          Judgment: Judgment normal

## 2021-03-11 ENCOUNTER — ANESTHESIA (INPATIENT)
Dept: ANESTHESIOLOGY | Facility: HOSPITAL | Age: 26
End: 2021-03-11
Payer: COMMERCIAL

## 2021-03-11 ENCOUNTER — ANESTHESIA EVENT (INPATIENT)
Dept: ANESTHESIOLOGY | Facility: HOSPITAL | Age: 26
End: 2021-03-11
Payer: COMMERCIAL

## 2021-03-11 PROBLEM — O99.213 OBESITY AFFECTING PREGNANCY IN THIRD TRIMESTER: Chronic | Status: ACTIVE | Noted: 2020-11-09

## 2021-03-11 LAB
ABO GROUP BLD: NORMAL
ALBUMIN SERPL BCP-MCNC: 2.6 G/DL (ref 3.5–5)
ALP SERPL-CCNC: 115 U/L (ref 46–116)
ALT SERPL W P-5'-P-CCNC: 104 U/L (ref 12–78)
ANION GAP SERPL CALCULATED.3IONS-SCNC: 9 MMOL/L (ref 4–13)
AST SERPL W P-5'-P-CCNC: 61 U/L (ref 5–45)
BASE EXCESS BLDCOA CALC-SCNC: -2 MMOL/L (ref 3–11)
BASE EXCESS BLDCOV CALC-SCNC: -2.7 MMOL/L (ref 1–9)
BILIRUB SERPL-MCNC: 0.23 MG/DL (ref 0.2–1)
BLD GP AB SCN SERPL QL: NEGATIVE
BUN SERPL-MCNC: 12 MG/DL (ref 5–25)
CALCIUM ALBUM COR SERPL-MCNC: 10.1 MG/DL (ref 8.3–10.1)
CALCIUM SERPL-MCNC: 9 MG/DL (ref 8.3–10.1)
CHLORIDE SERPL-SCNC: 102 MMOL/L (ref 100–108)
CO2 SERPL-SCNC: 26 MMOL/L (ref 21–32)
CREAT SERPL-MCNC: 0.73 MG/DL (ref 0.6–1.3)
CREAT UR-MCNC: 119.3 MG/DL
ERYTHROCYTE [DISTWIDTH] IN BLOOD BY AUTOMATED COUNT: 13.5 % (ref 11.6–15.1)
ERYTHROCYTE [DISTWIDTH] IN BLOOD BY AUTOMATED COUNT: 13.6 % (ref 11.6–15.1)
GFR SERPL CREATININE-BSD FRML MDRD: 115 ML/MIN/1.73SQ M
GLUCOSE SERPL-MCNC: 75 MG/DL (ref 65–140)
GLUCOSE SERPL-MCNC: 93 MG/DL (ref 65–140)
GLUCOSE SERPL-MCNC: 95 MG/DL (ref 65–140)
HCO3 BLDCOA-SCNC: 24.6 MMOL/L (ref 17.3–27.3)
HCO3 BLDCOV-SCNC: 23.2 MMOL/L (ref 12.2–28.6)
HCT VFR BLD AUTO: 38.4 % (ref 34.8–46.1)
HCT VFR BLD AUTO: 38.9 % (ref 34.8–46.1)
HGB BLD-MCNC: 12.3 G/DL (ref 11.5–15.4)
HGB BLD-MCNC: 12.8 G/DL (ref 11.5–15.4)
MCH RBC QN AUTO: 30.4 PG (ref 26.8–34.3)
MCH RBC QN AUTO: 30.7 PG (ref 26.8–34.3)
MCHC RBC AUTO-ENTMCNC: 32 G/DL (ref 31.4–37.4)
MCHC RBC AUTO-ENTMCNC: 32.9 G/DL (ref 31.4–37.4)
MCV RBC AUTO: 93 FL (ref 82–98)
MCV RBC AUTO: 95 FL (ref 82–98)
O2 CT VFR BLDCOA CALC: 9.3 ML/DL
OXYHGB MFR BLDCOA: 43.2 %
OXYHGB MFR BLDCOV: 58.9 %
PCO2 BLDCOA: 48.5 MM[HG] (ref 30–60)
PCO2 BLDCOV: 44 MM HG (ref 27–43)
PH BLDCOA: 7.32 [PH] (ref 7.23–7.43)
PH BLDCOV: 7.34 [PH] (ref 7.19–7.49)
PLATELET # BLD AUTO: 323 THOUSANDS/UL (ref 149–390)
PLATELET # BLD AUTO: 353 THOUSANDS/UL (ref 149–390)
PMV BLD AUTO: 10.1 FL (ref 8.9–12.7)
PMV BLD AUTO: 10.3 FL (ref 8.9–12.7)
PO2 BLDCOA: 19.1 MM HG (ref 5–25)
PO2 BLDCOV: 24.6 MM HG (ref 15–45)
POTASSIUM SERPL-SCNC: 4.2 MMOL/L (ref 3.5–5.3)
PROT SERPL-MCNC: 6.7 G/DL (ref 6.4–8.2)
PROT UR-MCNC: 15 MG/DL
PROT/CREAT UR: 0.13 MG/G{CREAT} (ref 0–0.1)
RBC # BLD AUTO: 4.05 MILLION/UL (ref 3.81–5.12)
RBC # BLD AUTO: 4.17 MILLION/UL (ref 3.81–5.12)
RH BLD: POSITIVE
RPR SER QL: NORMAL
SAO2 % BLDCOV: 13 ML/DL
SODIUM SERPL-SCNC: 137 MMOL/L (ref 136–145)
SPECIMEN EXPIRATION DATE: NORMAL
WBC # BLD AUTO: 13.66 THOUSAND/UL (ref 4.31–10.16)
WBC # BLD AUTO: 14.83 THOUSAND/UL (ref 4.31–10.16)

## 2021-03-11 PROCEDURE — 82948 REAGENT STRIP/BLOOD GLUCOSE: CPT

## 2021-03-11 PROCEDURE — 4A1HXCZ MONITORING OF PRODUCTS OF CONCEPTION, CARDIAC RATE, EXTERNAL APPROACH: ICD-10-PCS | Performed by: OBSTETRICS & GYNECOLOGY

## 2021-03-11 PROCEDURE — 99213 OFFICE O/P EST LOW 20 MIN: CPT

## 2021-03-11 PROCEDURE — NC001 PR NO CHARGE: Performed by: OBSTETRICS & GYNECOLOGY

## 2021-03-11 PROCEDURE — 82805 BLOOD GASES W/O2 SATURATION: CPT | Performed by: OBSTETRICS & GYNECOLOGY

## 2021-03-11 PROCEDURE — 86592 SYPHILIS TEST NON-TREP QUAL: CPT | Performed by: OBSTETRICS & GYNECOLOGY

## 2021-03-11 PROCEDURE — 82570 ASSAY OF URINE CREATININE: CPT | Performed by: OBSTETRICS & GYNECOLOGY

## 2021-03-11 PROCEDURE — 86901 BLOOD TYPING SEROLOGIC RH(D): CPT | Performed by: OBSTETRICS & GYNECOLOGY

## 2021-03-11 PROCEDURE — 86900 BLOOD TYPING SEROLOGIC ABO: CPT | Performed by: OBSTETRICS & GYNECOLOGY

## 2021-03-11 PROCEDURE — 86850 RBC ANTIBODY SCREEN: CPT | Performed by: OBSTETRICS & GYNECOLOGY

## 2021-03-11 PROCEDURE — 59400 OBSTETRICAL CARE: CPT | Performed by: OBSTETRICS & GYNECOLOGY

## 2021-03-11 PROCEDURE — 0UQMXZZ REPAIR VULVA, EXTERNAL APPROACH: ICD-10-PCS | Performed by: OBSTETRICS & GYNECOLOGY

## 2021-03-11 PROCEDURE — 85027 COMPLETE CBC AUTOMATED: CPT | Performed by: OBSTETRICS & GYNECOLOGY

## 2021-03-11 PROCEDURE — 84156 ASSAY OF PROTEIN URINE: CPT | Performed by: OBSTETRICS & GYNECOLOGY

## 2021-03-11 PROCEDURE — 80053 COMPREHEN METABOLIC PANEL: CPT | Performed by: OBSTETRICS & GYNECOLOGY

## 2021-03-11 RX ORDER — SIMETHICONE 80 MG
80 TABLET,CHEWABLE ORAL 4 TIMES DAILY PRN
Status: DISCONTINUED | OUTPATIENT
Start: 2021-03-11 | End: 2021-03-12 | Stop reason: HOSPADM

## 2021-03-11 RX ORDER — IBUPROFEN 600 MG/1
600 TABLET ORAL EVERY 6 HOURS PRN
Status: DISCONTINUED | OUTPATIENT
Start: 2021-03-11 | End: 2021-03-12 | Stop reason: HOSPADM

## 2021-03-11 RX ORDER — OXYCODONE HYDROCHLORIDE 5 MG/1
5 TABLET ORAL EVERY 4 HOURS PRN
Status: DISCONTINUED | OUTPATIENT
Start: 2021-03-11 | End: 2021-03-12 | Stop reason: HOSPADM

## 2021-03-11 RX ORDER — CALCIUM CARBONATE 200(500)MG
1000 TABLET,CHEWABLE ORAL DAILY PRN
Status: DISCONTINUED | OUTPATIENT
Start: 2021-03-11 | End: 2021-03-12 | Stop reason: HOSPADM

## 2021-03-11 RX ORDER — SODIUM CHLORIDE 9 MG/ML
125 INJECTION, SOLUTION INTRAVENOUS CONTINUOUS
Status: DISCONTINUED | OUTPATIENT
Start: 2021-03-11 | End: 2021-03-11

## 2021-03-11 RX ORDER — ROPIVACAINE HYDROCHLORIDE 2 MG/ML
INJECTION, SOLUTION EPIDURAL; INFILTRATION; PERINEURAL
Status: COMPLETED
Start: 2021-03-11 | End: 2021-03-11

## 2021-03-11 RX ORDER — LIDOCAINE HYDROCHLORIDE AND EPINEPHRINE 15; 5 MG/ML; UG/ML
INJECTION, SOLUTION EPIDURAL
Status: COMPLETED | OUTPATIENT
Start: 2021-03-11 | End: 2021-03-11

## 2021-03-11 RX ORDER — ACETAMINOPHEN 325 MG/1
650 TABLET ORAL EVERY 6 HOURS PRN
Status: DISCONTINUED | OUTPATIENT
Start: 2021-03-11 | End: 2021-03-11

## 2021-03-11 RX ORDER — DOCUSATE SODIUM 100 MG/1
100 CAPSULE, LIQUID FILLED ORAL 2 TIMES DAILY
Status: DISCONTINUED | OUTPATIENT
Start: 2021-03-11 | End: 2021-03-12 | Stop reason: HOSPADM

## 2021-03-11 RX ORDER — OXYTOCIN/RINGER'S LACTATE 30/500 ML
PLASTIC BAG, INJECTION (ML) INTRAVENOUS
Status: COMPLETED
Start: 2021-03-11 | End: 2021-03-11

## 2021-03-11 RX ORDER — IBUPROFEN 600 MG/1
600 TABLET ORAL EVERY 6 HOURS PRN
Status: DISCONTINUED | OUTPATIENT
Start: 2021-03-11 | End: 2021-03-11

## 2021-03-11 RX ORDER — ROPIVACAINE HYDROCHLORIDE 2 MG/ML
INJECTION, SOLUTION EPIDURAL; INFILTRATION; PERINEURAL AS NEEDED
Status: DISCONTINUED | OUTPATIENT
Start: 2021-03-11 | End: 2021-03-11 | Stop reason: HOSPADM

## 2021-03-11 RX ORDER — LEVOTHYROXINE SODIUM 88 UG/1
88 TABLET ORAL
Status: DISCONTINUED | OUTPATIENT
Start: 2021-03-11 | End: 2021-03-12 | Stop reason: HOSPADM

## 2021-03-11 RX ORDER — DIAPER,BRIEF,INFANT-TODD,DISP
1 EACH MISCELLANEOUS 4 TIMES DAILY PRN
Status: DISCONTINUED | OUTPATIENT
Start: 2021-03-11 | End: 2021-03-12 | Stop reason: HOSPADM

## 2021-03-11 RX ORDER — SODIUM CHLORIDE, SODIUM LACTATE, POTASSIUM CHLORIDE, CALCIUM CHLORIDE 600; 310; 30; 20 MG/100ML; MG/100ML; MG/100ML; MG/100ML
125 INJECTION, SOLUTION INTRAVENOUS CONTINUOUS
Status: DISCONTINUED | OUTPATIENT
Start: 2021-03-11 | End: 2021-03-11

## 2021-03-11 RX ADMIN — BENZOCAINE AND LEVOMENTHOL 1 APPLICATION: 200; 5 SPRAY TOPICAL at 07:53

## 2021-03-11 RX ADMIN — WITCH HAZEL 1 PAD: 500 SOLUTION RECTAL; TOPICAL at 07:54

## 2021-03-11 RX ADMIN — ROPIVACAINE HYDROCHLORIDE 5 ML: 2 INJECTION, SOLUTION EPIDURAL; INFILTRATION; PERINEURAL at 04:26

## 2021-03-11 RX ADMIN — PRENATAL VIT W/ FE FUMARATE-FA TAB 27-0.8 MG 1 TABLET: 27-0.8 TAB at 08:23

## 2021-03-11 RX ADMIN — LEVOTHYROXINE SODIUM 88 MCG: 88 TABLET ORAL at 06:54

## 2021-03-11 RX ADMIN — ROPIVACAINE HYDROCHLORIDE: 2 INJECTION, SOLUTION EPIDURAL; INFILTRATION at 04:35

## 2021-03-11 RX ADMIN — SODIUM CHLORIDE, SODIUM LACTATE, POTASSIUM CHLORIDE, AND CALCIUM CHLORIDE 999 ML/HR: .6; .31; .03; .02 INJECTION, SOLUTION INTRAVENOUS at 03:15

## 2021-03-11 RX ADMIN — DOCUSATE SODIUM 100 MG: 100 CAPSULE, LIQUID FILLED ORAL at 08:23

## 2021-03-11 RX ADMIN — SODIUM CHLORIDE, SODIUM LACTATE, POTASSIUM CHLORIDE, AND CALCIUM CHLORIDE 999 ML/HR: .6; .31; .03; .02 INJECTION, SOLUTION INTRAVENOUS at 04:13

## 2021-03-11 RX ADMIN — LIDOCAINE HYDROCHLORIDE AND EPINEPHRINE 3 ML: 15; 5 INJECTION, SOLUTION EPIDURAL at 04:26

## 2021-03-11 RX ADMIN — ROPIVACAINE HYDROCHLORIDE 5 ML: 2 INJECTION, SOLUTION EPIDURAL; INFILTRATION; PERINEURAL at 04:29

## 2021-03-11 RX ADMIN — DOCUSATE SODIUM 100 MG: 100 CAPSULE, LIQUID FILLED ORAL at 18:37

## 2021-03-11 RX ADMIN — IBUPROFEN 600 MG: 600 TABLET ORAL at 08:23

## 2021-03-11 RX ADMIN — IBUPROFEN 600 MG: 600 TABLET ORAL at 15:24

## 2021-03-11 RX ADMIN — Medication: at 06:00

## 2021-03-11 RX ADMIN — IBUPROFEN 600 MG: 600 TABLET ORAL at 21:47

## 2021-03-11 NOTE — H&P
History & Physical - OB/GYN   Tony Martinez 22 y o  female MRN: [id number]  Unit/Bed#: L&D 325-01 Encounter: [id number]    Chief complaint:  contractions    HPI:  Contractions:  yes  Fetal movement:  yes  Vaginal bleeding:   no  Leaking of fluid:  yes    Tony Martinez is a 22 y o   female at 38w3d by LMP who presents with regular contractions q5-10 min  She has also noted some possible leakage of fluid  Rupture workup was negative  Patient was initially found to be 3/70/-1  After 2 hours, her cervical exam changed to 4/70/0  After 1 5 additional hours, she was found to be in active labor at 6/80/0  Patient was originally scheduled for IOL for A1GDM on Denny 3/14  She is a patient of [facility]  Pertinent Hx:  - Hx   (6lb)  - cHTN - had elevated BP at 18w1d, 23w3d, and 30w2d  Pt taking ASA daily    - A1GDM - sugars have been well-controlled   - Hypothyroidism - takes levothyroxine 88 mcg daily   - BMI 39     She is GBS negative  She is not allergic to PCN  She is Rh positive  She denies history of DVT  She would like an epidural for labor       Problem List:  Patient Active Problem List   Diagnosis    Hypothyroid    Obesity affecting pregnancy in third trimester    Chronic hypertension affecting pregnancy    Diet controlled gestational diabetes mellitus (GDM) in third trimester       PMH:  Past Medical History:   Diagnosis Date    Chronic hypertension affecting pregnancy 2020    Disease of thyroid gland     Gestational diabetes mellitus (GDM) in third trimester 2021    Hypothyroid     Seasonal allergies     Urinary tract infection     Varicella        OB Hx:  OB History    Para Term  AB Living   2 1 1 0 0 1   SAB TAB Ectopic Multiple Live Births   0 0 0 0 1      # Outcome Date GA Lbr Rohan/2nd Weight Sex Delivery Anes PTL Lv   2 Current            1 Term 16 37w6d  3147 g (6 lb 15 oz) M Vag-Spont EPI N KALEE      Name: Ree Marie Apgar1: 9  Apgar5: 9       PSH:  Past Surgical History:   Procedure Laterality Date    KNEE CARTILAGE SURGERY Right     WISDOM TOOTH EXTRACTION      WRIST SURGERY Left        Social Hx:  Denies history of tobacco, alcohol, or other drug use  Meds:  No current facility-administered medications on file prior to encounter  Current Outpatient Medications on File Prior to Encounter   Medication Sig Dispense Refill    Accu-Chek Softclix Lancets lancets Use 4 a day or as instructed 100 each 3    aspirin (ECOTRIN LOW STRENGTH) 81 mg EC tablet Take 81 mg by mouth daily      Blood Glucose Monitoring Suppl (Accu-Chek Guide) w/Device KIT Use 4 (four) times a day 1 kit 0    Blood Glucose Monitoring Suppl (OneTouch Verio Flex System) w/Device KIT Test 4 times per day fasting and 2 hours after the start of each meal 1 kit 0    glucose blood (Accu-Chek Guide) test strip Test 4 times per day fasting and 2 hours after the start of each meal 100 each 3    levothyroxine 88 mcg tablet Take 88 mcg by mouth daily      OneTouch Delica Lancets 24N MISC Test 4 times per day fasting and 2 hours after the start of each meal 100 each 3    OneTouch Verio test strip Test 4 times per day fasting and 2 hours after the start of each meal 100 each 3    Prenatal Vit-Fe Fumarate-FA (PRENATAL COMPLETE PO) Take 1 tablet by mouth daily  Allergies: Allergies   Allergen Reactions    Pollen Extract        Labs:  Blood type: AB+  Antibody: negative  Group B strep: negative  HIV: negative   Hepatitis B: negative  RPR: negative  Rubella: immune  Varicella: unknown  1 hour Glucose: 153  3 hour Glucose: 108 fasting    Physical Exam:  /76   Pulse 100   Temp 98 2 °F (36 8 °C) (Oral)   Resp 18   Ht 5' 4" (1 626 m)   Wt 104 kg (230 lb)   LMP 06/14/2020   BMI 39 48 kg/m²   Gen: NAD, cooperative, pleasant, appears uncomfortable   Cardio: RRR, S1/S2 normal   Lungs: good effort, CTAB  Abdomen: soft, nontender  Gravid uterus  Body mass index is 39 48 kg/m²  Estimated Fetal Weight: 2230 grams - 4 lbs 15 oz (26%) ()  Presentation: vertex on U/S at routine prenatal visit 3/10    SVE:   Cervical Dilation: 6  Cervical Effacement: 80  Cervical Consistency: Soft  Fetal Station: 0  Presentation: Vertex  Position: Unknown  Method: Manual  OB Examiner: Christi Ortiz      FHT:   Baseline Rate: 130 bpm  Variability: Moderate 6-25 bpm  Accelerations: 15 x 15 or greater  Decelerations: Intermittent late decelerations noted    Prosper:  Contraction Frequency (minutes): 7 5-9  Contraction Duration (seconds): 70-90  Contraction Quality: Mild    Membranes: intact  Placenta: anterior     Assessment:   22 y o   at 38w4d, pregnancy notable for cHTN, BMI 39, A1GDM, hypothyroidism, transitioning from latent labor to active labor  Plan:   - Admit to L&D for active labor   - Expectant management for now   - CBC, RPR, type and screen  - GBS negative - PCN not indicated   - Rh positive - Rhogam not indicated   - A1GDM - 2 hour blood glucose checks x 4; if WNL space to q4h   - Hypothyroidism - continue levothyroxine 88mcg  - cHTN - no elevated BP's on admission  Pt asymptomatic  Can hold off on preeclampsia labs for now  Continue to monitor blood pressures closely  - Place IV   - IVF: LR 125cc/hr  - Clear liquid diet   - Epidural upon request    Discussed with Dr Raul Mujica MD  PGY-2, OB/GYN  3/11/2021 3:01 AM

## 2021-03-11 NOTE — QUICK NOTE
Patient evaluated at 5:15 pm after reviewing labs with new finding of elevated LFTs  AST/ALT were incidentally found to be 61/104  Prior CMP on 11/20/2020 was AST/ALT 14/11  Blood pressures have been 120-140s/60-80s  She is asymptomatic and feeling well pain-wise  Other labs including Cr, Plts, Hgb, and P/C ratio were WNL  Pt does not meet criteria for preeclampsia  Will obtain repeat CMP tomorrow to ensure LFTs are downtrending  Plan D/w Dr Bhumi Gee

## 2021-03-11 NOTE — PLAN OF CARE
Problem: Knowledge Deficit  Goal: Verbalizes understanding of labor plan  Description: Assess patient/family/caregiver's baseline knowledge level and ability to understand information  Provide education via patient/family/caregiver's preferred learning method at appropriate level of understanding  1  Provide teaching at level of understanding  2  Provide teaching via preferred learning method(s)  3/11/2021 0643 by Gisselle Dodson RN  Outcome: Completed  3/11/2021 0439 by Gisselle Dodson RN  Outcome: Progressing     Problem: Labor & Delivery  Goal: Manages discomfort  Description: Assess and monitor for signs and symptoms of discomfort  Assess patient's pain level regularly and per hospital policy  Administer medications as ordered  Support use of nonpharmacological methods to help control pain such as distraction, imagery, relaxation, and application of heat and cold  Collaborate with interdisciplinary team and patient to determine appropriate pain management plan  1  Include patient in decisions related to comfort  2  Offer non-pharmacological pain management interventions  3  Report ineffective pain management to physician  3/11/2021 0643 by Gisselle Dodson RN  Outcome: Completed  3/11/2021 0439 by Gisselle Dodson RN  Outcome: Progressing  Goal: Patient vital signs are stable  Description: 1  Assess vital signs - vaginal delivery    3/11/2021 0643 by Gisselle Dodson RN  Outcome: Completed  3/11/2021 0439 by Gisselle Dodson RN  Outcome: Progressing

## 2021-03-11 NOTE — DISCHARGE INSTRUCTIONS
Vaginal Delivery   WHAT YOU SHOULD KNOW:   A vaginal delivery is the birth of your baby through your vagina (birth canal)  AFTER YOU LEAVE:   Medicines:  · NSAIDs  help decrease swelling and pain or fever  This medicine is available with or without a doctor's order  NSAIDs can cause stomach bleeding or kidney problems in certain people  If you take blood thinner medicine, always ask your healthcare provider if NSAIDs are safe for you  Always read the medicine label and follow directions  · Take your medicine as directed  Call your healthcare provider if you think your medicine is not helping or if you have side effects  Tell him if you are allergic to any medicine  Keep a list of the medicines, vitamins, and herbs you take  Include the amounts, and when and why you take them  Bring the list or the pill bottles to follow-up visits  Carry your medicine list with you in case of an emergency  Follow up with your primary healthcare provider:  Most women need to return 6 weeks after a vaginal delivery  Ask about how to care for your wounds or stitches  Write down your questions so you remember to ask them during your visits  Activity:  Rest as much as possible  Try to keep all activities short  You may be able to do some exercise soon after you have your baby  Talk with your primary healthcare provider before you start exercising  If you work outside the home, ask when you can return to your job  Kegel exercises:  Kegel exercises may help your vaginal and rectal muscles heal faster  You can do Kegel exercises by tightening and relaxing the muscles around your vagina  Kegel exercises help make the muscles stronger  Breast care:  When your milk comes in, your breasts may feel full and hard  Ask how to care for your breasts, even if you are not breastfeeding  Constipation:  Do not try to push the bowel movement out if it is too hard   High-fiber foods, extra liquids, and regular exercise can help you prevent constipation  Examples of high-fiber foods are fruit and bran  Prune juice and water are good liquids to drink  Regular exercise helps your digestive system work  You may also be told to take over-the-counter fiber and stool softener medicines  Take these items as directed  Hemorrhoids:  Pregnancy can cause severe hemorrhoids  You may have rectal pain because of the hemorrhoids  Ask how to prevent or treat hemorrhoids  Perineum care: Your perineum is the area between your vagina and anus  Keep the area clean and dry to help it heal and to prevent infection  Wash the area gently with soap and water when you bathe or shower  Rinse your perineum with warm water when you use the toilet  Your primary healthcare provider may suggest you use a warm sitz bath to help decrease pain  A sitz bath is a bathtub or basin filled to hip level  Stay in the sitz bath for 20 to 30 minutes, or as directed  Vaginal discharge: You will have vaginal discharge, called lochia, after your delivery  The lochia is bright red the first day or two after the birth  By the fourth day, the amount decreases, and it turns red-brown  Use a sanitary pad rather than a tampon to prevent a vaginal infection  It is normal to have lochia up to 8 weeks after your baby is born  Monthly periods: Your period may start again within 7 to 12 weeks after your baby is born  If you are breastfeeding, it may take longer for your period to start again  You can still get pregnant again even though you do not have your monthly period  Talk with your primary healthcare provider about a birth control method that will be good for you if you do not want to get pregnant  Mood changes: Many new mothers have some kind of mood changes after delivery  Some of these changes occur because of lack of sleep, hormone changes, and caring for a new baby  Some mood changes can be more serious, such as postpartum depression   Talk with your primary healthcare provider if you feel unable to care for yourself or your baby  Sexual activity:  You may need to avoid sex for 6 to 7 weeks after you have your baby  You may notice you have a decreased desire for sex, or sex may be painful  You may need to use a vaginal lubricant (gel) to help make sex more comfortable  Contact your primary healthcare provider if:   · You have heavy vaginal bleeding that fills 1 or more sanitary pads in 1 hour  · You have a fever  · Your pain does not go away, or gets worse  · The skin between your vagina and rectum is swollen, warm, or red  · You have swollen, hard, or painful breasts  · You feel very sad or depressed  · You feel more tired than usual      · You have questions or concerns about your condition or care  Seek care immediately or call 911 if:   · You have pus or yellow drainage coming from your vagina or wound  · You are urinating very little, or not at all  · Your arm or leg feels warm, tender, and painful  It may look swollen and red  · You feel lightheaded, have sudden and worsening chest pain, or trouble breathing  You may have more pain when you take deep breaths or cough, or you may cough up blood  © 2014 [address] is for End User's use only and may not be sold, redistributed or otherwise used for commercial purposes  All illustrations and images included in CareNotes® are the copyrighted property of [employer]  or Javid Chatman  The above information is an  only  It is not intended as medical advice for individual conditions or treatments  Talk to your doctor, nurse or pharmacist before following any medical regimen to see if it is safe and effective for you  Self Care After Delivery   AMBULATORY CARE:   The postpartum period  is the period of time from delivery to about 6 weeks  During this time you may experience many physical and emotional changes   It is important to understand what is normal and when you need to call your healthcare provider  It is also important to know how to care for yourself during this time  Call your local emergency number (911 in the [address]) for any of the following:   · You see or hear things that are not there, or have thoughts of harming yourself or your baby  · You soak through 1 pad in 15 minutes, have blurry vision, clammy or pale skin, and feel faint  · You faint or lose consciousness  · You have trouble breathing  · You cough up blood  · Your  incision comes apart  Seek care immediately if:   · Your heart is beating faster than usual     · You have a bad headache or changes in your vision  · Your episiotomy or  incision is red, swollen, bleeding, or draining pus  · You have severe abdominal pain  Call your doctor or obstetrician if:   · Your leg is painful, red, and larger than usual     · You soak through 1 or more pads in an hour, or pass blood clots larger than a quarter from your vagina  · You have a fever  · You have new or worsening pain in your abdomen or vagina  · You continue to have depression 1 to 2 weeks after you deliver  · You have trouble sleeping  · You have foul-smelling discharge from your vagina  · You have pain or burning when you urinate  · You do not have a bowel movement for 3 days or more  · You have nausea or are vomiting  · You have hard lumps or red streaks over your breasts  · You have cracked nipples or bleed from your nipples  · You have questions or concerns about your condition or care  Physical changes:   The following are normal changes after you give birth:  · Pain in the area between your anus and vagina    · Breast pain    · Constipation or hemorrhoids    · Hot or cold flashes    · Vaginal bleeding or discharge    · Mild to moderate abdominal cramping    · Difficulty controlling bowel movements or urine    Emotional changes:  A drop in hormone levels after you deliver may cause changes in your emotions  You may feel irritable, sad, or anxious  You may cry easily or for no reason  You may also feel depressed  Depression that continues can be a sign of postpartum depression, a condition that can be treated  Treatment may include talk therapy, medicines, or both  Healthcare providers will ask how you are feeling and if you have any depression  These talks can happen during appointments for your medical care and for your baby's care, such as well child visits  Providers can help you find ways to care for yourself and your baby  Talk to your providers about the following:  · When emotional changes or depression started, and if it is getting worse over time    · Problems you are having with daily activities, sleep, or caring for your baby    · If anything makes you feel worse, or makes you feel better    · Feeling that you are not bonding with your baby the way you want    · Any problems your baby has with sleeping or feeding    · Your baby is fussy or cries a lot    · Support you have from friends, family, or others    Breast care for breastfeeding mothers: You may have sore breasts for 3 to 6 days after you give birth  This happens as your milk begins to fill your breasts  You may also have sore breasts if you do not breastfeed frequently  Do the following to care for your breasts:  · Apply a moist, warm, compress to your breast as directed  This may help soothe your breasts  Make sure the washcloth is not too hot before you apply it to your breast     · Nurse your baby or pump your milk frequently  This may prevent clogged milk ducts  Ask your healthcare provider how often to nurse or pump  · Massage your breasts as directed  This may help increase your milk flow  Gently rub your breasts in a circular motion before you breastfeed  You may need to gently squeeze your breast or nipple to help release milk   You can also use a breast pump to help release milk from your breast     · Wash your breasts with warm water only  Do not put soap on your nipples  Soap may cause your nipples to become dry  · Apply lanolin cream to your nipples as directed  Lanolin cream may add moisture to your skin and prevent nipple dryness  Always  wash off lanolin cream with warm water before you breastfeed  · Place pads in your bra  Your nipples may leak milk when you are not breastfeeding  You can place pads inside of your bra to help prevent leaking onto your clothing  Ask your healthcare provider where to purchase bra pads  · Get breastfeeding support if needed  Healthcare providers can answer questions about breastfeeding and provide you with support  Ask your healthcare provider who you can contact if you need breastfeeding support  Breast care for non-breastfeeding mothers:  Milk will fill your breasts even if you bottle feed your baby  Do the following to help stop your milk from filling your breasts and causing pain:  · Wear a bra with support at all times  A sports bra or a tight-fitting bra will help stop your milk from coming in  · Apply ice on each breast for 15 to 20 minutes every hour or as directed  Use an ice pack, or put crushed ice in a plastic bag  Cover it with a towel before you apply it to your breast  Ice helps your milk ducts shrink  · Keep your breasts away from warm water  Warm water will make it easier for milk to fill your breasts  Stand with your breasts away from warm water in the shower  · Limit how much you touch your breasts  This will prevent them from filling with milk  Perineum care: Your perineum is the area between your rectum and vagina  It is normal to have swelling and pain in this area after you give birth  If you had an episiotomy, your healthcare provider may give you special instructions  · Clean your perineum after you use the bathroom  This may prevent infection and help with healing   Use a spray bottle with warm water to clean your perineum  You may also gently spray warm water against your perineum when you urinate  Always wipe front to back  · Take a sitz bath as directed  A sitz bath may help relieve swelling and pain  Fill your bath tub or bucket with water up to your hips and sit in the water  Use cold water for 2 days after you deliver  Then use warm water  Ask your healthcare provider for more information about a sitz bath  · Apply ice packs for the first 24 hours or as directed  Use a plastic glove filled with ice or buy an ice pack  Wrap the ice pack or plastic glove in a small towel or wash cloth  Place the ice pack on your perineum for 20 minutes at a time  · Sit on a donut-shaped pillow  This may relieve pressure on your perineum when you sit  · Use wipes that contain medicine or take pills as directed  Your healthcare provider may tell you to use witch hazel pads  You can place witch hazel pads in the refrigerator before you apply them to your perineum  Your provider may also tell you to take NSAIDs  Ask him or her how often to take pills or use the wipes  · Do not go swimming or take tub baths for 4 to 6 weeks or as directed  This will help prevent an infection in your vagina or uterus  Bowel and bladder care: It may take 3 to 5 days to have a bowel movement after you deliver your baby  You can do the following to prevent or manage constipation, and get control of your bowel or bladder:  · Take stool softeners as directed  A stool softener is medicine that will make your bowel movements softer  This may prevent or relieve constipation  A stool softener may also make bowel movements less painful  · Drink plenty of liquids  Ask how much liquid to drink each day and which liquids are best for you  Liquids may help prevent constipation  · Eat foods high in fiber  Examples include fruits, vegetables, grains, beans, and lentils  Ask your healthcare provider how much fiber you need each day  Fiber may prevent constipation  · Do Kegel exercises as directed  Kegel exercises will help strengthen the muscles that control bowel movements and urination  Ask your healthcare provider for more information on Kegel exercises  · Apply cold compresses or medicine to hemorrhoids as directed  This may relieve swelling and pain  Your healthcare provider may tell you to apply ice or wipes that contain medicine to your hemorrhoids  He or she may also tell you to use a sitz bath  Ask your provider for more information on how to manage hemorrhoids  Nutrition:  Good nutrition is important in the postpartum period  It will help you return to a healthy weight, increase your energy levels, and prevent constipation  It will also help you get enough nutrients and calories if you are going to breastfeed your baby  · Eat a variety of healthy foods  Healthy foods include fruits, vegetables, whole-grain breads, low-fat dairy products, beans, lean meats, and fish  You may need 500 to 700 extra calories each day if you breastfeed your baby  You may also need extra protein  · Limit foods with added sugar and high amounts of fat  These foods are high in calories and low in healthy nutrients  Read food labels so you know how much sugar and fat is in the food you want to eat  · Drink 8 to 10 glasses of water per day  Water will help you make plenty of milk for your baby  It will also help prevent constipation  Drink a glass of water every time you breastfeed your baby  · Take vitamins as directed  Ask your healthcare provider what vitamins you need  · Limit caffeine and alcohol if you are breastfeeding  Caffeine and alcohol can get into your breast milk  Caffeine and alcohol can make your baby fussy  They can also interfere with your baby's sleep  Ask your healthcare provider if you can drink alcohol or caffeine  Rest and sleep: You may feel very tired in the postpartum period   Enough sleep will help you heal and give you energy to care for your baby  The following may help you get sleep and rest:  · Nap when your baby naps  Your baby may nap several times during the day  Get rest during this time  · Limit visitors  Many people may want to see you and your baby  Ask friends or family to visit on different days  This will give you time to rest     · Do not plan too much for one day  Put off household chores so that you have time to rest  Gradually do more each day  · Ask for help from family, friends, or neighbors  Ask them to help you with laundry, cleaning, or errands  Also ask someone to watch the baby while you take a nap or relax  Ask your partner to help with the care of your baby  Pump some of your breast milk so your partner can feed your baby during the night  Exercise after delivery:  Wait until your healthcare provider says it is okay to exercise  Exercise can help you lose weight, increase your energy levels, and manage your mood  It can also prevent constipation and blood clots  Start with gentle exercises such as walking  Do more as you have more energy  You may need to avoid abdominal exercises for 1 to 2 weeks after you deliver  Talk to your healthcare provider about an exercise plan that is right for you  Sexual activity after delivery:   · Do not have sex until your healthcare provider says it is okay  You may need to wait 4 to 6 weeks before you have sex  This may prevent infection and allow time to heal     · Your menstrual cycle may begin as soon as 3 weeks after you deliver  Your period may be delayed if you breastfeed your baby  You can become pregnant before you get your first postpartum period  Talk to your healthcare provider about birth control that is right for you  Some types of birth control are not safe during breastfeeding  For support and more information:  Join a support group for new mothers   Ask for help from family and friends with chores, errands, and care of your baby  · Office of Women's Health,  Department of Health and Human Services  [address], [address] [address]  [place] , [address]  [address], [address] [address]  [place] , [address]  Phone: 1- 034 - [phone]  Web Address: www womenshealth gov  · [facility] [address] [place] [address] , [address] [place] [address]  [address] [facility] , [address] [place] [address]  Web Address: [employer]  org/pregnancy/postpartum-care  aspx  Follow up with your doctor or obstetrician as directed: You will need to follow up within 2 to 6 weeks of delivery  Write down your questions so you remember to ask them at your visits  © Copyright [address] Information is for End User's use only and may not be sold, redistributed or otherwise used for commercial purposes  All illustrations and images included in CareNotes® are the copyrighted property of [employer]  or [facility] Fay Jeong   The above information is an  only  It is not intended as medical advice for individual conditions or treatments  Talk to your doctor, nurse or pharmacist before following any medical regimen to see if it is safe and effective for you

## 2021-03-11 NOTE — PROGRESS NOTES
Pt oob with assist of one  Pt ambulated to bathroom  Pericare taught and performed  Pt able to void 200ml  Pt showered and tolerated activity well

## 2021-03-11 NOTE — ANESTHESIA POSTPROCEDURE EVALUATION
Post-Op Assessment Note    CV Status:  Stable    Pain management: adequate     Mental Status:  Alert and awake   Hydration Status:  Euvolemic   PONV Controlled:  Controlled   Airway Patency:  Patent      Post Op Vitals Reviewed: Yes      Staff: Anesthesiologist     Post-op block assessment: catheter intact and no complications      No complications documented      /74 (03/11/21 0625)    Temp      Pulse 78 (03/11/21 0625)   Resp      SpO2

## 2021-03-11 NOTE — ANESTHESIA PREPROCEDURE EVALUATION
Procedure:  LABOR ANALGESIA    Relevant Problems   CARDIO   (+) Chronic hypertension affecting pregnancy      ENDO   (+) Hypothyroid      Other   (+) Obesity affecting pregnancy in third trimester        Physical Exam    Airway    Mallampati score: III  TM Distance: >3 FB  Neck ROM: full     Dental   No notable dental hx     Cardiovascular  Cardiovascular exam normal    Pulmonary  Pulmonary exam normal     Other Findings        Anesthesia Plan  ASA Score- 2     Anesthesia Type- epidural with ASA Monitors  Additional Monitors:   Airway Plan:           Plan Factors-    Chart reviewed  Existing labs reviewed  Patient summary reviewed  Induction-     Postoperative Plan-     Informed Consent- Anesthetic plan and risks discussed with patient

## 2021-03-11 NOTE — OB LABOR/OXYTOCIN SAFETY PROGRESS
Labor Progress Note - Valerie Guptaðrenay 39 y o  female MRN: [id number]    Unit/Bed#: L&D 325-01 Encounter: [id number]       Contraction Frequency (minutes): 6-7  Contraction Quality: Moderate, Strong  Tachysystole: No   Cervical Dilation: 8        Cervical Effacement: 90  Fetal Station: 0  Baseline Rate: 135 bpm     FHR Category: Category II  Oxytocin Safety Progress Check: Safety check completed            Vital Signs:   Vitals:    03/11/21 0500   BP: 128/58   Pulse: 83   Resp:    Temp:            Notes/comments:   Pt comfortable with epidural now  SVE 8/90/0  FHT notable for intermittent late and early decelerations  Patient repositioned, IV fluids running     Continue expectant management     Marijean Lesches, MD 3/11/2021 5:19 AM

## 2021-03-11 NOTE — TELEPHONE ENCOUNTER
[facility] 10[birth year]95 38 WEEKS PREGNANT AND CONTRACTIONS 8 MINS APART AND AN INCREASE IN MOVEMENT   BURNING SENSATION IN TAILBONE

## 2021-03-11 NOTE — DISCHARGE SUMMARY
Discharge Summary - OB/GYN   Tomer Valdovinos 22 y o  female MRN: [id number]  Unit/Bed#: &D 325-01 Encounter: [id number]      Admission Date: 3/10/2021     Discharge Date: 3/12/21    Admitting Diagnosis:   1  Pregnancy at 38w4d  2  Active labor  3  SROM  4  cHTN  5  Hypothyroidism  6  A1GDM    Discharge Diagnosis:   Same, delivered    Procedures: spontaneous vaginal delivery    Attending: Sharmin Roberto MD    Hospital Course:     Tomer Valdovinos is a 22 y o   female who was initially admitted to L&D on 3/10/2021 for active labor  Her labor course was notable for epidural for pain management  SROM occurred, notable for clear fluid  She progressed to complete at 0535, and started pushing at 0556  She pushed for 2 min  She delivered a viable female  on 3/11/2021 at 0477 11 28 98 via spontaneous vaginal delivery  Patient tolerated the procedure well  Apgars were 8 (1 min) and 9 (5 min)  Weight was 6lbs 4oz   was transferred to  nursery  Her post-partum course was uncomplicated  Postpartum she had CMP notable for mild elevation in LFT  These resolved by this morning and did not progress  Protein/creatinine ratio does not suggest preeclampsia  Her BP has been normal for last 24hrs  We discussed this finding and preeclampsia precautions  She otherwise feels well and would very much like to go home today She was spontaneously voiding after delivery  She was breastfeeding  She was discharged on PPD#1, by which point her pain was controlled and she was tolerating PO  She had appropriate bowel function  She was discharged with standard post partum instructions to follow up with her physician in 3 weeks  Complications: none apparent    Condition at discharge: good     Discharge instructions/Information to patient and family:   See after visit summary for information provided to patient and family        Provisions for Follow-Up Care:  See after visit summary for information related to follow-up care and any pertinent home health orders  Disposition: See After Visit Summary for discharge disposition information  Planned Readmission: No    Discharge instructions/Information to patient and family:   - Do not place anything (no partner, tampons or douche) in your vagina for 6 weeks  -You may gradually return to your usual activities as tolerated  Avoid heavy lifting for the first few weeks    -Please do not drive for 1 week if you have no stitches and for 2 weeks if you have stitches or underwent a  delivery     -You may take baths or shower per your preference  -Examine your breasts in the mirror daily and call for redness or tenderness or increased warmth    -Please call us for temperature > 100 4*F or 38* C, worsening pain or a foul discharge  Discharge Medications:   Prenatal vitamin daily for 6 months or the duration of nursing whichever is longer  Motrin 600 mg orally every 6 hours as needed for pain  Tylenol (over the counter) per bottle directions as needed for pain: do NOT use with percocet  Hydrocortisone cream 1% (over the counter) applied 1-2x daily to hemorrhoids as needed    Provisions for Follow-Up Care: Follow up with your doctor in 1 weeks for blood pressure check  Follow-up in 3 weeks for PP visit     Gabriel Frances , [facility] Gynecology PGY-1

## 2021-03-11 NOTE — PLAN OF CARE
Problem: Knowledge Deficit  Goal: Verbalizes understanding of labor plan  Description: Assess patient/family/caregiver's baseline knowledge level and ability to understand information  Provide education via patient/family/caregiver's preferred learning method at appropriate level of understanding  1  Provide teaching at level of understanding  2  Provide teaching via preferred learning method(s)  3/11/2021 0643 by Harriet Virk RN  Outcome: Completed  3/11/2021 0439 by Harriet Vrik RN  Outcome: Progressing     Problem: Labor & Delivery  Goal: Manages discomfort  Description: Assess and monitor for signs and symptoms of discomfort  Assess patient's pain level regularly and per hospital policy  Administer medications as ordered  Support use of nonpharmacological methods to help control pain such as distraction, imagery, relaxation, and application of heat and cold  Collaborate with interdisciplinary team and patient to determine appropriate pain management plan  1  Include patient in decisions related to comfort  2  Offer non-pharmacological pain management interventions  3  Report ineffective pain management to physician  3/11/2021 0643 by Harriet Virk RN  Outcome: Completed  3/11/2021 0439 by Harriet Virk RN  Outcome: Progressing  Goal: Patient vital signs are stable  Description: 1  Assess vital signs - vaginal delivery    3/11/2021 0643 by Harriet Virk RN  Outcome: Completed  3/11/2021 0439 by Harriet Virk RN  Outcome: Progressing

## 2021-03-11 NOTE — L&D DELIVERY NOTE
DELIVERY NOTE  Rikki Jung 22 y o  female MRN: [id number]  Unit/Bed#: L&D 325-01 Encounter: [id number]    Obstetrician:    Dr Jackie Parks MD    Assistant:   Dr Danielle Rogel    Pre-Delivery Diagnosis:   IUP @ 38w4d   cHTN  Hypothyroidism   A1GDM  Active labor   SROM    Post-Delivery Diagnosis:   Same, now delivered     Procedure:  Spontaneous vaginal delivery  Repair of labial laceration    Anesthesia:  epidural    Specimens:  Placenta; small appearing, central insertion, intact  Cord blood obtained  Arterial and venous blood gases (below)     Gases:  Umbilical Cord Venous Blood Gas:  Results from last 7 days   Lab Units 21  0600   PH COV  7 340   PCO2 COV mm HG 44 0*   HCO3 COV mmol/L 23 2   BASE EXC COV mmol/L -2 7*   O2 CT CD VB mL/dL 13 0   O2 HGB, VENOUS CORD % 68 6     Umbilical Cord Arterial Blood Gas:        Quantitative Blood Loss:   121 mL           Complications:    None     Outcomes:  APGARS: 8 (1 min) and 9 (5 min)  Weight: pending    Description of Labor Course and Delivery:  Rikki Jung is a 22 y o   female at 38w3d who was admitted to L&D on 3/10/2021 for active labor  Her labor course was notable for epidural for pain management  SROM occurred, notable for clear fluid  She progressed to complete at 0535, and started pushing at 0556  She pushed for 2 min  With maternal pushing efforts, the fetal vertex delivered spontaneously  A nuchal cord was not noted  The anterior left shoulder was delivered atraumatically with gentle downward traction  The contralateral arm was delivered with gentle upward traction  The remainder of the fetus delivered spontaneously at 0477 11 28 98, resulting in a viable female   Upon delivery, the infant was placed on the mothers abdomen and the cord was doubly clamped and cut after at least 30 seconds  The  was noted to have good tone and cry spontaneously  There was no evidence of injury    The  was passed off to  staff for evaluation  Umbilical cord blood and umbilical artery and venous gases were collected and sent to the lab  Active management of the third stage of labor was undertaken with administration of IV pitocin at 250milliunits/min, gentle cord traction, and fundal massage  An intact placenta was delivered spontaneously at 0606  It was noted to have a centrally-inserted 3-vessel cord  It was sent to storage  Inspection of the perineum, vagina, labia, cervix, and urethra revealed small labial abrasions and a midline periurethral hemostatic laceration  These were repaired in interrupted fashion with 3-0 Vicryl rapid on SH needle  Bleeding was noted to be under control  A bimanual exam was performed and residual clots were expressed from the uterus  The uterus was noted to remain firm  At the conclusion of the delivery, all needle, sponge, and instrument counts were noted to be correct  Patient tolerated the procedure well and was allowed to recover in labor and delivery room with family and  before being transferred to the post-partum floor  Dr Harjeet Solorio MD was present for the entire procedure      Marijean Lesches, MD  PGY-2 OB/GYN   3/11/2021 7:22 AM

## 2021-03-11 NOTE — PLAN OF CARE
Problem: Knowledge Deficit  Goal: Patient/family/caregiver demonstrates understanding of disease process, treatment plan, medications, and discharge instructions  Description: Complete learning assessment and assess knowledge base  Interventions:  - Provide teaching at level of understanding  - Provide teaching via preferred learning methods  Outcome: Progressing     Problem: Labor & Delivery  Goal: Manages discomfort  Description: Assess and monitor for signs and symptoms of discomfort  Assess patient's pain level regularly and per hospital policy  Administer medications as ordered  Support use of nonpharmacological methods to help control pain such as distraction, imagery, relaxation, and application of heat and cold  Collaborate with interdisciplinary team and patient to determine appropriate pain management plan  1  Include patient in decisions related to comfort  2  Offer non-pharmacological pain management interventions  3  Report ineffective pain management to physician  Outcome: Progressing  Goal: Patient vital signs are stable  Description: 1  Assess vital signs - vaginal delivery    Outcome: Progressing     Problem: POSTPARTUM  Goal: Experiences normal postpartum course  Description: INTERVENTIONS:  - Monitor maternal vital signs  - Assess uterine involution and lochia  Outcome: Progressing  Goal: Appropriate maternal -  bonding  Description: INTERVENTIONS:  - Identify family support  - Assess for appropriate maternal/infant bonding   -Encourage maternal/infant bonding opportunities  - Referral to  or  as needed  Outcome: Progressing  Goal: Establishment of infant feeding pattern  Description: INTERVENTIONS:  - Assess breast/bottle feeding  - Refer to lactation as needed  Outcome: Progressing  Goal: Incision(s), wounds(s) or drain site(s) healing without S/S of infection  Description: INTERVENTIONS  - Assess and document risk factors for skin impairment   - Assess and document dressing, incision, wound bed, drain sites and surrounding tissue  - Consider nutrition services referral as needed  - Oral mucous membranes remain intact  - Provide patient/ family education  Outcome: Progressing     Problem: PAIN - ADULT  Goal: Verbalizes/displays adequate comfort level or baseline comfort level  Description: Interventions:  - Encourage patient to monitor pain and request assistance  - Assess pain using appropriate pain scale  - Administer analgesics based on type and severity of pain and evaluate response  - Implement non-pharmacological measures as appropriate and evaluate response  - Consider cultural and social influences on pain and pain management  - Notify physician/advanced practitioner if interventions unsuccessful or patient reports new pain  Outcome: Progressing     Problem: DISCHARGE PLANNING  Goal: Discharge to home or other facility with appropriate resources  Description: INTERVENTIONS:  - Identify barriers to discharge w/patient and caregiver  - Arrange for needed discharge resources and transportation as appropriate  - Identify discharge learning needs (meds, wound care, etc )  - Arrange for interpretive services to assist at discharge as needed  - Refer to Case Management Department for coordinating discharge planning if the patient needs post-hospital services based on physician/advanced practitioner order or complex needs related to functional status, cognitive ability, or social support system  Outcome: Progressing     Problem: Knowledge Deficit  Goal: Verbalizes understanding of labor plan  Description: Assess patient/family/caregiver's baseline knowledge level and ability to understand information  Provide education via patient/family/caregiver's preferred learning method at appropriate level of understanding  1  Provide teaching at level of understanding  2  Provide teaching via preferred learning method(s)    Outcome: Completed

## 2021-03-11 NOTE — LACTATION NOTE
This note was copied from a baby's chart  CONSULT - LACTATION  Baby Girl Radha Dial) Oncheck 0 days female MRN: [de-identified]    [facility] [facility] NURSERY Room / Bed: L&D 325(N)/L&D 325(N) Encounter: [id number]    Maternal Information     MOTHER:  Rito Moore  Maternal Age: 22 y o    OB History: # 1 - Date: 16, Sex: Male, Weight: 3147 g (6 lb 15 oz), GA: 37w6d, Delivery: Vaginal, Spontaneous, Apgar1: 9, Apgar5: 9, Living: Living, Birth Comments: None    # 2 - Date: 21, Sex: Female, Weight: 2860 g (6 lb 4 9 oz), GA: 38w4d, Delivery: Vaginal, Spontaneous, Apgar1: 8, Apgar5: 9, Living: Living, Birth Comments: None   Previouse breast reduction surgery? No    Lactation history:   Has patient previously breast fed: Yes   How long had patient previously breast fed: 2 weeks   Previous breast feeding complications: Infant separation, Low milk supply     Past Surgical History:   Procedure Laterality Date    KNEE CARTILAGE SURGERY Right     WISDOM TOOTH EXTRACTION      WRIST SURGERY Left         Birth information:  YOB: 2021   Time of birth: 5:58 AM   Sex: female   Delivery type: Vaginal, Spontaneous   Birth Weight: 2860 g (6 lb 4 9 oz)   Percent of Weight Change: 0%     Gestational Age: 38w3d   [unfilled]    Assessment     Breast and nipple assessment: normal assessment    [place] Assessment: normal assessment    Feeding assessment: feeding well  LATCH:  Latch: Grasps breast, tongue down, lips flanged, rhythmic sucking   Audible Swallowing: Spontaneous and intermittent (24 hours old)   Type of Nipple: Everted (After stimulation)   Comfort (Breast/Nipple): Soft/non-tender   Hold (Positioning): Partial assist, teach one side, mother does other, staff holds   LATCH Score: 9          Feeding recommendations:  breast feed on demand     Hand expression effective for small drops  Charlie latches well to the breast with support/assistance      Discussed 2nd night syndrome and ways to calm infant  Hand out given  Information on hand expression given  Discussed benefits of knowing how to manually express breast including stimulating milk supply, softening nipple for latch and evacuating breast in the event of engorgement  Met with mother  Provided mother with Ready, Set, Baby booklet  Discussed Skin to Skin contact an benefits to mom and baby  Talked about the delay of the first bath until baby has adjusted  Spoke about the benefits of rooming in  Feeding on cue and what that means for recognizing infant's hunger  Avoidance of pacifiers for the first month discussed  Talked about exclusive breastfeeding for the first 6 months  Positioning and latch reviewed as well as showing images of other feeding positions  Discussed the properties of a good latch in any position  Reviewed hand/manual expression  Discussed s/s that baby is getting enough milk and some s/s that breastfeeding dyad may need further help  Gave information on common concerns, what to expect the first few weeks after delivery, preparing for other caregivers, and how partners can help  Resources for support also provided  Worked on positioning infant up at chest level and starting to feed infant with nose arriving at the nipple  Then, using areolar compression to achieve a deep latch that is comfortable and exchanges optimum amounts of milk  Encouraged parents to call for assistance, questions, and concerns about breastfeeding  Extension provided       Sana Grimm RN 3/11/2021 9:14 AM

## 2021-03-11 NOTE — ANESTHESIA PROCEDURE NOTES
Epidural Block    Patient location during procedure: OB  Start time: 3/11/2021 4:26 AM  Reason for block: at surgeon's request and primary anesthetic  Staffing  Anesthesiologist: Tara Kothari, DO  Performed: anesthesiologist   Preanesthetic Checklist  Completed: patient identified, surgical consent, pre-op evaluation, timeout performed, IV checked, risks and benefits discussed and monitors and equipment checked  Epidural  Patient position: sitting  Prep: Betadine  Patient monitoring: frequent blood pressure checks  Approach: midline  Location: lumbar (1-5)  Injection technique: LULA saline  Needle  Needle type: Tuohy   Needle gauge: 18 G  Catheter type: side hole  Catheter size: 20 G  Catheter at skin depth: 11 cm  Test dose: negativelidocaine 1 5% with epinephrine 1:200,000 test dose, 3 mLnegative aspiration for CSF, negative aspiration for heme and no paresthesia on injection  patient tolerated the procedure well with no immediate complications

## 2021-03-11 NOTE — QUICK NOTE
Review patient's BP's in labor and after delivery  There were some BP's noted with SBP in the 140s  Pt has cHTN diagnosed with some elevated BP's during prenatal care  She is asymptomatic  Recommend collecting PreE labs to rule out preeclampsia given intermittent elevated BP's  Urine should be obtained via straight catheter to avoid false elevation of protein from vaginal bleeding  Patient aware and agreeable     D/w Dr Azalia Fuentes

## 2021-03-12 VITALS
RESPIRATION RATE: 18 BRPM | HEART RATE: 76 BPM | HEIGHT: 64 IN | SYSTOLIC BLOOD PRESSURE: 146 MMHG | TEMPERATURE: 97.9 F | BODY MASS INDEX: 39.27 KG/M2 | WEIGHT: 230 LBS | DIASTOLIC BLOOD PRESSURE: 80 MMHG | OXYGEN SATURATION: 98 %

## 2021-03-12 LAB
ALBUMIN SERPL BCP-MCNC: 2.1 G/DL (ref 3.5–5)
ALP SERPL-CCNC: 93 U/L (ref 46–116)
ALT SERPL W P-5'-P-CCNC: 76 U/L (ref 12–78)
ANION GAP SERPL CALCULATED.3IONS-SCNC: 8 MMOL/L (ref 4–13)
AST SERPL W P-5'-P-CCNC: 43 U/L (ref 5–45)
BILIRUB SERPL-MCNC: 0.11 MG/DL (ref 0.2–1)
BUN SERPL-MCNC: 10 MG/DL (ref 5–25)
CALCIUM ALBUM COR SERPL-MCNC: 9.7 MG/DL (ref 8.3–10.1)
CALCIUM SERPL-MCNC: 8.2 MG/DL (ref 8.3–10.1)
CHLORIDE SERPL-SCNC: 106 MMOL/L (ref 100–108)
CO2 SERPL-SCNC: 24 MMOL/L (ref 21–32)
CREAT SERPL-MCNC: 0.87 MG/DL (ref 0.6–1.3)
GFR SERPL CREATININE-BSD FRML MDRD: 93 ML/MIN/1.73SQ M
GLUCOSE SERPL-MCNC: 114 MG/DL (ref 65–140)
POTASSIUM SERPL-SCNC: 3.8 MMOL/L (ref 3.5–5.3)
PROT SERPL-MCNC: 5.8 G/DL (ref 6.4–8.2)
SODIUM SERPL-SCNC: 138 MMOL/L (ref 136–145)

## 2021-03-12 PROCEDURE — 80053 COMPREHEN METABOLIC PANEL: CPT | Performed by: OBSTETRICS & GYNECOLOGY

## 2021-03-12 PROCEDURE — 99024 POSTOP FOLLOW-UP VISIT: CPT | Performed by: OBSTETRICS & GYNECOLOGY

## 2021-03-12 RX ORDER — DIAPER,BRIEF,INFANT-TODD,DISP
1 EACH MISCELLANEOUS 4 TIMES DAILY PRN
Qty: 30 G | Refills: 0
Start: 2021-03-12 | End: 2021-03-18 | Stop reason: HOSPADM

## 2021-03-12 RX ORDER — IBUPROFEN 600 MG/1
600 TABLET ORAL EVERY 6 HOURS PRN
Qty: 50 TABLET | Refills: 1 | Status: SHIPPED | OUTPATIENT
Start: 2021-03-12 | End: 2021-03-18 | Stop reason: HOSPADM

## 2021-03-12 RX ADMIN — DOCUSATE SODIUM 100 MG: 100 CAPSULE, LIQUID FILLED ORAL at 08:48

## 2021-03-12 RX ADMIN — IBUPROFEN 600 MG: 600 TABLET ORAL at 07:48

## 2021-03-12 RX ADMIN — LEVOTHYROXINE SODIUM 88 MCG: 88 TABLET ORAL at 05:05

## 2021-03-12 RX ADMIN — IBUPROFEN 600 MG: 600 TABLET ORAL at 17:40

## 2021-03-12 RX ADMIN — DOCUSATE SODIUM 100 MG: 100 CAPSULE, LIQUID FILLED ORAL at 17:40

## 2021-03-12 RX ADMIN — PRENATAL VIT W/ FE FUMARATE-FA TAB 27-0.8 MG 1 TABLET: 27-0.8 TAB at 08:48

## 2021-03-12 NOTE — LACTATION NOTE
This note was copied from a baby's chart  Mom called in to help calm baby due to frequent feeds  Discussed 2nd night syndrome and ways to calm infant  Hand out reviewed  Encouraged to hand express and calm baby prior to latch  Encourage 7-10 rapid sucks then look for rocker suck and swallow  "Bug baby" to keep actively eating  Recommend then attempting burp and offering second breast     With mom's request,Worked on positioning infant up at chest level and starting to feed infant with nose arriving at the nipple  Then, using areolar compression to achieve a deep latch that is comfortable and exchanges optimum amounts of milk  Helped with deep latch and stimulate to suckle well till baby popped off  Burp attempted then placed at right breast using football hold also  Baby suckling well  No family support at bedside  Encoraged MOB  to call for assistance, questions and concerns  Extension number for inpatient lactation support provided

## 2021-03-12 NOTE — PLAN OF CARE
Problem: POSTPARTUM  Goal: Experiences normal postpartum course  Description: INTERVENTIONS:  - Monitor maternal vital signs  - Assess uterine involution and lochia  Outcome: Progressing  Goal: Appropriate maternal -  bonding  Description: INTERVENTIONS:  - Identify family support  - Assess for appropriate maternal/infant bonding   -Encourage maternal/infant bonding opportunities  - Referral to  or  as needed  Outcome: Progressing  Goal: Establishment of infant feeding pattern  Description: INTERVENTIONS:  - Assess breast/bottle feeding  - Refer to lactation as needed  Outcome: Progressing  Goal: Incision(s), wounds(s) or drain site(s) healing without S/S of infection  Description: INTERVENTIONS  - Assess and document risk factors for skin impairment   - Assess and document dressing, incision, wound bed, drain sites and surrounding tissue  - Consider nutrition services referral as needed  - Oral mucous membranes remain intact  - Provide patient/ family education  Outcome: Progressing     Problem: PAIN - ADULT  Goal: Verbalizes/displays adequate comfort level or baseline comfort level  Description: Interventions:  - Encourage patient to monitor pain and request assistance  - Assess pain using appropriate pain scale  - Administer analgesics based on type and severity of pain and evaluate response  - Implement non-pharmacological measures as appropriate and evaluate response  - Consider cultural and social influences on pain and pain management  - Notify physician/advanced practitioner if interventions unsuccessful or patient reports new pain  Outcome: Progressing     Problem: DISCHARGE PLANNING  Goal: Discharge to home or other facility with appropriate resources  Description: INTERVENTIONS:  - Identify barriers to discharge w/patient and caregiver  - Arrange for needed discharge resources and transportation as appropriate  - Identify discharge learning needs (meds, wound care, etc )  - Arrange for interpretive services to assist at discharge as needed  - Refer to Case Management Department for coordinating discharge planning if the patient needs post-hospital services based on physician/advanced practitioner order or complex needs related to functional status, cognitive ability, or social support system  Outcome: Progressing     Problem: INFECTION - ADULT  Goal: Absence or prevention of progression during hospitalization  Description: INTERVENTIONS:  - Assess and monitor for signs and symptoms of infection  - Monitor lab/diagnostic results  - Monitor all insertion sites, i e  indwelling lines, tubes, and drains  - Monitor endotracheal if appropriate and nasal secretions for changes in amount and color  - [place] appropriate cooling/warming therapies per order  - Administer medications as ordered  - Instruct and encourage patient and family to use good hand hygiene technique  - Identify and instruct in appropriate isolation precautions for identified infection/condition  Outcome: Progressing  Goal: Absence of fever/infection during neutropenic period  Description: INTERVENTIONS:  - Monitor WBC    Outcome: Progressing     Problem: SAFETY ADULT  Goal: Patient will remain free of falls  Description: INTERVENTIONS:  - Assess patient frequently for physical needs  -  Identify cognitive and physical deficits and behaviors that affect risk of falls    -  [place] fall precautions as indicated by assessment   - Educate patient/family on patient safety including physical limitations  - Instruct patient to call for assistance with activity based on assessment  - Modify environment to reduce risk of injury  - Consider OT/PT consult to assist with strengthening/mobility  Outcome: Progressing  Goal: Maintain or return to baseline ADL function  Description: INTERVENTIONS:  -  Assess patient's ability to carry out ADLs; assess patient's baseline for ADL function and identify physical deficits which impact ability to perform ADLs (bathing, care of mouth/teeth, toileting, grooming, dressing, etc )  - Assess/evaluate cause of self-care deficits   - Assess range of motion  - Assess patient's mobility; develop plan if impaired  - Assess patient's need for assistive devices and provide as appropriate  - Encourage maximum independence but intervene and supervise when necessary  - Involve family in performance of ADLs  - Assess for home care needs following discharge   - Consider OT consult to assist with ADL evaluation and planning for discharge  - Provide patient education as appropriate  Outcome: Progressing  Goal: Maintain or return mobility status to optimal level  Description: INTERVENTIONS:  - Assess patient's baseline mobility status (ambulation, transfers, stairs, etc )    - Identify cognitive and physical deficits and behaviors that affect mobility  - Identify mobility aids required to assist with transfers and/or ambulation (gait belt, sit-to-stand, lift, walker, cane, etc )  - [place] fall precautions as indicated by assessment  - Record patient progress and toleration of activity level on Mobility SBAR; progress patient to next Phase/Stage  - Instruct patient to call for assistance with activity based on assessment  - Consider rehabilitation consult to assist with strengthening/weightbearing, etc   Outcome: Progressing     Problem: Knowledge Deficit  Goal: Patient/family/caregiver demonstrates understanding of disease process, treatment plan, medications, and discharge instructions  Description: Complete learning assessment and assess knowledge base    Interventions:  - Provide teaching at level of understanding  - Provide teaching via preferred learning methods  Outcome: Progressing

## 2021-03-12 NOTE — NURSING NOTE
PP and  discharge education pamphlets, papers, and "Save Your Life" magnet provided and reviewed with pt and FOB  Both parents asked appropriate questions and verbalized understanding  No further questions at this time  Parents encouraged to contact staff with questions or concerns

## 2021-03-12 NOTE — LACTATION NOTE
This note was copied from a baby's chart  Met with mother to go over discharge breastfeeding booklet including the feeding log  Emphasized 8 or more (12) feedings in a 24 hour period, what to expect for the number of diapers per day of life and the progression of properties of the  stooling pattern  Information on hand expression given  Discussed benefits of knowing how to manually express breast including stimulating milk supply, softening nipple for latch and evacuating breast in the event of engorgement  With mom request, Worked on positioning infant up at chest level and starting to feed infant with nose arriving at the nipple  Then, using areolar compression to achieve a deep latch that is comfortable and exchanges optimum amounts of milk  Mom chose left breast and cross cradle positioning  Shallow latch noted  Repositioned and guided to deeper latch using mom's chosen positioning  Baby sucked well till popped off and relaxed ton  Baby burped  Baby placed at right breast using football hold  Mom latched baby with very little assistance  Mom noted better comfort and suckling by baby  Reviewed breastfeeding and your lifestyle, storage and preparation of breast milk, how to keep you breast pump clean, the employed breastfeeding mother and paced bottle feeding handouts  Booklet included Breastfeeding Resources for after discharge including access to the number for the [address]  No family support at bedside at this time  [facility] [facility]  to call for assistance, questions and concerns  Extension number for inpatient lactation support provided

## 2021-03-12 NOTE — PROGRESS NOTES
Progress Note - OB/GYN   Anselmo Perrin 22 y o  female MRN: [id number]  Unit/Bed#: L&D 314-01 Encounter: [id number]    Assessment:  PPD#1 s/p Spontaneous Vaginal Delivery, stable    Plan:    1) Postpartum care  - Encourage ambulation   - Encourage breastfeeding  - Continue current meds   2) cHTN   - Elevated Bps in labor    - Overnight: 120s-130s/70s   - P:c 0 13  3) Elevated LFTs   - AST/ALT 61/104 --> 43/76   -  Prior CMP on 11/20/2020 was AST/ALT 14/11     - She is asymptomatic and feeling well pain-wise  - D/c tylenol to protect her liver  4) A1GDM   - follow-up for outpatient 2 hour glucose tolerance test postpartum  5) Hypothyroidism   - Continue Synthroid 88 mcg  6) Back pain   - Has ice pack, recommended gentle stretching and walking around   - Send to physical therapy outpatient if persistent  7) Diastasis recti   - Will continue to watch, can also sent to physical therapy outpatient if persistent  8) Disposition   - desires early discharge today if baby is cleared to go       Subjective/Objective     Subjective:     Pain: no  Tolerating PO: yes  Voiding: yes  Flatus: yes  BM: yes  Ambulating: yes  Breastfeeding: Breastfeeding  Chest pain: no  Shortness of breath: no  Leg pain: no  Lochia: minimal    Objective:     Vitals:  Vitals:    03/11/21 1056 03/11/21 1900 03/11/21 1930 03/11/21 2300   BP: 139/81 130/70  128/74   BP Location: Right arm Right arm  Right arm   Pulse: 80 70  61   Resp: 18 16  16   Temp: 98 6 °F (37 °C) (!) 97 4 °F (36 3 °C) 97 8 °F (36 6 °C) 97 6 °F (36 4 °C)   TempSrc: Temporal Temporal Oral Oral   Weight:       Height:           Physical Exam:   GEN: appears well, alert and oriented x 3, pleasant and cooperative   CV: +S1, +S2, no murmurs/rubs/gallops appreciated  RESP: no labored breathing  ABDOMEN: soft, no tenderness, no distention, Uterine fundus firm and non-tender, at the umbilicus   EXTREMITIES: non-tender  NEURO Alert and oriented to person, place, and time         Lab Results   Component Value Date    WBC 14 83 (H) 03/11/2021    HGB 12 3 03/11/2021    HCT 38 4 03/11/2021    MCV 95 03/11/2021     03/11/2021         Nimesh Rodrigues DO, PGY-1  Obstetrics & Gynecology  03/12/21

## 2021-03-12 NOTE — PLAN OF CARE
Problem: POSTPARTUM  Goal: Experiences normal postpartum course  Description: INTERVENTIONS:  - Monitor maternal vital signs  - Assess uterine involution and lochia  Outcome: Progressing  Goal: Appropriate maternal -  bonding  Description: INTERVENTIONS:  - Identify family support  - Assess for appropriate maternal/infant bonding   -Encourage maternal/infant bonding opportunities  - Referral to  or  as needed  Outcome: Progressing  Goal: Establishment of infant feeding pattern  Description: INTERVENTIONS:  - Assess breast/bottle feeding  - Refer to lactation as needed  Outcome: Progressing  Goal: Incision(s), wounds(s) or drain site(s) healing without S/S of infection  Description: INTERVENTIONS  - Assess and document risk factors for skin impairment   - Assess and document dressing, incision, wound bed, drain sites and surrounding tissue  - Consider nutrition services referral as needed  - Oral mucous membranes remain intact  - Provide patient/ family education  Outcome: Progressing     Problem: PAIN - ADULT  Goal: Verbalizes/displays adequate comfort level or baseline comfort level  Description: Interventions:  - Encourage patient to monitor pain and request assistance  - Assess pain using appropriate pain scale  - Administer analgesics based on type and severity of pain and evaluate response  - Implement non-pharmacological measures as appropriate and evaluate response  - Consider cultural and social influences on pain and pain management  - Notify physician/advanced practitioner if interventions unsuccessful or patient reports new pain  Outcome: Progressing     Problem: DISCHARGE PLANNING  Goal: Discharge to home or other facility with appropriate resources  Description: INTERVENTIONS:  - Identify barriers to discharge w/patient and caregiver  - Arrange for needed discharge resources and transportation as appropriate  - Identify discharge learning needs (meds, wound care, etc )  - Arrange for interpretive services to assist at discharge as needed  - Refer to Case Management Department for coordinating discharge planning if the patient needs post-hospital services based on physician/advanced practitioner order or complex needs related to functional status, cognitive ability, or social support system  Outcome: Progressing     Problem: INFECTION - ADULT  Goal: Absence or prevention of progression during hospitalization  Description: INTERVENTIONS:  - Assess and monitor for signs and symptoms of infection  - Monitor lab/diagnostic results  - Monitor all insertion sites, i e  indwelling lines, tubes, and drains  - Monitor endotracheal if appropriate and nasal secretions for changes in amount and color  - [place] appropriate cooling/warming therapies per order  - Administer medications as ordered  - Instruct and encourage patient and family to use good hand hygiene technique  - Identify and instruct in appropriate isolation precautions for identified infection/condition  Outcome: Progressing  Goal: Absence of fever/infection during neutropenic period  Description: INTERVENTIONS:  - Monitor WBC    Outcome: Progressing     Problem: SAFETY ADULT  Goal: Patient will remain free of falls  Description: INTERVENTIONS:  - Assess patient frequently for physical needs  -  Identify cognitive and physical deficits and behaviors that affect risk of falls    -  [place] fall precautions as indicated by assessment   - Educate patient/family on patient safety including physical limitations  - Instruct patient to call for assistance with activity based on assessment  - Modify environment to reduce risk of injury  - Consider OT/PT consult to assist with strengthening/mobility  Outcome: Progressing  Goal: Maintain or return to baseline ADL function  Description: INTERVENTIONS:  -  Assess patient's ability to carry out ADLs; assess patient's baseline for ADL function and identify physical deficits which impact ability to perform ADLs (bathing, care of mouth/teeth, toileting, grooming, dressing, etc )  - Assess/evaluate cause of self-care deficits   - Assess range of motion  - Assess patient's mobility; develop plan if impaired  - Assess patient's need for assistive devices and provide as appropriate  - Encourage maximum independence but intervene and supervise when necessary  - Involve family in performance of ADLs  - Assess for home care needs following discharge   - Consider OT consult to assist with ADL evaluation and planning for discharge  - Provide patient education as appropriate  Outcome: Progressing  Goal: Maintain or return mobility status to optimal level  Description: INTERVENTIONS:  - Assess patient's baseline mobility status (ambulation, transfers, stairs, etc )    - Identify cognitive and physical deficits and behaviors that affect mobility  - Identify mobility aids required to assist with transfers and/or ambulation (gait belt, sit-to-stand, lift, walker, cane, etc )  - [place] fall precautions as indicated by assessment  - Record patient progress and toleration of activity level on Mobility SBAR; progress patient to next Phase/Stage  - Instruct patient to call for assistance with activity based on assessment  - Consider rehabilitation consult to assist with strengthening/weightbearing, etc   Outcome: Progressing     Problem: Knowledge Deficit  Goal: Patient/family/caregiver demonstrates understanding of disease process, treatment plan, medications, and discharge instructions  Description: Complete learning assessment and assess knowledge base    Interventions:  - Provide teaching at level of understanding  - Provide teaching via preferred learning methods  Outcome: Progressing

## 2021-03-12 NOTE — PLAN OF CARE
Problem: POSTPARTUM  Goal: Experiences normal postpartum course  Description: INTERVENTIONS:  - Monitor maternal vital signs  - Assess uterine involution and lochia  Outcome: Adequate for Discharge  Goal: Appropriate maternal -  bonding  Description: INTERVENTIONS:  - Identify family support  - Assess for appropriate maternal/infant bonding   -Encourage maternal/infant bonding opportunities  - Referral to  or  as needed  Outcome: Adequate for Discharge  Goal: Establishment of infant feeding pattern  Description: INTERVENTIONS:  - Assess breast/bottle feeding  - Refer to lactation as needed  Outcome: Adequate for Discharge  Goal: Incision(s), wounds(s) or drain site(s) healing without S/S of infection  Description: INTERVENTIONS  - Assess and document risk factors for skin impairment   - Assess and document dressing, incision, wound bed, drain sites and surrounding tissue  - Consider nutrition services referral as needed  - Oral mucous membranes remain intact  - Provide patient/ family education  Outcome: Adequate for Discharge     Problem: PAIN - ADULT  Goal: Verbalizes/displays adequate comfort level or baseline comfort level  Description: Interventions:  - Encourage patient to monitor pain and request assistance  - Assess pain using appropriate pain scale  - Administer analgesics based on type and severity of pain and evaluate response  - Implement non-pharmacological measures as appropriate and evaluate response  - Consider cultural and social influences on pain and pain management  - Notify physician/advanced practitioner if interventions unsuccessful or patient reports new pain  Outcome: Adequate for Discharge     Problem: DISCHARGE PLANNING  Goal: Discharge to home or other facility with appropriate resources  Description: INTERVENTIONS:  - Identify barriers to discharge w/patient and caregiver  - Arrange for needed discharge resources and transportation as appropriate  - Identify discharge learning needs (meds, wound care, etc )  - Arrange for interpretive services to assist at discharge as needed  - Refer to Case Management Department for coordinating discharge planning if the patient needs post-hospital services based on physician/advanced practitioner order or complex needs related to functional status, cognitive ability, or social support system  Outcome: Adequate for Discharge     Problem: INFECTION - ADULT  Goal: Absence or prevention of progression during hospitalization  Description: INTERVENTIONS:  - Assess and monitor for signs and symptoms of infection  - Monitor lab/diagnostic results  - Monitor all insertion sites, i e  indwelling lines, tubes, and drains  - Monitor endotracheal if appropriate and nasal secretions for changes in amount and color  - [place] appropriate cooling/warming therapies per order  - Administer medications as ordered  - Instruct and encourage patient and family to use good hand hygiene technique  - Identify and instruct in appropriate isolation precautions for identified infection/condition  Outcome: Adequate for Discharge  Goal: Absence of fever/infection during neutropenic period  Description: INTERVENTIONS:  - Monitor WBC    Outcome: Adequate for Discharge     Problem: SAFETY ADULT  Goal: Patient will remain free of falls  Description: INTERVENTIONS:  - Assess patient frequently for physical needs  -  Identify cognitive and physical deficits and behaviors that affect risk of falls    -  [place] fall precautions as indicated by assessment   - Educate patient/family on patient safety including physical limitations  - Instruct patient to call for assistance with activity based on assessment  - Modify environment to reduce risk of injury  - Consider OT/PT consult to assist with strengthening/mobility  Outcome: Adequate for Discharge  Goal: Maintain or return to baseline ADL function  Description: INTERVENTIONS:  -  Assess patient's ability to carry out ADLs; assess patient's baseline for ADL function and identify physical deficits which impact ability to perform ADLs (bathing, care of mouth/teeth, toileting, grooming, dressing, etc )  - Assess/evaluate cause of self-care deficits   - Assess range of motion  - Assess patient's mobility; develop plan if impaired  - Assess patient's need for assistive devices and provide as appropriate  - Encourage maximum independence but intervene and supervise when necessary  - Involve family in performance of ADLs  - Assess for home care needs following discharge   - Consider OT consult to assist with ADL evaluation and planning for discharge  - Provide patient education as appropriate  Outcome: Adequate for Discharge  Goal: Maintain or return mobility status to optimal level  Description: INTERVENTIONS:  - Assess patient's baseline mobility status (ambulation, transfers, stairs, etc )    - Identify cognitive and physical deficits and behaviors that affect mobility  - Identify mobility aids required to assist with transfers and/or ambulation (gait belt, sit-to-stand, lift, walker, cane, etc )  - [place] fall precautions as indicated by assessment  - Record patient progress and toleration of activity level on Mobility SBAR; progress patient to next Phase/Stage  - Instruct patient to call for assistance with activity based on assessment  - Consider rehabilitation consult to assist with strengthening/weightbearing, etc   Outcome: Adequate for Discharge     Problem: Knowledge Deficit  Goal: Patient/family/caregiver demonstrates understanding of disease process, treatment plan, medications, and discharge instructions  Description: Complete learning assessment and assess knowledge base    Interventions:  - Provide teaching at level of understanding  - Provide teaching via preferred learning methods  Outcome: Adequate for Discharge

## 2021-03-16 ENCOUNTER — APPOINTMENT (EMERGENCY)
Dept: RADIOLOGY | Facility: HOSPITAL | Age: 26
DRG: 776 | End: 2021-03-16
Payer: COMMERCIAL

## 2021-03-16 ENCOUNTER — TELEPHONE (OUTPATIENT)
Dept: OBGYN CLINIC | Facility: MEDICAL CENTER | Age: 26
End: 2021-03-16

## 2021-03-16 ENCOUNTER — HOSPITAL ENCOUNTER (INPATIENT)
Facility: HOSPITAL | Age: 26
LOS: 1 days | Discharge: HOME | DRG: 776 | End: 2021-03-18
Attending: EMERGENCY MEDICINE | Admitting: INTERNAL MEDICINE
Payer: COMMERCIAL

## 2021-03-16 ENCOUNTER — CLINICAL SUPPORT (OUTPATIENT)
Dept: OBGYN CLINIC | Facility: MEDICAL CENTER | Age: 26
End: 2021-03-16

## 2021-03-16 DIAGNOSIS — N39.0 UTI (URINARY TRACT INFECTION): ICD-10-CM

## 2021-03-16 DIAGNOSIS — R10.9 ABDOMINAL PAIN: ICD-10-CM

## 2021-03-16 DIAGNOSIS — R00.2 PALPITATIONS: ICD-10-CM

## 2021-03-16 DIAGNOSIS — R00.1 BRADYCARDIA: ICD-10-CM

## 2021-03-16 DIAGNOSIS — R07.9 CHEST PAIN: Primary | ICD-10-CM

## 2021-03-16 DIAGNOSIS — N61.0 MASTITIS: ICD-10-CM

## 2021-03-16 PROBLEM — O24.419 GESTATIONAL DIABETES: Status: ACTIVE | Noted: 2021-03-16

## 2021-03-16 LAB
ALBUMIN SERPL BCP-MCNC: 2.6 G/DL (ref 3.5–5)
ALP SERPL-CCNC: 99 U/L (ref 46–116)
ALT SERPL W P-5'-P-CCNC: 64 U/L (ref 12–78)
ANION GAP SERPL CALCULATED.3IONS-SCNC: 10 MMOL/L (ref 4–13)
APTT PPP: 26 SECONDS (ref 23–37)
AST SERPL W P-5'-P-CCNC: 35 U/L (ref 5–45)
BACTERIA UR QL AUTO: ABNORMAL /HPF
BASOPHILS # BLD AUTO: 0.04 THOUSANDS/ΜL (ref 0–0.1)
BASOPHILS NFR BLD AUTO: 0 % (ref 0–1)
BILIRUB SERPL-MCNC: 0.18 MG/DL (ref 0.2–1)
BILIRUB UR QL STRIP: NEGATIVE
BUN SERPL-MCNC: 10 MG/DL (ref 5–25)
CALCIUM ALBUM COR SERPL-MCNC: 10.5 MG/DL (ref 8.3–10.1)
CALCIUM SERPL-MCNC: 9.4 MG/DL (ref 8.3–10.1)
CHLORIDE SERPL-SCNC: 107 MMOL/L (ref 100–108)
CLARITY UR: ABNORMAL
CO2 SERPL-SCNC: 27 MMOL/L (ref 21–32)
COLOR UR: ABNORMAL
CREAT SERPL-MCNC: 0.82 MG/DL (ref 0.6–1.3)
EOSINOPHIL # BLD AUTO: 0.21 THOUSAND/ΜL (ref 0–0.61)
EOSINOPHIL NFR BLD AUTO: 2 % (ref 0–6)
ERYTHROCYTE [DISTWIDTH] IN BLOOD BY AUTOMATED COUNT: 13.2 % (ref 11.6–15.1)
GFR SERPL CREATININE-BSD FRML MDRD: 100 ML/MIN/1.73SQ M
GLUCOSE SERPL-MCNC: 85 MG/DL (ref 65–140)
GLUCOSE SERPL-MCNC: 95 MG/DL (ref 65–140)
GLUCOSE UR STRIP-MCNC: NEGATIVE MG/DL
HCT VFR BLD AUTO: 38.2 % (ref 34.8–46.1)
HGB BLD-MCNC: 12 G/DL (ref 11.5–15.4)
HGB UR QL STRIP.AUTO: ABNORMAL
IMM GRANULOCYTES # BLD AUTO: 0.13 THOUSAND/UL (ref 0–0.2)
IMM GRANULOCYTES NFR BLD AUTO: 1 % (ref 0–2)
INR PPP: 1 (ref 0.84–1.19)
KETONES UR STRIP-MCNC: NEGATIVE MG/DL
LEUKOCYTE ESTERASE UR QL STRIP: ABNORMAL
LIPASE SERPL-CCNC: 76 U/L (ref 73–393)
LYMPHOCYTES # BLD AUTO: 0.84 THOUSANDS/ΜL (ref 0.6–4.47)
LYMPHOCYTES NFR BLD AUTO: 7 % (ref 14–44)
MCH RBC QN AUTO: 30.3 PG (ref 26.8–34.3)
MCHC RBC AUTO-ENTMCNC: 31.4 G/DL (ref 31.4–37.4)
MCV RBC AUTO: 97 FL (ref 82–98)
MONOCYTES # BLD AUTO: 0.55 THOUSAND/ΜL (ref 0.17–1.22)
MONOCYTES NFR BLD AUTO: 5 % (ref 4–12)
NEUTROPHILS # BLD AUTO: 9.78 THOUSANDS/ΜL (ref 1.85–7.62)
NEUTS SEG NFR BLD AUTO: 85 % (ref 43–75)
NITRITE UR QL STRIP: NEGATIVE
NON-SQ EPI CELLS URNS QL MICRO: ABNORMAL /HPF
NRBC BLD AUTO-RTO: 0 /100 WBCS
NT-PROBNP SERPL-MCNC: 703 PG/ML
PH UR STRIP.AUTO: 6.5 [PH]
PLATELET # BLD AUTO: 345 THOUSANDS/UL (ref 149–390)
PMV BLD AUTO: 9.7 FL (ref 8.9–12.7)
POTASSIUM SERPL-SCNC: 3.8 MMOL/L (ref 3.5–5.3)
PROT SERPL-MCNC: 6.8 G/DL (ref 6.4–8.2)
PROT UR STRIP-MCNC: NEGATIVE MG/DL
PROTHROMBIN TIME: 13 SECONDS (ref 11.6–14.5)
RBC # BLD AUTO: 3.96 MILLION/UL (ref 3.81–5.12)
RBC #/AREA URNS AUTO: ABNORMAL /HPF
SODIUM SERPL-SCNC: 144 MMOL/L (ref 136–145)
SP GR UR STRIP.AUTO: 1.01 (ref 1–1.03)
TROPONIN I SERPL-MCNC: <0.02 NG/ML
TSH SERPL DL<=0.05 MIU/L-ACNC: 2.75 UIU/ML (ref 0.36–3.74)
TSH SERPL DL<=0.05 MIU/L-ACNC: 2.75 UIU/ML (ref 0.36–3.74)
UROBILINOGEN UR QL STRIP.AUTO: 0.2 E.U./DL
WBC # BLD AUTO: 11.55 THOUSAND/UL (ref 4.31–10.16)
WBC #/AREA URNS AUTO: ABNORMAL /HPF

## 2021-03-16 PROCEDURE — 85610 PROTHROMBIN TIME: CPT | Performed by: EMERGENCY MEDICINE

## 2021-03-16 PROCEDURE — 80053 COMPREHEN METABOLIC PANEL: CPT | Performed by: EMERGENCY MEDICINE

## 2021-03-16 PROCEDURE — 81001 URINALYSIS AUTO W/SCOPE: CPT | Performed by: EMERGENCY MEDICINE

## 2021-03-16 PROCEDURE — 84443 ASSAY THYROID STIM HORMONE: CPT | Performed by: EMERGENCY MEDICINE

## 2021-03-16 PROCEDURE — 87086 URINE CULTURE/COLONY COUNT: CPT | Performed by: EMERGENCY MEDICINE

## 2021-03-16 PROCEDURE — 85730 THROMBOPLASTIN TIME PARTIAL: CPT | Performed by: EMERGENCY MEDICINE

## 2021-03-16 PROCEDURE — 96374 THER/PROPH/DIAG INJ IV PUSH: CPT

## 2021-03-16 PROCEDURE — 93005 ELECTROCARDIOGRAM TRACING: CPT

## 2021-03-16 PROCEDURE — 36415 COLL VENOUS BLD VENIPUNCTURE: CPT | Performed by: EMERGENCY MEDICINE

## 2021-03-16 PROCEDURE — 82948 REAGENT STRIP/BLOOD GLUCOSE: CPT

## 2021-03-16 PROCEDURE — 83880 ASSAY OF NATRIURETIC PEPTIDE: CPT | Performed by: EMERGENCY MEDICINE

## 2021-03-16 PROCEDURE — 99285 EMERGENCY DEPT VISIT HI MDM: CPT

## 2021-03-16 PROCEDURE — 99244 OFF/OP CNSLTJ NEW/EST MOD 40: CPT | Performed by: STUDENT IN AN ORGANIZED HEALTH CARE EDUCATION/TRAINING PROGRAM

## 2021-03-16 PROCEDURE — 83690 ASSAY OF LIPASE: CPT | Performed by: EMERGENCY MEDICINE

## 2021-03-16 PROCEDURE — 85025 COMPLETE CBC W/AUTO DIFF WBC: CPT | Performed by: EMERGENCY MEDICINE

## 2021-03-16 PROCEDURE — 96375 TX/PRO/DX INJ NEW DRUG ADDON: CPT

## 2021-03-16 PROCEDURE — 71045 X-RAY EXAM CHEST 1 VIEW: CPT

## 2021-03-16 PROCEDURE — 87040 BLOOD CULTURE FOR BACTERIA: CPT | Performed by: EMERGENCY MEDICINE

## 2021-03-16 PROCEDURE — 99285 EMERGENCY DEPT VISIT HI MDM: CPT | Performed by: EMERGENCY MEDICINE

## 2021-03-16 PROCEDURE — 84484 ASSAY OF TROPONIN QUANT: CPT | Performed by: EMERGENCY MEDICINE

## 2021-03-16 PROCEDURE — 86618 LYME DISEASE ANTIBODY: CPT | Performed by: INTERNAL MEDICINE

## 2021-03-16 PROCEDURE — 84484 ASSAY OF TROPONIN QUANT: CPT | Performed by: INTERNAL MEDICINE

## 2021-03-16 PROCEDURE — 84443 ASSAY THYROID STIM HORMONE: CPT | Performed by: INTERNAL MEDICINE

## 2021-03-16 PROCEDURE — 99220 PR INITIAL OBSERVATION CARE/DAY 70 MINUTES: CPT | Performed by: INTERNAL MEDICINE

## 2021-03-16 PROCEDURE — 99244 OFF/OP CNSLTJ NEW/EST MOD 40: CPT | Performed by: INTERNAL MEDICINE

## 2021-03-16 RX ORDER — ACETAMINOPHEN 325 MG/1
650 TABLET ORAL ONCE
Status: DISCONTINUED | OUTPATIENT
Start: 2021-03-16 | End: 2021-03-16

## 2021-03-16 RX ORDER — PSEUDOEPHEDRINE HCL 60 MG/1
60 TABLET ORAL EVERY 8 HOURS SCHEDULED
Status: DISCONTINUED | OUTPATIENT
Start: 2021-03-16 | End: 2021-03-18 | Stop reason: HOSPADM

## 2021-03-16 RX ORDER — DICLOXACILLIN SODIUM 250 MG/1
500 CAPSULE ORAL EVERY 6 HOURS SCHEDULED
Status: DISCONTINUED | OUTPATIENT
Start: 2021-03-16 | End: 2021-03-18 | Stop reason: HOSPADM

## 2021-03-16 RX ORDER — LIDOCAINE HYDROCHLORIDE 20 MG/ML
15 SOLUTION OROPHARYNGEAL ONCE
Status: COMPLETED | OUTPATIENT
Start: 2021-03-16 | End: 2021-03-16

## 2021-03-16 RX ORDER — ACETAMINOPHEN 325 MG/1
650 TABLET ORAL EVERY 6 HOURS PRN
Status: DISCONTINUED | OUTPATIENT
Start: 2021-03-16 | End: 2021-03-17

## 2021-03-16 RX ORDER — ONDANSETRON 2 MG/ML
4 INJECTION INTRAMUSCULAR; INTRAVENOUS EVERY 6 HOURS PRN
Status: DISCONTINUED | OUTPATIENT
Start: 2021-03-16 | End: 2021-03-18 | Stop reason: HOSPADM

## 2021-03-16 RX ORDER — LEVOTHYROXINE SODIUM 88 UG/1
88 TABLET ORAL
Status: DISCONTINUED | OUTPATIENT
Start: 2021-03-17 | End: 2021-03-18 | Stop reason: HOSPADM

## 2021-03-16 RX ORDER — MAGNESIUM HYDROXIDE/ALUMINUM HYDROXICE/SIMETHICONE 120; 1200; 1200 MG/30ML; MG/30ML; MG/30ML
30 SUSPENSION ORAL ONCE
Status: COMPLETED | OUTPATIENT
Start: 2021-03-16 | End: 2021-03-16

## 2021-03-16 RX ORDER — SACCHAROMYCES BOULARDII 250 MG
250 CAPSULE ORAL 2 TIMES DAILY
Status: DISCONTINUED | OUTPATIENT
Start: 2021-03-16 | End: 2021-03-18 | Stop reason: HOSPADM

## 2021-03-16 RX ORDER — KETOROLAC TROMETHAMINE 30 MG/ML
15 INJECTION, SOLUTION INTRAMUSCULAR; INTRAVENOUS ONCE
Status: COMPLETED | OUTPATIENT
Start: 2021-03-16 | End: 2021-03-16

## 2021-03-16 RX ORDER — CALCIUM CARBONATE 200(500)MG
500 TABLET,CHEWABLE ORAL DAILY PRN
Status: DISCONTINUED | OUTPATIENT
Start: 2021-03-16 | End: 2021-03-18 | Stop reason: HOSPADM

## 2021-03-16 RX ORDER — DOCUSATE SODIUM 100 MG/1
100 CAPSULE, LIQUID FILLED ORAL 2 TIMES DAILY
Status: DISCONTINUED | OUTPATIENT
Start: 2021-03-16 | End: 2021-03-18 | Stop reason: HOSPADM

## 2021-03-16 RX ADMIN — Medication 250 MG: at 18:35

## 2021-03-16 RX ADMIN — FAMOTIDINE 20 MG: 10 INJECTION INTRAVENOUS at 15:13

## 2021-03-16 RX ADMIN — ENOXAPARIN SODIUM 40 MG: 40 INJECTION SUBCUTANEOUS at 18:40

## 2021-03-16 RX ADMIN — LIDOCAINE HYDROCHLORIDE 15 ML: 20 SOLUTION ORAL; TOPICAL at 15:14

## 2021-03-16 RX ADMIN — KETOROLAC TROMETHAMINE 15 MG: 30 INJECTION, SOLUTION INTRAMUSCULAR at 15:13

## 2021-03-16 RX ADMIN — ALUMINUM HYDROXIDE, MAGNESIUM HYDROXIDE, AND SIMETHICONE 30 ML: 200; 200; 20 SUSPENSION ORAL at 15:14

## 2021-03-16 RX ADMIN — CALCIUM CARBONATE (ANTACID) CHEW TAB 500 MG 500 MG: 500 CHEW TAB at 21:34

## 2021-03-16 RX ADMIN — CEFTRIAXONE SODIUM 1000 MG: 10 INJECTION, POWDER, FOR SOLUTION INTRAVENOUS at 17:06

## 2021-03-16 RX ADMIN — DICLOXACILLIN SODIUM 500 MG: 250 CAPSULE ORAL at 21:34

## 2021-03-16 RX ADMIN — DOCUSATE SODIUM 100 MG: 100 CAPSULE, LIQUID FILLED ORAL at 18:35

## 2021-03-16 RX ADMIN — PSEUDOEPHEDRINE HYDROCHLORIDE 60 MG: 60 TABLET ORAL at 21:35

## 2021-03-16 NOTE — CONSULTS
Consult - Cardiology   Liz [facility] 22 y o  female MRN: [id number]  Unit/Bed#: ED 09 Encounter: [id number]        Reason For Consult: Chest pain                ASSESSMENT:  1  Chest pain/ palpitations  2  Postpartum 5 days,  3/11/21  3  Gestational hypertension  4  Gestational diabetes  5  Asymptomatic sinus bradycardia  6  Acute urinary tract infection and mastitis, present on arrival  7  Strong family history of both coronary disease and CHF    PLAN/ DISCUSSION:     · Unclear etiology for her intermittent palpitations and chest discomfort  No evidence of tachyarrhythmia in the ED though she does report to me feeling some palpitations during her time in the hospital already  · Very low suspicion for ACS  Initial troponin is negative  Repeat EKG in AM and monitor tele overnight     · Given mildly elevated NT proBNP and the fact that she is 5 days postpartum now having palpitations will check echocardiogram    · By physical exam she does not look volume overloaded and chest x-ray is without cardiomegaly or pulmonary edema      History Of Present Illness: This is a pleasant 20-year-old female without any prior care from a cardiologist   She has no problems with her heart that she knows of nor has she ever had any testing performed on her heart  She does have a family history of heart disease in several relatives who have had MIs early in life and which she believes is some congestive heart failure though she is not sure of the details  She recently gave birth 5 days ago  This is her 2nd child  During her pregnancy she had gestational hypertension and diabetes  The hypertension was managed without medications  She also has hypothyroidism  She was feeling well after her discharge 5 days ago  Her postpartum course was uncomplicated  She felt well for about 3 days  Beginning approximately 2 days ago she began to have some intermittent abdominal discomfort  This had no obvious pattern    There were no clear aggravating or alleviating factors  With this she began to have some intermittent palpitations  These could last anywhere from several seconds to several minutes  They are occurring several times daily  They occur both at rest and with exertion  They also have no obvious pattern  When she has palpitations she gets a "strange feeling" in her chest which she does believe is uncomfortable  She tells me that when she is having palpitations and this discomfort in her chest she does check her blood pressure noting the systolic has been elevated up to 160  Her blood pressure is not consistently this high  Past Medical History:        Past Medical History:   Diagnosis Date    Chronic hypertension affecting pregnancy 11/25/2020    Disease of thyroid gland     Gestational diabetes mellitus (GDM) in third trimester 1/11/2021    Hypothyroid     Seasonal allergies     Urinary tract infection     Varicella       Past Surgical History:   Procedure Laterality Date    KNEE CARTILAGE SURGERY Right     WISDOM TOOTH EXTRACTION      WRIST SURGERY Left         Allergy:        Allergies   Allergen Reactions    Pollen Extract        Medications:       Prior to Admission medications    Medication Sig Start Date End Date Taking?  Authorizing Provider   ibuprofen (MOTRIN) 600 mg tablet Take 1 tablet (600 mg total) by mouth every 6 (six) hours as needed (cramping) 3/12/21  Yes Lary Godwin MD   levothyroxine 88 mcg tablet Take 88 mcg by mouth daily 7/9/20 7/9/21 Yes Historical Provider, MD   Accu-Chek Softclix Lancets lancets Use 4 a day or as instructed  Patient not taking: Reported on 3/16/2021 1/21/21   STEPHANIE Munoz   Blood Glucose Monitoring Suppl (Accu-Chek Guide) w/Device KIT Use 4 (four) times a day  Patient not taking: Reported on 3/16/2021 1/21/21   STEPHANIE Munoz   Blood Glucose Monitoring Suppl (OneTouch Verio Flex System) w/Device KIT Test 4 times per day fasting and 2 hours after the start of each meal  Patient not taking: Reported on 3/16/2021 1/21/21   STEPHANIE Mae   hydrocortisone 1 % cream Apply 1 application topically 4 (four) times a day as needed for irritation  Patient not taking: Reported on 3/16/2021 3/12/21   Tea Isidro MD   OneTouch Delica Lancets 54M MISC Test 4 times per day fasting and 2 hours after the start of each meal  Patient not taking: Reported on 3/16/2021 1/21/21   STEPHANIE Mae   Prenatal Vit-Fe Fumarate-FA (PRENATAL COMPLETE PO) Take 1 tablet by mouth daily      Historical Provider, MD   witch hazel-glycerin (TUCKS) topical pad Apply 1 pad topically every 4 (four) hours as needed for irritation or hemorrhoids  Patient not taking: Reported on 3/16/2021 3/12/21   Tea Isidro MD       Family History:     Family History   Problem Relation Age of Onset    No Known Problems Mother     Hyperthyroidism Father     Pancreatic cancer Maternal Grandmother     Heart attack Maternal Grandfather     No Known Problems Paternal Grandmother     Kidney failure Paternal Grandfather     Breast cancer Neg Hx     Colon cancer Neg Hx     Ovarian cancer Neg Hx         Social History:       Social History     Socioeconomic History    Marital status: /Civil Union     Spouse name: Not on file    Number of children: Not on file    Years of education: Not on file    Highest education level: Not on file   Occupational History    Not on file   Social Needs    Financial resource strain: Not on file    Food insecurity     Worry: Not on file     Inability: Not on file   [employer] needs     Medical: Not on file     Non-medical: Not on file   Tobacco Use    Smoking status: Former Smoker     Packs/day: 1 00     Years: 4 00     Pack years: 4 00     Types: Cigarettes     Quit date: 2016     Years since quittin 2    Smokeless tobacco: Never Used   Substance and Sexual Activity    Alcohol use: Not Currently     Comment: rarely prior to pregnancy    Drug use: Never    Sexual activity: Yes     Partners: Male   Lifestyle    Physical activity     Days per week: Not on file     Minutes per session: Not on file    Stress: Not on file   Relationships    Social connections     Talks on phone: Not on file     Gets together: Not on file     Attends Sabianist service: Not on file     Active member of club or organization: Not on file     Attends meetings of clubs or organizations: Not on file     Relationship status: Not on file    Intimate partner violence     Fear of current or ex partner: Not on file     Emotionally abused: Not on file     Physically abused: Not on file     Forced sexual activity: Not on file   Other Topics Concern    Not on file   Social History Narrative    Not on file       ROS:  Symptoms per HPI  Remainder review of systems is negative    Exam:  General:  alert, oriented and in no distress, cooperative  Head: Normocephalic, atraumatic  Eyes:  EOMI  Pupils - equal, round, reactive to accomodation  No icterus  Normal Conjunctiva  Oropharynx: moist and normal-appearing mucosa  Neck: supple, symmetrical, trachea midline and no JVD  Heart:  RRR, No: murmer, rub or gallop, S1 & S2 normal   Respiratory effort / Chest Inspection: unlabored  Lungs:  normal air entry, lungs clear to auscultation and no rales, rhonchi or wheezing   Abdomen: flat, normal findings: bowel sounds normal and soft, non-tender  Lower Limbs:  no pitting edema  Pulses[de-identified]  RLE - DP: present 2+                 LLE - DP: present 2+  Musculoskeletal: ROM grossly normal    DATA:      ECG:  Pending                   Telemetry: bradycardic  HR 50's         Weights: Wt Readings from Last 3 Encounters:   03/16/21 101 kg (223 lb 1 7 oz)   03/10/21 104 kg (230 lb)   03/10/21 104 kg (230 lb)   , Body mass index is 38 3 kg/m²           Lab Studies:    Results from last 7 days   Lab Units 03/16/21  1438   TROPONIN I ng/mL <0 02          Results from last 7 days   Lab Units 03/16/21  1438 03/11/21  0753 03/11/21  0322   WBC Thousand/uL 11 55* 14 83* 13 66*   HEMOGLOBIN g/dL 12 0 12 3 12 8   HEMATOCRIT % 38 2 38 4 38 9   PLATELETS Thousands/uL 345 323 353   ,   Results from last 7 days   Lab Units 03/16/21  1438 03/12/21  0510 03/11/21  0753   POTASSIUM mmol/L 3 8 3 8 4 2   CHLORIDE mmol/L 107 106 102   CO2 mmol/L 27 24 26   BUN mg/dL 10 10 12   CREATININE mg/dL 0 82 0 87 0 73   CALCIUM mg/dL 9 4 8 2* 9 0   ALK PHOS U/L 99 93 115   ALT U/L 64 76 104*   AST U/L 35 43 61*

## 2021-03-16 NOTE — H&P
[address] [facility]  H&P- Matt Snyder 1995, 22 y o  female MRN: [id number]  Unit/Bed#: ED 09 Encounter: [id number]  Primary Care Provider: Rogerio Cherry DO   Date and time admitted to hospital: 3/16/2021  2:02 PM    Gestational diabetes  Assessment & Plan  Lab Results   Component Value Date    HGBA1C 5 7 08/28/2017       Recent Labs     03/16/21  1442   POCGLU 85       Blood Sugar Average: Last 72 hrs:  (P) 85     History of gestational diabetes, this is an increased risk factor for diabetes as an older adult  Recommend outpatient weight loss    UTI (urinary tract infection)  Assessment & Plan  Start ceftriaxone, day 1  Of 3  Await culture results, likely can be discharged on Keflex    Mastitis  Assessment & Plan  If patient is breastfeeding she should continue with breastfeeding  Given concern for mastitis Will place OB Gyn consultation  Consider Keflex at discharge    * Bradycardia  Assessment & Plan  Asymptomatic- will place on telemetry for now  Check TSH and free T4  Obtain echocardiogram  Evidence of Mobitz type 1 block, reasonable for outpatient Holter monitor  Appreciate cardiology recommendation  Will order Lyme titer to rule out Lyme as potential etiology  Doubt ACS        VTE Prophylaxis: Early ambulation  / sequential compression device   Code Status:  Full code  POLST: There is no POLST form on file for this patient (pre-hospital)  Discussion with family:  Family member at bedside    Anticipated Length of Stay:  Patient will be admitted on an Observation basis with an anticipated length of stay of  less than 2 midnights  Justification for Hospital Stay:  Bradycardia    Total Time for Visit, including Counseling / Coordination of Care: 45 minutes  Greater than 50% of this total time spent on direct patient counseling and coordination of care  Chief Complaint:   Bradycardia, dysuria, mastitis    History of Present Illness:    Matt Snyder is a 22 y o  female who presents with bradycardia, urinary frequency and mastitis  The patient was recently postpartum 5 days from spontaneous vaginal delivery  She presented to the emergency room with a chief complaint of heart palpitations  She has no known cardiac history  She does have a family history of coronary artery disease with an uncle who passed away at age 25  She denies any chest pain  She does report having urinary frequency and dysuria  Also with breast tenderness  She denies any nausea or vomiting, no fevers or chills    Review of Systems:    A complete and comprehensive 14 point organ system review was performed and all other systems are negative other than stated above in the HPI    Past Medical and Surgical History:     Past Medical History:   Diagnosis Date    Chronic hypertension affecting pregnancy 11/25/2020    Disease of thyroid gland     Gestational diabetes mellitus (GDM) in third trimester 1/11/2021    Hypothyroid     Seasonal allergies     Urinary tract infection     Varicella        Past Surgical History:   Procedure Laterality Date    KNEE CARTILAGE SURGERY Right     WISDOM TOOTH EXTRACTION      WRIST SURGERY Left        Meds/Allergies:    Prior to Admission medications    Medication Sig Start Date End Date Taking?  Authorizing Provider   ibuprofen (MOTRIN) 600 mg tablet Take 1 tablet (600 mg total) by mouth every 6 (six) hours as needed (cramping) 3/12/21  Yes Diego Goldberg MD   levothyroxine 88 mcg tablet Take 88 mcg by mouth daily 7/9/20 7/9/21 Yes Historical Provider, MD   Accu-Chek Softclix Lancets lancets Use 4 a day or as instructed  Patient not taking: Reported on 3/16/2021 1/21/21   STEPHANIE Moreno   Blood Glucose Monitoring Suppl (Accu-Chek Guide) w/Device KIT Use 4 (four) times a day  Patient not taking: Reported on 3/16/2021 1/21/21   STEPHANIE Moreno   Blood Glucose Monitoring Suppl (OneTouch Verio Flex System) w/Device KIT Test 4 times per day fasting and 2 hours after the start of each meal  Patient not taking: Reported on 3/16/2021 1/21/21   STEPHANIE Delcid   hydrocortisone 1 % cream Apply 1 application topically 4 (four) times a day as needed for irritation  Patient not taking: Reported on 3/16/2021 3/12/21   Tyrel Mitchell MD   OneTouch Delica Lancets 62N MISC Test 4 times per day fasting and 2 hours after the start of each meal  Patient not taking: Reported on 3/16/2021 1/21/21   STEPHANIE Delcid   Prenatal Vit-Fe Fumarate-FA (PRENATAL COMPLETE PO) Take 1 tablet by mouth daily  Historical Provider, MD   witch hazel-glycerin (TUCKS) topical pad Apply 1 pad topically every 4 (four) hours as needed for irritation or hemorrhoids  Patient not taking: Reported on 3/16/2021 3/12/21   Tyrel Mitchell MD     I have reviewed home medications with patient personally  Allergies:    Allergies   Allergen Reactions    Pollen Extract        Social History:     Marital Status: /Civil Union   Occupation:  Stay at home mother    Substance Use History:   Social History     Substance and Sexual Activity   Alcohol Use Not Currently    Comment: rarely prior to pregnancy     Social History     Tobacco Use   Smoking Status Former Smoker    Packs/day: 1 00    Years: 4 00    Pack years: 4 00    Types: Cigarettes    Quit date: 2016    Years since quittin 2   Smokeless Tobacco Never Used     Social History     Substance and Sexual Activity   Drug Use Never       Family History:    Family History   Problem Relation Age of Onset    No Known Problems Mother     Hyperthyroidism Father     Pancreatic cancer Maternal Grandmother     Heart attack Maternal Grandfather     No Known Problems Paternal Grandmother     Kidney failure Paternal Grandfather     Breast cancer Neg Hx     Colon cancer Neg Hx     Ovarian cancer Neg Hx        Physical Exam:     Vitals:   Blood Pressure: 154/74 (21 1525)  Pulse: (!) 49 (21 1525)  Temperature: 98 8 °F (37 1 °C) (03/16/21 1406)  Temp Source: Oral (03/16/21 1406)  Respirations: 18 (03/16/21 1525)  Weight - Scale: 101 kg (223 lb 1 7 oz) (03/16/21 1406)  SpO2: 99 % (03/16/21 1525)      General: well appearing, no acute distress  HEENT: atraumatic, PERRLA, moist mucosa, normal pharynx, normal tonsils and adenoids, normal tongue, no fluid in sinuses  Neck: Trachea midline, no carotid bruit, no masses  Respiratory: normal chest wall expansion, CTA B, no r/r/w, no rubs  Cardiovascular:  Bradycardic no m/r/g, Normal S1 and S2  Abdomen: Soft, non-tender, non-distended, normal bowel sounds in all quadrants, no hepatosplenomegaly, no tympany  Rectal: deferred  Musculoskeletal: normal ROM in upper and lower extremities  Integumentary: warm, dry, and pink, with no rash, purpura, or petechia  Heme/Lymph: no lymphadenopathy, no bruises  Neurological: Cranial Nerves II-XII grossly intact  Psychiatric: cooperative with normal mood, affect, and cognition    Additional Data:     Lab Results: I have personally reviewed pertinent reports  Results from last 7 days   Lab Units 03/16/21  1438   WBC Thousand/uL 11 55*   HEMOGLOBIN g/dL 12 0   HEMATOCRIT % 38 2   PLATELETS Thousands/uL 345   NEUTROS PCT % 85*   LYMPHS PCT % 7*   MONOS PCT % 5   EOS PCT % 2     Results from last 7 days   Lab Units 03/16/21  1438   SODIUM mmol/L 144   POTASSIUM mmol/L 3 8   CHLORIDE mmol/L 107   CO2 mmol/L 27   BUN mg/dL 10   CREATININE mg/dL 0 82   ANION GAP mmol/L 10   CALCIUM mg/dL 9 4   ALBUMIN g/dL 2 6*   TOTAL BILIRUBIN mg/dL 0 18*   ALK PHOS U/L 99   ALT U/L 64   AST U/L 35   GLUCOSE RANDOM mg/dL 95     Results from last 7 days   Lab Units 03/16/21  1438   INR  1 00     Results from last 7 days   Lab Units 03/16/21  1442 03/11/21  0325 03/11/21  0048   POC GLUCOSE mg/dl 85 93 75               Imaging: I have personally reviewed pertinent reports        XR chest 1 view portable   Final Result by Enoch Hendricks MD (03/16 8215)      No acute cardiopulmonary disease  Workstation performed: [id number]             EKG, Pathology, and Other Studies Reviewed on Admission:   · EKG:  Reviewed with cardiology, will be it is type 1, Roby    [facility] Records Reviewed: Yes     ** Please Note: This note was completed in part utilizing [employer] Software  Grammatical errors, random word insertions, spelling mistakes, and incomplete sentences may be an occasional consequence of this system secondary to software limitations, ambient noise, and hardware issues  If you have any questions or concerns about the content, text, or information contained within the body of this dictation, please contact the provider for clarification  **

## 2021-03-16 NOTE — ASSESSMENT & PLAN NOTE
If patient is breastfeeding she should continue with breastfeeding    Given concern for mastitis Will place OB Gyn consultation  Consider Keflex at discharge

## 2021-03-16 NOTE — TELEPHONE ENCOUNTER
Pt called in to schedule appt for bp check, stated she is experiencing abdominal pain and some back pain and elevated bp since delivery   Please review

## 2021-03-16 NOTE — ED PROVIDER NOTES
History  Chief Complaint   Patient presents with    Chest Pain     Pt reports increased BP, swollen feet, chest palpitations, and HA   5 days postpartum  History provided by:  Patient   used: No    Chest Pain  Pain location:  Substernal area  Pain quality: aching    Pain radiates to:  Does not radiate  Pain radiates to the back: no    Pain severity:  Mild  Onset quality:  Gradual  Duration:  1 day  Timing:  Intermittent  Progression:  Waxing and waning  Chronicity:  New  Context: at rest    Relieved by:  Nothing  Worsened by:  Nothing tried  Ineffective treatments:  None tried  Associated symptoms: abdominal pain, headache and palpitations    Associated symptoms: no altered mental status, no back pain, no cough, no dizziness, no dysphagia, no fever, no lower extremity edema, no nausea, no shortness of breath, no syncope and not vomiting    Abdominal pain:     Location:  Generalized    Quality:  Aching    Severity:  Moderate    Onset quality:  Gradual    Duration:  4 days    Timing:  Intermittent    Progression:  Waxing and waning    Chronicity:  New  Headaches:     Severity:  Mild    Onset quality:  Gradual    Duration:  2 days    Timing:  Intermittent    Progression:  Waxing and waning    Chronicity:  New  Risk factors comment:  5 days Post-partum      Prior to Admission Medications   Prescriptions Last Dose Informant Patient Reported? Taking?    Accu-Chek Softclix Lancets lancets Not Taking at Unknown time Self No No   Sig: Use 4 a day or as instructed   Patient not taking: Reported on 3/16/2021   Blood Glucose Monitoring Suppl (Accu-Chek Guide) w/Device KIT Not Taking at Unknown time Self No No   Sig: Use 4 (four) times a day   Patient not taking: Reported on 3/16/2021   Blood Glucose Monitoring Suppl (OneTouch Verio Flex System) w/Device KIT Not Taking at Unknown time Self No No   Sig: Test 4 times per day fasting and 2 hours after the start of each meal   Patient not taking: Reported on 6/79/2303   OneTouch Delica Lancets 06V MISC Not Taking at Unknown time Self No No   Sig: Test 4 times per day fasting and 2 hours after the start of each meal   Patient not taking: Reported on 3/16/2021   Prenatal Vit-Fe Fumarate-FA (PRENATAL COMPLETE PO) Not Taking at Unknown time Self Yes No   Sig: Take 1 tablet by mouth daily  hydrocortisone 1 % cream Not Taking at Unknown time  No No   Sig: Apply 1 application topically 4 (four) times a day as needed for irritation   Patient not taking: Reported on 3/16/2021   ibuprofen (MOTRIN) 600 mg tablet   No Yes   Sig: Take 1 tablet (600 mg total) by mouth every 6 (six) hours as needed (cramping)   levothyroxine 88 mcg tablet  Self Yes Yes   Sig: Take 88 mcg by mouth daily   witch hazel-glycerin (TUCKS) topical pad Not Taking at Unknown time  No No   Sig: Apply 1 pad topically every 4 (four) hours as needed for irritation or hemorrhoids   Patient not taking: Reported on 3/16/2021      Facility-Administered Medications: None       Past Medical History:   Diagnosis Date    Chronic hypertension affecting pregnancy 11/25/2020    Disease of thyroid gland     Gestational diabetes mellitus (GDM) in third trimester 1/11/2021    Hypothyroid     Seasonal allergies     Urinary tract infection     Varicella        Past Surgical History:   Procedure Laterality Date    KNEE CARTILAGE SURGERY Right     WISDOM TOOTH EXTRACTION      WRIST SURGERY Left        Family History   Problem Relation Age of Onset    No Known Problems Mother     Hyperthyroidism Father     Pancreatic cancer Maternal Grandmother     Heart attack Maternal Grandfather     No Known Problems Paternal Grandmother     Kidney failure Paternal Grandfather     Breast cancer Neg Hx     Colon cancer Neg Hx     Ovarian cancer Neg Hx      I have reviewed and agree with the history as documented      E-Cigarette/Vaping    E-Cigarette Use Former User     Quit Date 1/1/16      E-Cigarette/Vaping Substances  Nicotine Yes     THC No     CBD No     Flavoring Yes     Other No     Unknown No      Social History     Tobacco Use    Smoking status: Former Smoker     Packs/day: 1 00     Years: 4 00     Pack years: 4 00     Types: Cigarettes     Quit date: 2016     Years since quittin 2    Smokeless tobacco: Never Used   Substance Use Topics    Alcohol use: Not Currently     Comment: rarely prior to pregnancy    Drug use: Never       Review of Systems   Constitutional: Negative for chills and fever  HENT: Negative for facial swelling, sore throat and trouble swallowing  Eyes: Negative for pain and visual disturbance  Respiratory: Negative for cough and shortness of breath  Cardiovascular: Positive for chest pain and palpitations  Negative for leg swelling and syncope  Gastrointestinal: Positive for abdominal pain  Negative for diarrhea, nausea and vomiting  Genitourinary: Negative for dysuria and flank pain  Musculoskeletal: Negative for back pain, neck pain and neck stiffness  Skin: Negative for pallor and rash  Allergic/Immunologic: Negative for environmental allergies and immunocompromised state  Neurological: Positive for headaches  Negative for dizziness  Hematological: Negative for adenopathy  Does not bruise/bleed easily  Psychiatric/Behavioral: Negative for agitation and behavioral problems  All other systems reviewed and are negative  Physical Exam  Physical Exam  Vitals signs and nursing note reviewed  Constitutional:       General: She is not in acute distress  Appearance: She is well-developed  HENT:      Head: Normocephalic and atraumatic  Eyes:      Extraocular Movements: Extraocular movements intact  Pupils: Pupils are equal, round, and reactive to light  Neck:      Musculoskeletal: Normal range of motion and neck supple  Cardiovascular:      Rate and Rhythm: Regular rhythm  Bradycardia present     Pulmonary:      Effort: Pulmonary effort is normal    Abdominal:      Palpations: Abdomen is soft  Tenderness: There is no abdominal tenderness  There is no guarding or rebound  Musculoskeletal: Normal range of motion  Skin:     General: Skin is warm and dry  Neurological:      General: No focal deficit present  Mental Status: She is alert and oriented to person, place, and time  Cranial Nerves: No cranial nerve deficit  Motor: No weakness        Coordination: Coordination normal          Vital Signs  ED Triage Vitals [03/16/21 1406]   Temperature Pulse Respirations Blood Pressure SpO2   98 8 °F (37 1 °C) (!) 52 18 146/67 100 %      Temp Source Heart Rate Source Patient Position - Orthostatic VS BP Location FiO2 (%)   Oral Monitor Sitting Right arm --      Pain Score       6           Vitals:    03/16/21 1406 03/16/21 1525 03/16/21 1630 03/16/21 1730   BP: 146/67 154/74 168/87 142/94   Pulse: (!) 52 (!) 49 56 63   Patient Position - Orthostatic VS: Sitting Lying Lying Lying         Visual Acuity      ED Medications  Medications   acetaminophen (TYLENOL) tablet 650 mg (has no administration in time range)   docusate sodium (COLACE) capsule 100 mg (has no administration in time range)   ondansetron (ZOFRAN) injection 4 mg (has no administration in time range)   levothyroxine tablet 88 mcg (has no administration in time range)   saccharomyces boulardii (FLORASTOR) capsule 250 mg (has no administration in time range)   ketorolac (TORADOL) injection 15 mg (15 mg Intravenous Given 3/16/21 1513)   aluminum-magnesium hydroxide-simethicone (MYLANTA) oral suspension 30 mL (30 mL Oral Given 3/16/21 1514)   Lidocaine Viscous HCl (XYLOCAINE) 2 % mucosal solution 15 mL (15 mL Swish & Swallow Given 3/16/21 1514)   famotidine (PEPCID) injection 20 mg (20 mg Intravenous Given 3/16/21 1513)   ceftriaxone (ROCEPHIN) 1 g/50 mL in dextrose IVPB (1,000 mg Intravenous New Bag 3/16/21 1706)       Diagnostic Studies  Results Reviewed     Procedure Component Value Units Date/Time    Lyme Antibody Profile with reflex to [facility] [[id number]] Collected: 03/16/21 1635    Lab Status: In process Specimen: Blood from Arm, Left Updated: 03/16/21 1715    Blood culture #1 [[id number]] Collected: 03/16/21 1635    Lab Status: In process Specimen: Blood from Arm, Left Updated: 03/16/21 1714    Blood culture #2 [[id number]] Collected: 03/16/21 1705    Lab Status: In process Specimen: Blood from Hand, Left Updated: 03/16/21 1713    TSH, 3rd generation with Free T4 reflex [[id number]]  (Normal) Collected: 03/16/21 1438    Lab Status: Final result Specimen: Blood from Arm, Right Updated: 03/16/21 1624     TSH 3RD GENERATON 2 755 uIU/mL     Narrative:      Patients undergoing fluorescein dye angiography may retain small amounts of fluorescein in the body for 48-72 hours post procedure  Samples containing fluorescein can produce falsely depressed TSH values  If the patient had this procedure,a specimen should be resubmitted post fluorescein clearance  NT-BNP PRO [[id number]]  (Abnormal) Collected: 03/16/21 1438    Lab Status: Final result Specimen: Blood from Arm, Right Updated: 03/16/21 1605     NT-proBNP 703 pg/mL     Lipase [[id number]]  (Normal) Collected: 03/16/21 1438    Lab Status: Final result Specimen: Blood from Arm, Right Updated: 03/16/21 1605     Lipase 76 u/L     TSH [[id number]]  (Normal) Collected: 03/16/21 1438    Lab Status: Final result Specimen: Blood from Arm, Right Updated: 03/16/21 1605     TSH 3RD GENERATON 2 755 uIU/mL     Narrative:      Patients undergoing fluorescein dye angiography may retain small amounts of fluorescein in the body for 48-72 hours post procedure  Samples containing fluorescein can produce falsely depressed TSH values  If the patient had this procedure,a specimen should be resubmitted post fluorescein clearance        Urine Microscopic [[id number]]  (Abnormal) Collected: 03/16/21 3787    Lab Status: Final result Specimen: Urine, Clean Catch Updated: 03/16/21 1543     RBC, UA Innumerable /hpf      WBC, UA Innumerable /hpf      Epithelial Cells Occasional /hpf      Bacteria, UA Innumerable /hpf     Urine culture [[id number]] Collected: 03/16/21 1455    Lab Status:  In process Specimen: Urine, Clean Catch Updated: 03/16/21 1543    UA w Reflex to Microscopic w Reflex to Culture [[id number]]  (Abnormal) Collected: 03/16/21 1455    Lab Status: Final result Specimen: Urine, Clean Catch Updated: 03/16/21 1513     Color, UA Orange     Clarity, UA Slightly Cloudy     Specific Gravity, UA 1 015     pH, UA 6 5     Leukocytes, UA Large     Nitrite, UA Negative     Protein, UA Negative mg/dl      Glucose, UA Negative mg/dl      Ketones, UA Negative mg/dl      Urobilinogen, UA 0 2 E U /dl      Bilirubin, UA Negative     Blood, UA Large    Comprehensive metabolic panel [[id number]]  (Abnormal) Collected: 03/16/21 1438    Lab Status: Final result Specimen: Blood from Arm, Right Updated: 03/16/21 1505     Sodium 144 mmol/L      Potassium 3 8 mmol/L      Chloride 107 mmol/L      CO2 27 mmol/L      ANION GAP 10 mmol/L      BUN 10 mg/dL      Creatinine 0 82 mg/dL      Glucose 95 mg/dL      Calcium 9 4 mg/dL      Corrected Calcium 10 5 mg/dL      AST 35 U/L      ALT 64 U/L      Alkaline Phosphatase 99 U/L      Total Protein 6 8 g/dL      Albumin 2 6 g/dL      Total Bilirubin 0 18 mg/dL      eGFR 100 ml/min/1 73sq m     Narrative:      National Kid[employer] guidelines for Chronic Kidney Disease (CKD):     Stage 1 with normal or high GFR (GFR > 90 mL/min/1 73 square meters)    Stage 2 Mild CKD (GFR = 60-89 mL/min/1 73 square meters)    Stage 3A Moderate CKD (GFR = 45-59 mL/min/1 73 square meters)    Stage 3B Moderate CKD (GFR = 30-44 mL/min/1 73 square meters)    Stage 4 Severe CKD (GFR = 15-29 mL/min/1 73 square meters)    Stage 5 End Stage CKD (GFR <15 mL/min/1 73 square meters)  Note: GFR calculation is accurate only with a steady state creatinine    Troponin I [[id number]]  (Normal) Collected: 03/16/21 1438    Lab Status: Final result Specimen: Blood from Arm, Right Updated: 03/16/21 1503     Troponin I <0 02 ng/mL     Protime-INR [[id number]]  (Normal) Collected: 03/16/21 1438    Lab Status: Final result Specimen: Blood from Arm, Right Updated: 03/16/21 1455     Protime 13 0 seconds      INR 1 00    APTT [[id number]]  (Normal) Collected: 03/16/21 1438    Lab Status: Final result Specimen: Blood from Arm, Right Updated: 03/16/21 1455     PTT 26 seconds     CBC and differential [[id number]]  (Abnormal) Collected: 03/16/21 1438    Lab Status: Final result Specimen: Blood from Arm, Right Updated: 03/16/21 1446     WBC 11 55 Thousand/uL      RBC 3 96 Million/uL      Hemoglobin 12 0 g/dL      Hematocrit 38 2 %      MCV 97 fL      MCH 30 3 pg      MCHC 31 4 g/dL      RDW 13 2 %      MPV 9 7 fL      Platelets 941 Thousands/uL      nRBC 0 /100 WBCs      Neutrophils Relative 85 %      Immat GRANS % 1 %      Lymphocytes Relative 7 %      Monocytes Relative 5 %      Eosinophils Relative 2 %      Basophils Relative 0 %      Neutrophils Absolute 9 78 Thousands/µL      Immature Grans Absolute 0 13 Thousand/uL      Lymphocytes Absolute 0 84 Thousands/µL      Monocytes Absolute 0 55 Thousand/µL      Eosinophils Absolute 0 21 Thousand/µL      Basophils Absolute 0 04 Thousands/µL     Fingerstick Glucose (POCT) [[id number]]  (Normal) Collected: 03/16/21 1442    Lab Status: Final result Updated: 03/16/21 1445     POC Glucose 85 mg/dl                  XR chest 1 view portable   Final Result by Roderick Gómez MD (03/16 2055)      No acute cardiopulmonary disease                    Workstation performed: [id number]                    Procedures  ECG 12 Lead Documentation Only    Date/Time: 3/16/2021 3:21 PM  Performed by: Stephanie Hartmann MD  Authorized by: Stephanie Hartmann MD     Indications / Diagnosis:  Palpitations  ECG reviewed by me, the ED Provider: yes    Patient location:  ED  Interpretation: Interpretation: normal    Rate:     ECG rate assessment: bradycardic    Rhythm:     Rhythm: sinus rhythm    Ectopy:     Ectopy: none    QRS:     QRS axis:  Normal  Conduction:     Conduction: normal    ST segments:     ST segments:  Normal  T waves:     T waves: normal    Comments:      Possible sinus pause versus Mobitz type 1 heart block             ED Course  ED Course as of Mar 16 1748   Tue Mar 16, 2021   1500 WBC(!): 11 55   1500 Hemoglobin: 12 0   1500 CBC stable  Platelet Count: 784   1505 Sodium: 144   1509 Potassium: 3 8   1509 BUN: 10   1509 Creatinine: 0 82   1509 AST: 35   1509 CMP noted  ALT: 64   1509 Trop negative  Troponin I: <0 02   1535 Blood Pressure: 154/74   1535 Pulse(!): 49   1535 Respirations: 18   1535 Repeat /74, case was discussed with OBGYN resident soon after arrival via [employer]  SpO2: 99 %   1600 Patient develops transient bradycardia, heart rate drops to 40s, EKG shows possible Mobitz type 1 heart block, discussed with Cardiology, Dr Chavo Ibarra, agree with observation in hospital       1601 WBC, UA(!): Innumerable   1601 Urine noted for WBC and bacteria, we will send Bld Cx, give dose of Ceftriaxone     Bacteria, UA(!): Innumerable                                           MDM  Number of Diagnoses or Management Options  Abdominal pain: new and requires workup  Bradycardia: new and requires workup  Chest pain: new and requires workup  Mastitis:   Palpitations: new and requires workup  UTI (urinary tract infection):   Diagnosis management comments: Patient is a 20-year-old female, 5 days postpartum, comes in with multiple complaints, abdominal pain, chest pain, headache, palpitations, states symptoms have been going on for the past 3 4 days since she was discharged, checked her blood pressure at home systolic blood pressure was 150s, no history of preeclampsia in the past, patient had gestational diabetes which is controlled with diet, does take thyroid medication  ; patient states that she has been taking ibuprofen around the clock for abdominal pain as she was told not to take Tylenol due to liver enzyme elevation at home  On exam, no acute distress:  Vital signs stable, overall well-appearing, pupils equal round reactive light, no cranial nerve or focal neuro deficits, no respiratory distress, oxygen saturation 100% on room air, no increased work of breathing, pulses regular, bilateral equal, abdomen soft, mild tenderness in epigastrium, no peripheral edema, no calf tenderness or swelling  Impression: Gastritis, GERD, possibly secondary to regular ibuprofen use at home, elevated blood pressure, rule out preeclampsia, nonspecific chest pain, palpitations, rule out electrolyte imbalance, anemia dehydration, will check labs, EKG, chest x-ray, check face with OBGYN         Amount and/or Complexity of Data Reviewed  Clinical lab tests: reviewed and ordered  Tests in the radiology section of CPT®: ordered and reviewed  Tests in the medicine section of CPT®: ordered and reviewed  Discuss the patient with other providers: yes  Independent visualization of images, tracings, or specimens: yes        Disposition  Final diagnoses:   Chest pain   Abdominal pain   Palpitations   Mastitis   UTI (urinary tract infection)   Bradycardia     Time reflects when diagnosis was documented in both MDM as applicable and the Disposition within this note     Time User Action Codes Description Comment    3/16/2021  3:21 PM Joylene Emms Add [R07 9] Chest pain     3/16/2021  3:21 PM Joylene Emms Add [R10 9] Abdominal pain     3/16/2021  3:22 PM Joylene Emms Add [R00 2] Palpitations     3/16/2021  3:50 PM Joylene Emms Add [N61 0] Mastitis     3/16/2021  3:50 PM Joylene Emms Add [N39 0] UTI (urinary tract infection)     3/16/2021  4:06 PM Joylene Emms Add [R00 1] Bradycardia       ED Disposition     ED Disposition Condition Date/Time Comment    Admit Stable Tue Mar 16, 2021  4:00 PM Case was discussed with Dr Nasreen Yee and the patient's admission status was agreed to be Admission Status: observation status to the service of Dr Nasreen Yee  Follow-up Information    None         Current Discharge Medication List      CONTINUE these medications which have NOT CHANGED    Details   ibuprofen (MOTRIN) 600 mg tablet Take 1 tablet (600 mg total) by mouth every 6 (six) hours as needed (cramping)  Qty: 50 tablet, Refills: 1    Associated Diagnoses:  (spontaneous vaginal delivery)      levothyroxine 88 mcg tablet Take 88 mcg by mouth daily      ! ! Accu-Chek Softclix Lancets lancets Use 4 a day or as instructed  Qty: 100 each, Refills: 3    Associated Diagnoses: Gestational diabetes mellitus (GDM), antepartum, gestational diabetes method of control unspecified      !! Blood Glucose Monitoring Suppl (Accu-Chek Guide) w/Device KIT Use 4 (four) times a day  Qty: 1 kit, Refills: 0    Associated Diagnoses: Gestational diabetes mellitus (GDM), antepartum, gestational diabetes method of control unspecified      !! Blood Glucose Monitoring Suppl (OneTouch Verio Flex System) w/Device KIT Test 4 times per day fasting and 2 hours after the start of each meal  Qty: 1 kit, Refills: 0    Associated Diagnoses: Gestational diabetes mellitus (GDM), antepartum, gestational diabetes method of control unspecified      hydrocortisone 1 % cream Apply 1 application topically 4 (four) times a day as needed for irritation  Qty: 30 g, Refills: 0    Associated Diagnoses:  (spontaneous vaginal delivery)      !! OneTouch Delica Lancets 42M MISC Test 4 times per day fasting and 2 hours after the start of each meal  Qty: 100 each, Refills: 3    Associated Diagnoses: Gestational diabetes mellitus (GDM), antepartum, gestational diabetes method of control unspecified      Prenatal Vit-Fe Fumarate-FA (PRENATAL COMPLETE PO) Take 1 tablet by mouth daily        witch hazel-glycerin (TUCKS) topical pad Apply 1 pad topically every 4 (four) hours as needed for irritation or hemorrhoids  Qty:  , Refills: 0    Associated Diagnoses:  (spontaneous vaginal delivery)       ! ! - Potential duplicate medications found  Please discuss with provider  No discharge procedures on file      PDMP Review     None          ED Provider  Electronically Signed by           Mike Majano MD  [phone]

## 2021-03-16 NOTE — ASSESSMENT & PLAN NOTE
Lab Results   Component Value Date    HGBA1C 5 7 08/28/2017       Recent Labs     03/16/21  1442   POCGLU 85       Blood Sugar Average: Last 72 hrs:  (P) 85     History of gestational diabetes, this is an increased risk factor for diabetes as an older adult  Recommend outpatient weight loss

## 2021-03-16 NOTE — ASSESSMENT & PLAN NOTE
Asymptomatic- will place on telemetry for now  Check TSH and free T4  Obtain echocardiogram  Evidence of Mobitz type 1 block, reasonable for outpatient Holter monitor  Appreciate cardiology recommendation  Will order Lyme titer to rule out Lyme as potential etiology  Doubt ACS

## 2021-03-16 NOTE — CONSULTS
Consultation - Gynecology  Dakota Dumont 22 y o  female MRN: [id number]  Unit/Bed#: ED 09 Encounter: [id number]      Inpatient consult to Obstetrics / Gynecology  Consult performed by: Alana Cerda MD  Consult ordered by: Mo Patel DO           Chief Complaint   Patient presents with   [place] Flower Chest Pain     Pt reports increased BP, swollen feet, chest palpitations, and HA   5 days postpartum  Assessment/Plan     Assessment: This is a 22year old [id number] with chronic HTN, now PPD#5 from a , presenting with multiple complaints including left breast pain and erythema, chest pain, palpations, headache, and abdominal pain  Given non-severe range blood pressures and a resolving headaches with labs non-concerning for HELLP syndrome, not concerned for preeclampsia at this time  Left breast erythema and pain consistent with mastitis  Her abdominal pain is likely from post-partum cramping, no concerns for endometritis at this time since afebrile, no leukocytosis, and no distinct fundal tenderness on exam  No signs of fluid overload with a normal CXR and normal exam, however agree with echocardiogram to rule out other pathology and peripartum cardiomyopathy  Plan:    1  Mastitis   - Dicloxacillin 500 mg QID x 7-10 days  - Recommend Sudafed TID until milk supply decreased to help with engorgement since not breastfeeding    2  Abdominal pain  - Unlikely endometritis, however if pain becomes worse and/or if she becomes febrile and develops leukocytosis, then will consider treatment with Unasyn     3  Bradycardia and chest pain  - Low suspicion for peripartum cardiomyopathy at this time  - Appreciate cardiology input; troponin WNL; Agree with echocardiogram and telemetry overnight  - Continue workup per primary team     4   Chronic HTN with headache   - If headache returns and worsens or if Bps become greater than 160/110, please contact ObGyn team as we may possibly need to start a magnesium drip for seizure prophylaxis secondary to developing preeclampsia     5  DVT ppx  - Would recommend Lovenox 40 mg while inpatient due to risk factors      Will continue to follow along while admitted         History of Present Illness   Physician Requesting Consult: Lacey Glass DO  Reason for Consult / Principal Problem: Mastitis   Subspeciality: ObGyn  HPI: Anita Wetzel is a 22 y o  female who is PPD# 5 from an uncomplicated  with chronic HTN, who presents with multiple complaints including abdominal pain, chest pain, palpitations, headache, and left breast pain  She states that she has noticed chest pain and palpitations but denies any shortness or breath or orthopnea  She describes her lower abdominal pain as worsening since delivery, but did resolve after having a dose of Toradol in the ED  Her headache had resolved after medication in the ED, but she was initially describing a frontal headache that wrapped around to the occipital region  She denies any visual symptoms or RUQ/epigastric pain  She has also noted pain in her left breast and noted erythema and engorgement  She is not breastfeeding and has tried cold cabbage leaves to help suppress milk production, but that does not help  She denies fevers or chills at home  Vaginal bleeding has been normal since delivery  Review of Systems   Constitutional: Negative for chills and fever  Eyes: Negative for visual disturbance  Respiratory: Negative for cough and shortness of breath  Cardiovascular: Positive for chest pain and palpitations  Negative for leg swelling  Gastrointestinal: Positive for abdominal pain  Negative for diarrhea, nausea and vomiting  Genitourinary: Positive for vaginal bleeding  Negative for vaginal discharge and vaginal pain  Neurological: Positive for headaches  Negative for dizziness and light-headedness         Historical Information   Past Medical History:   Diagnosis Date    Chronic hypertension affecting pregnancy 2020    Disease of thyroid gland     Gestational diabetes mellitus (GDM) in third trimester 2021    Hypothyroid     Seasonal allergies     Urinary tract infection     Varicella      Past Surgical History:   Procedure Laterality Date    KNEE CARTILAGE SURGERY Right     WISDOM TOOTH EXTRACTION      WRIST SURGERY Left      OB History    Para Term  AB Living   2 2 2     2   SAB TAB Ectopic Multiple Live Births         0 2      # Outcome Date GA Lbr Rohan/2nd Weight Sex Delivery Anes PTL Lv   2 Term 21 38w4d / 00:23 2860 g (6 lb 4 9 oz) F Vag-Spont EPI N KALEE   1 Term 16 37w6d  3147 g (6 lb 15 oz) M Vag-Spont EPI N KALEE     Family History   Problem Relation Age of Onset    No Known Problems Mother     Hyperthyroidism Father     Pancreatic cancer Maternal Grandmother     Heart attack Maternal Grandfather     No Known Problems Paternal Grandmother     Kidney failure Paternal Grandfather     Breast cancer Neg Hx     Colon cancer Neg Hx     Ovarian cancer Neg Hx      Social History   Social History     Substance and Sexual Activity   Alcohol Use Not Currently    Comment: rarely prior to pregnancy     Social History     Substance and Sexual Activity   Drug Use Never     Social History     Tobacco Use   Smoking Status Former Smoker    Packs/day: 1 00    Years: 4 00    Pack years: 4 00    Types: Cigarettes    Quit date: 2016    Years since quittin 2   Smokeless Tobacco Never Used       Meds/Allergies   Current Facility-Administered Medications   Medication Dose Route Frequency    acetaminophen (TYLENOL) tablet 650 mg  650 mg Oral Q6H PRN    ceftriaxone (ROCEPHIN) 1 g/50 mL in dextrose IVPB  1,000 mg Intravenous Once    docusate sodium (COLACE) capsule 100 mg  100 mg Oral BID    [START ON 3/17/2021] levothyroxine tablet 88 mcg  88 mcg Oral Daily    ondansetron (ZOFRAN) injection 4 mg  4 mg Intravenous Q6H PRN         Allergies   Allergen Reactions    Pollen Extract        Objective   Vitals: Blood pressure 154/74, pulse (!) 49, temperature 98 8 °F (37 1 °C), temperature source Oral, resp  rate 18, weight 101 kg (223 lb 1 7 oz), last menstrual period 06/14/2020, SpO2 99 %, currently breastfeeding  Body mass index is 38 3 kg/m²  No intake or output data in the 24 hours ending 03/16/21 1633    Invasive Devices     Peripheral Intravenous Line            Peripheral IV 03/16/21 Right Antecubital less than 1 day                Physical Exam  Constitutional:       General: She is not in acute distress  Appearance: She is not ill-appearing or toxic-appearing  HENT:      Head: Normocephalic and atraumatic  Pulmonary:      Effort: Pulmonary effort is normal  No respiratory distress  Breath sounds: No wheezing, rhonchi or rales  Abdominal:      General: There is no distension  Palpations: Abdomen is soft  Tenderness: There is abdominal tenderness  There is no guarding or rebound  Comments: Obese abdomen; mild tenderness to palpation of the lower and right abdomen, no distinct fundal tenderness    Musculoskeletal:         General: No swelling or tenderness  Skin:     General: Skin is warm and dry  Neurological:      General: No focal deficit present  Mental Status: She is alert and oriented to person, place, and time  Mental status is at baseline  Psychiatric:         Mood and Affect: Mood normal          Thought Content:  Thought content normal          Judgment: Judgment normal          Lab Results:   Recent Results (from the past 24 hour(s))   CBC and differential    Collection Time: 03/16/21  2:38 PM   Result Value Ref Range    WBC 11 55 (H) 4 31 - 10 16 Thousand/uL    RBC 3 96 3 81 - 5 12 Million/uL    Hemoglobin 12 0 11 5 - 15 4 g/dL    Hematocrit 38 2 34 8 - 46 1 %    MCV 97 82 - 98 fL    MCH 30 3 26 8 - 34 3 pg    MCHC 31 4 31 4 - 37 4 g/dL    RDW 13 2 11 6 - 15 1 %    MPV 9 7 8 9 - 12 7 fL    Platelets 089 349 - 256 Thousands/uL nRBC 0 /100 WBCs    Neutrophils Relative 85 (H) 43 - 75 %    Immat GRANS % 1 0 - 2 %    Lymphocytes Relative 7 (L) 14 - 44 %    Monocytes Relative 5 4 - 12 %    Eosinophils Relative 2 0 - 6 %    Basophils Relative 0 0 - 1 %    Neutrophils Absolute 9 78 (H) 1 85 - 7 62 Thousands/µL    Immature Grans Absolute 0 13 0 00 - 0 20 Thousand/uL    Lymphocytes Absolute 0 84 0 60 - 4 47 Thousands/µL    Monocytes Absolute 0 55 0 17 - 1 22 Thousand/µL    Eosinophils Absolute 0 21 0 00 - 0 61 Thousand/µL    Basophils Absolute 0 04 0 00 - 0 10 Thousands/µL   Protime-INR    Collection Time: 03/16/21  2:38 PM   Result Value Ref Range    Protime 13 0 11 6 - 14 5 seconds    INR 1 00 0 84 - 1 19   APTT    Collection Time: 03/16/21  2:38 PM   Result Value Ref Range    PTT 26 23 - 37 seconds   Comprehensive metabolic panel    Collection Time: 03/16/21  2:38 PM   Result Value Ref Range    Sodium 144 136 - 145 mmol/L    Potassium 3 8 3 5 - 5 3 mmol/L    Chloride 107 100 - 108 mmol/L    CO2 27 21 - 32 mmol/L    ANION GAP 10 4 - 13 mmol/L    BUN 10 5 - 25 mg/dL    Creatinine 0 82 0 60 - 1 30 mg/dL    Glucose 95 65 - 140 mg/dL    Calcium 9 4 8 3 - 10 1 mg/dL    Corrected Calcium 10 5 (H) 8 3 - 10 1 mg/dL    AST 35 5 - 45 U/L    ALT 64 12 - 78 U/L    Alkaline Phosphatase 99 46 - 116 U/L    Total Protein 6 8 6 4 - 8 2 g/dL    Albumin 2 6 (L) 3 5 - 5 0 g/dL    Total Bilirubin 0 18 (L) 0 20 - 1 00 mg/dL    eGFR 100 ml/min/1 73sq m   Troponin I    Collection Time: 03/16/21  2:38 PM   Result Value Ref Range    Troponin I <0 02 <=0 04 ng/mL   NT-BNP PRO    Collection Time: 03/16/21  2:38 PM   Result Value Ref Range    NT-proBNP 703 (H) <125 pg/mL   Lipase    Collection Time: 03/16/21  2:38 PM   Result Value Ref Range    Lipase 76 73 - 393 u/L   TSH    Collection Time: 03/16/21  2:38 PM   Result Value Ref Range    TSH 3RD GENERATON 2 755 0 358 - 3 740 uIU/mL   TSH, 3rd generation with Free T4 reflex    Collection Time: 03/16/21  2:38 PM   Result Value Ref Range    TSH 3RD GENERATON 2 755 0 358 - 3 740 uIU/mL   Fingerstick Glucose (POCT)    Collection Time: 03/16/21  2:42 PM   Result Value Ref Range    POC Glucose 85 65 - 140 mg/dl   UA w Reflex to Microscopic w Reflex to Culture    Collection Time: 03/16/21  2:55 PM    Specimen: Urine, Clean Catch   Result Value Ref Range    Color, UA Orange     Clarity, UA Slightly Cloudy     Specific [place], UA 1 015 1 003 - 1 030    pH, UA 6 5 4 5, 5 0, 5 5, 6 0, 6 5, 7 0, 7 5, 8 0    Leukocytes, UA Large (A) Negative    Nitrite, UA Negative Negative    Protein, UA Negative Negative mg/dl    Glucose, UA Negative Negative mg/dl    Ketones, UA Negative Negative mg/dl    Urobilinogen, UA 0 2 0 2, 1 0 E U /dl E U /dl    Bilirubin, UA Negative Negative    Blood, UA Large (A) Negative   Urine Microscopic    Collection Time: 03/16/21  2:55 PM   Result Value Ref Range    RBC, UA Innumerable (A) None Seen, 2-4 /hpf    WBC, UA Innumerable (A) None Seen, 2-4 /hpf    Epithelial Cells Occasional None Seen, Occasional /hpf    Bacteria, UA Innumerable (A) None Seen, Occasional /hpf           Counseling / Coordination of Care  Total floor / unit time spent today1 hour  minutes  Greater than 50% of total time was spent with the patient and / or family counseling and / or coordination of care       Juan Singletary MD  3/16/2021  4:33 PM

## 2021-03-16 NOTE — TELEPHONE ENCOUNTER
Pt called in very upset and crying, stated that she had her baby 6 days ago and her blood pressure has been elevated and around 157/98  Pt would like to know if there is anything she can do to lower her blood pressure, please review

## 2021-03-16 NOTE — PROGRESS NOTES
Here for BP check  States BP was 157/98 and 155/98 at home  ML=840/96 in office  C/O frequent palpitations  Mild swelling in feet  Discussed with Dr Bairon Alcantar  Ed advised d/t frequent palpitations    Patient agreeable to same and states she will go immediatly

## 2021-03-17 ENCOUNTER — APPOINTMENT (OUTPATIENT)
Dept: NON INVASIVE DIAGNOSTICS | Facility: HOSPITAL | Age: 26
DRG: 776 | End: 2021-03-17
Payer: COMMERCIAL

## 2021-03-17 PROBLEM — O11.9 CHRONIC HYPERTENSION WITH SUPERIMPOSED PRE-ECLAMPSIA: Status: ACTIVE | Noted: 2021-03-17

## 2021-03-17 PROBLEM — R79.89 ELEVATED BRAIN NATRIURETIC PEPTIDE (BNP) LEVEL: Status: ACTIVE | Noted: 2021-03-17

## 2021-03-17 LAB
ALBUMIN SERPL BCP-MCNC: 2.2 G/DL (ref 3.5–5)
ALBUMIN SERPL BCP-MCNC: 2.6 G/DL (ref 3.5–5)
ALBUMIN SERPL BCP-MCNC: 2.7 G/DL (ref 3.5–5)
ALP SERPL-CCNC: 82 U/L (ref 46–116)
ALP SERPL-CCNC: 94 U/L (ref 46–116)
ALP SERPL-CCNC: 98 U/L (ref 46–116)
ALT SERPL W P-5'-P-CCNC: 55 U/L (ref 12–78)
ALT SERPL W P-5'-P-CCNC: 64 U/L (ref 12–78)
ALT SERPL W P-5'-P-CCNC: 70 U/L (ref 12–78)
ANION GAP SERPL CALCULATED.3IONS-SCNC: 10 MMOL/L (ref 4–13)
ANION GAP SERPL CALCULATED.3IONS-SCNC: 11 MMOL/L (ref 4–13)
ANION GAP SERPL CALCULATED.3IONS-SCNC: 11 MMOL/L (ref 4–13)
AST SERPL W P-5'-P-CCNC: 26 U/L (ref 5–45)
AST SERPL W P-5'-P-CCNC: 31 U/L (ref 5–45)
AST SERPL W P-5'-P-CCNC: 46 U/L (ref 5–45)
ATRIAL RATE: 69 BPM
ATRIAL RATE: 74 BPM
B BURGDOR IGG+IGM SER-ACNC: 22
BILIRUB SERPL-MCNC: 0.11 MG/DL (ref 0.2–1)
BILIRUB SERPL-MCNC: 0.13 MG/DL (ref 0.2–1)
BILIRUB SERPL-MCNC: 0.23 MG/DL (ref 0.2–1)
BUN SERPL-MCNC: 10 MG/DL (ref 5–25)
BUN SERPL-MCNC: 10 MG/DL (ref 5–25)
BUN SERPL-MCNC: 9 MG/DL (ref 5–25)
CALCIUM ALBUM COR SERPL-MCNC: 10 MG/DL (ref 8.3–10.1)
CALCIUM ALBUM COR SERPL-MCNC: 8.7 MG/DL (ref 8.3–10.1)
CALCIUM ALBUM COR SERPL-MCNC: 9.6 MG/DL (ref 8.3–10.1)
CALCIUM SERPL-MCNC: 7.3 MG/DL (ref 8.3–10.1)
CALCIUM SERPL-MCNC: 8.5 MG/DL (ref 8.3–10.1)
CALCIUM SERPL-MCNC: 9 MG/DL (ref 8.3–10.1)
CHLORIDE SERPL-SCNC: 105 MMOL/L (ref 100–108)
CHLORIDE SERPL-SCNC: 107 MMOL/L (ref 100–108)
CHLORIDE SERPL-SCNC: 109 MMOL/L (ref 100–108)
CO2 SERPL-SCNC: 25 MMOL/L (ref 21–32)
CO2 SERPL-SCNC: 26 MMOL/L (ref 21–32)
CO2 SERPL-SCNC: 27 MMOL/L (ref 21–32)
CREAT SERPL-MCNC: 0.9 MG/DL (ref 0.6–1.3)
CREAT SERPL-MCNC: 0.99 MG/DL (ref 0.6–1.3)
CREAT SERPL-MCNC: 1.12 MG/DL (ref 0.6–1.3)
CREAT UR-MCNC: 58.7 MG/DL
ERYTHROCYTE [DISTWIDTH] IN BLOOD BY AUTOMATED COUNT: 13.1 % (ref 11.6–15.1)
ERYTHROCYTE [DISTWIDTH] IN BLOOD BY AUTOMATED COUNT: 13.1 % (ref 11.6–15.1)
ERYTHROCYTE [DISTWIDTH] IN BLOOD BY AUTOMATED COUNT: 13.2 % (ref 11.6–15.1)
GFR SERPL CREATININE-BSD FRML MDRD: 68 ML/MIN/1.73SQ M
GFR SERPL CREATININE-BSD FRML MDRD: 79 ML/MIN/1.73SQ M
GFR SERPL CREATININE-BSD FRML MDRD: 89 ML/MIN/1.73SQ M
GLUCOSE P FAST SERPL-MCNC: 120 MG/DL (ref 65–99)
GLUCOSE SERPL-MCNC: 120 MG/DL (ref 65–140)
GLUCOSE SERPL-MCNC: 129 MG/DL (ref 65–140)
GLUCOSE SERPL-MCNC: 88 MG/DL (ref 65–140)
HCT VFR BLD AUTO: 34.4 % (ref 34.8–46.1)
HCT VFR BLD AUTO: 38.6 % (ref 34.8–46.1)
HCT VFR BLD AUTO: 39.1 % (ref 34.8–46.1)
HGB BLD-MCNC: 11 G/DL (ref 11.5–15.4)
HGB BLD-MCNC: 12.5 G/DL (ref 11.5–15.4)
HGB BLD-MCNC: 12.6 G/DL (ref 11.5–15.4)
MAGNESIUM SERPL-MCNC: 4.1 MG/DL (ref 1.6–2.6)
MAGNESIUM SERPL-MCNC: 5 MG/DL (ref 1.6–2.6)
MCH RBC QN AUTO: 31 PG (ref 26.8–34.3)
MCHC RBC AUTO-ENTMCNC: 32 G/DL (ref 31.4–37.4)
MCHC RBC AUTO-ENTMCNC: 32.2 G/DL (ref 31.4–37.4)
MCHC RBC AUTO-ENTMCNC: 32.4 G/DL (ref 31.4–37.4)
MCV RBC AUTO: 96 FL (ref 82–98)
MCV RBC AUTO: 96 FL (ref 82–98)
MCV RBC AUTO: 97 FL (ref 82–98)
P AXIS: 49 DEGREES
P AXIS: 78 DEGREES
PLATELET # BLD AUTO: 335 THOUSANDS/UL (ref 149–390)
PLATELET # BLD AUTO: 359 THOUSANDS/UL (ref 149–390)
PLATELET # BLD AUTO: 374 THOUSANDS/UL (ref 149–390)
PMV BLD AUTO: 9.7 FL (ref 8.9–12.7)
PMV BLD AUTO: 9.8 FL (ref 8.9–12.7)
PMV BLD AUTO: 9.9 FL (ref 8.9–12.7)
POTASSIUM SERPL-SCNC: 3.4 MMOL/L (ref 3.5–5.3)
POTASSIUM SERPL-SCNC: 3.5 MMOL/L (ref 3.5–5.3)
POTASSIUM SERPL-SCNC: 3.9 MMOL/L (ref 3.5–5.3)
PR INTERVAL: 160 MS
PROT SERPL-MCNC: 6.1 G/DL (ref 6.4–8.2)
PROT SERPL-MCNC: 6.9 G/DL (ref 6.4–8.2)
PROT SERPL-MCNC: 7.3 G/DL (ref 6.4–8.2)
PROT UR-MCNC: <6 MG/DL
PROT/CREAT UR: <0.1 MG/G{CREAT} (ref 0–0.1)
QRS AXIS: 74 DEGREES
QRS AXIS: 78 DEGREES
QRSD INTERVAL: 74 MS
QRSD INTERVAL: 78 MS
QT INTERVAL: 398 MS
QT INTERVAL: 404 MS
QTC INTERVAL: 387 MS
QTC INTERVAL: 448 MS
RBC # BLD AUTO: 3.55 MILLION/UL (ref 3.81–5.12)
RBC # BLD AUTO: 4.03 MILLION/UL (ref 3.81–5.12)
RBC # BLD AUTO: 4.07 MILLION/UL (ref 3.81–5.12)
SODIUM SERPL-SCNC: 143 MMOL/L (ref 136–145)
SODIUM SERPL-SCNC: 144 MMOL/L (ref 136–145)
SODIUM SERPL-SCNC: 144 MMOL/L (ref 136–145)
T WAVE AXIS: 36 DEGREES
T WAVE AXIS: 51 DEGREES
VENTRICULAR RATE: 57 BPM
VENTRICULAR RATE: 74 BPM
WBC # BLD AUTO: 8.29 THOUSAND/UL (ref 4.31–10.16)
WBC # BLD AUTO: 8.32 THOUSAND/UL (ref 4.31–10.16)
WBC # BLD AUTO: 9.68 THOUSAND/UL (ref 4.31–10.16)

## 2021-03-17 PROCEDURE — 99232 SBSQ HOSP IP/OBS MODERATE 35: CPT | Performed by: INTERNAL MEDICINE

## 2021-03-17 PROCEDURE — 80053 COMPREHEN METABOLIC PANEL: CPT | Performed by: OBSTETRICS & GYNECOLOGY

## 2021-03-17 PROCEDURE — 83735 ASSAY OF MAGNESIUM: CPT | Performed by: OBSTETRICS & GYNECOLOGY

## 2021-03-17 PROCEDURE — 93005 ELECTROCARDIOGRAM TRACING: CPT

## 2021-03-17 PROCEDURE — 84156 ASSAY OF PROTEIN URINE: CPT | Performed by: OBSTETRICS & GYNECOLOGY

## 2021-03-17 PROCEDURE — 82570 ASSAY OF URINE CREATININE: CPT | Performed by: OBSTETRICS & GYNECOLOGY

## 2021-03-17 PROCEDURE — 85027 COMPLETE CBC AUTOMATED: CPT | Performed by: INTERNAL MEDICINE

## 2021-03-17 PROCEDURE — 85027 COMPLETE CBC AUTOMATED: CPT | Performed by: OBSTETRICS & GYNECOLOGY

## 2021-03-17 PROCEDURE — 93010 ELECTROCARDIOGRAM REPORT: CPT | Performed by: INTERNAL MEDICINE

## 2021-03-17 PROCEDURE — 93306 TTE W/DOPPLER COMPLETE: CPT

## 2021-03-17 PROCEDURE — 99232 SBSQ HOSP IP/OBS MODERATE 35: CPT | Performed by: OBSTETRICS & GYNECOLOGY

## 2021-03-17 RX ORDER — HYDRALAZINE HYDROCHLORIDE 20 MG/ML
10 INJECTION INTRAMUSCULAR; INTRAVENOUS ONCE
Status: COMPLETED | OUTPATIENT
Start: 2021-03-17 | End: 2021-03-17

## 2021-03-17 RX ORDER — MAGNESIUM SULFATE HEPTAHYDRATE 40 MG/ML
1 INJECTION, SOLUTION INTRAVENOUS CONTINUOUS
Status: DISCONTINUED | OUTPATIENT
Start: 2021-03-17 | End: 2021-03-18

## 2021-03-17 RX ORDER — MAGNESIUM SULFATE HEPTAHYDRATE 40 MG/ML
4 INJECTION, SOLUTION INTRAVENOUS ONCE
Status: COMPLETED | OUTPATIENT
Start: 2021-03-17 | End: 2021-03-17

## 2021-03-17 RX ORDER — POTASSIUM CHLORIDE 20 MEQ/1
20 TABLET, EXTENDED RELEASE ORAL ONCE
Status: COMPLETED | OUTPATIENT
Start: 2021-03-17 | End: 2021-03-17

## 2021-03-17 RX ORDER — ACETAMINOPHEN 325 MG/1
650 TABLET ORAL EVERY 6 HOURS PRN
Status: DISCONTINUED | OUTPATIENT
Start: 2021-03-17 | End: 2021-03-18 | Stop reason: HOSPADM

## 2021-03-17 RX ORDER — MAGNESIUM SULFATE HEPTAHYDRATE 40 MG/ML
2 INJECTION, SOLUTION INTRAVENOUS ONCE
Status: COMPLETED | OUTPATIENT
Start: 2021-03-17 | End: 2021-03-17

## 2021-03-17 RX ADMIN — POTASSIUM CHLORIDE 20 MEQ: 1500 TABLET, EXTENDED RELEASE ORAL at 18:41

## 2021-03-17 RX ADMIN — ACETAMINOPHEN 650 MG: 325 TABLET ORAL at 10:27

## 2021-03-17 RX ADMIN — LEVOTHYROXINE SODIUM 88 MCG: 88 TABLET ORAL at 05:24

## 2021-03-17 RX ADMIN — DICLOXACILLIN SODIUM 500 MG: 250 CAPSULE ORAL at 05:23

## 2021-03-17 RX ADMIN — PSEUDOEPHEDRINE HYDROCHLORIDE 60 MG: 60 TABLET ORAL at 05:24

## 2021-03-17 RX ADMIN — Medication 250 MG: at 17:53

## 2021-03-17 RX ADMIN — ACETAMINOPHEN 650 MG: 325 TABLET, COATED ORAL at 21:17

## 2021-03-17 RX ADMIN — Medication 250 MG: at 08:38

## 2021-03-17 RX ADMIN — MAGNESIUM SULFATE HEPTAHYDRATE 4 G: 40 INJECTION, SOLUTION INTRAVENOUS at 12:22

## 2021-03-17 RX ADMIN — DICLOXACILLIN SODIUM 500 MG: 250 CAPSULE ORAL at 17:52

## 2021-03-17 RX ADMIN — PSEUDOEPHEDRINE HYDROCHLORIDE 60 MG: 60 TABLET ORAL at 17:19

## 2021-03-17 RX ADMIN — PSEUDOEPHEDRINE HYDROCHLORIDE 60 MG: 60 TABLET ORAL at 21:22

## 2021-03-17 RX ADMIN — HYDRALAZINE HYDROCHLORIDE 10 MG: 20 INJECTION, SOLUTION INTRAMUSCULAR; INTRAVENOUS at 05:34

## 2021-03-17 RX ADMIN — DOCUSATE SODIUM 100 MG: 100 CAPSULE, LIQUID FILLED ORAL at 08:38

## 2021-03-17 RX ADMIN — MAGNESIUM SULFATE HEPTAHYDRATE 2 G/HR: 40 INJECTION, SOLUTION INTRAVENOUS at 13:00

## 2021-03-17 RX ADMIN — MAGNESIUM SULFATE HEPTAHYDRATE 2 G: 40 INJECTION, SOLUTION INTRAVENOUS at 12:42

## 2021-03-17 RX ADMIN — PSEUDOEPHEDRINE HYDROCHLORIDE 60 MG: 60 TABLET ORAL at 14:00

## 2021-03-17 RX ADMIN — PERFLUTREN 0.4 ML/MIN: 6.52 INJECTION, SUSPENSION INTRAVENOUS at 15:35

## 2021-03-17 RX ADMIN — DOCUSATE SODIUM 100 MG: 100 CAPSULE, LIQUID FILLED ORAL at 17:53

## 2021-03-17 NOTE — PLAN OF CARE
Problem: PAIN - ADULT  Goal: Verbalizes/displays adequate comfort level or baseline comfort level  Description: Interventions:  - Encourage patient to monitor pain and request assistance  - Assess pain using appropriate pain scale  - Administer analgesics based on type and severity of pain and evaluate response  - Implement non-pharmacological measures as appropriate and evaluate response  - Consider cultural and social influences on pain and pain management  - Notify physician/advanced practitioner if interventions unsuccessful or patient reports new pain  Outcome: Progressing     Problem: INFECTION - ADULT  Goal: Absence or prevention of progression during hospitalization  Description: INTERVENTIONS:  - Assess and monitor for signs and symptoms of infection  - Monitor lab/diagnostic results  - Monitor all insertion sites, i e  indwelling lines, tubes, and drains  - Monitor endotracheal if appropriate and nasal secretions for changes in amount and color  - [place] appropriate cooling/warming therapies per order  - Administer medications as ordered  - Instruct and encourage patient and family to use good hand hygiene technique  - Identify and instruct in appropriate isolation precautions for identified infection/condition  Outcome: Progressing  Goal: Absence of fever/infection during neutropenic period  Description: INTERVENTIONS:  - Monitor WBC    Outcome: Progressing     Problem: SAFETY ADULT  Goal: Patient will remain free of falls  Description: INTERVENTIONS:  - Assess patient frequently for physical needs  -  Identify cognitive and physical deficits and behaviors that affect risk of falls    -  [place] fall precautions as indicated by assessment   - Educate patient/family on patient safety including physical limitations  - Instruct patient to call for assistance with activity based on assessment  - Modify environment to reduce risk of injury  - Consider OT/PT consult to assist with strengthening/mobility  Outcome: Progressing  Goal: Maintain or return to baseline ADL function  Description: INTERVENTIONS:  -  Assess patient's ability to carry out ADLs; assess patient's baseline for ADL function and identify physical deficits which impact ability to perform ADLs (bathing, care of mouth/teeth, toileting, grooming, dressing, etc )  - Assess/evaluate cause of self-care deficits   - Assess range of motion  - Assess patient's mobility; develop plan if impaired  - Assess patient's need for assistive devices and provide as appropriate  - Encourage maximum independence but intervene and supervise when necessary  - Involve family in performance of ADLs  - Assess for home care needs following discharge   - Consider OT consult to assist with ADL evaluation and planning for discharge  - Provide patient education as appropriate  Outcome: Progressing  Goal: Maintain or return mobility status to optimal level  Description: INTERVENTIONS:  - Assess patient's baseline mobility status (ambulation, transfers, stairs, etc )    - Identify cognitive and physical deficits and behaviors that affect mobility  - Identify mobility aids required to assist with transfers and/or ambulation (gait belt, sit-to-stand, lift, walker, cane, etc )  - [place] fall precautions as indicated by assessment  - Record patient progress and toleration of activity level on Mobility SBAR; progress patient to next Phase/Stage  - Instruct patient to call for assistance with activity based on assessment  - Consider rehabilitation consult to assist with strengthening/weightbearing, etc   Outcome: Progressing     Problem: DISCHARGE PLANNING  Goal: Discharge to home or other facility with appropriate resources  Description: INTERVENTIONS:  - Identify barriers to discharge w/patient and caregiver  - Arrange for needed discharge resources and transportation as appropriate  - Identify discharge learning needs (meds, wound care, etc )  - Arrange for interpretive services to assist at discharge as needed  - Refer to Case Management Department for coordinating discharge planning if the patient needs post-hospital services based on physician/advanced practitioner order or complex needs related to functional status, cognitive ability, or social support system  Outcome: Progressing     Problem: Knowledge Deficit  Goal: Patient/family/caregiver demonstrates understanding of disease process, treatment plan, medications, and discharge instructions  Description: Complete learning assessment and assess knowledge base    Interventions:  - Provide teaching at level of understanding  - Provide teaching via preferred learning methods  Outcome: Progressing

## 2021-03-17 NOTE — ASSESSMENT & PLAN NOTE
· POA with HR in 50s, lowest recorded 49  Patient asymptomatic  · Most recent EKG 3/17 with sinus bradycardia, no abnormalities or heart block noted  However, mobitz type 1 noted on admission EKG  · Lyme panel negative  TSH normal  Serial troponin negative  · Cardiology consulted, recommending avoidance of negative chronotropic and AV kaleigh blocking agents  · Continue to monitor

## 2021-03-17 NOTE — PHYSICAL THERAPY NOTE
Physical Therapy Cancellation Note- pt has hypertension, bradycardia, and likely pre-eclampsia  Pt not stable to eval  Pt was indep PTA, no assistive device  Will check tomorrow and probably screen for PT services   Aliza Grant, PT

## 2021-03-17 NOTE — ASSESSMENT & PLAN NOTE
Lab Results   Component Value Date    HGBA1C 5 7 08/28/2017       Recent Labs     03/16/21  1442   POCGLU 85       Blood Sugar Average: Last 72 hrs:  (P) 85     · Patient with history of gestational diabetes  Last A1C in 2017 5 7%  · Consider rechecking A1C   · BMI 37 6  Encourage weight reduction and lifestyle modification as patient at increased risk of developing DM

## 2021-03-17 NOTE — ASSESSMENT & PLAN NOTE
· UA with large amounts of leukocytosis  Urine culture pending  · On ceftriaxone Day #2  · Monitor temps and WBC

## 2021-03-17 NOTE — QUICK NOTE
Visited patient briefly to reassess symptoms  Continues to note some burning in her lower abdomen  Postpartum lochia is minimal, but did not it was more bright red earlier today  She is not having heavy bleeding  She denies subjective fever, chest pain, SOB, nausea, vomiting  She wishes she was home with her   She did have mild tenderness when I palpated her uterine fundus, but it was not impressive  Patient continues to remain afebrile since admission  She otherwise has no complaints  Will continue to monitor symptoms

## 2021-03-17 NOTE — ASSESSMENT & PLAN NOTE
· Improving  · Continue dicloxacillin per OBGYN recommendations  · May consider discontinuing Sudafed 2/2 hypertension and headache side effects?

## 2021-03-17 NOTE — OCCUPATIONAL THERAPY NOTE
Occupational Therapy Cancellation         Patient Name: Luda Hester  IDELJ'T Date: 3/17/2021    OT consult received  Per RN pt w/ HTN, bradycardia and not stable for OT evaluation  Will cancel and follow tomorrow as appropriate      Kelton Nolasco MS, OTR/L

## 2021-03-17 NOTE — PROGRESS NOTES
Gynecology Progress note   Vince Liang 22 y o  female MRN: [id number]  Unit/Bed#: E4 -01 Encounter: [id number]        A/P: 22 y o  with a history of chronic HTN, PPD#6 from an , now admitted with bradycardia with evidence of Mobitz type I block  Now with severe range blood pressures this morning that required treatment with hydralazine accompanied by headache  Given the new onset of severe range blood pressures that required treatment along with neurological features of a headache, picture is now concerning for evolving preeclampsia with severe features  Still no signs of fluid overload on physical exam     1  Chronic hypertension, possibly evolving superimposed preeclampsia with severe features  - Recommend magnesium titration for seizure prophylaxis, will discuss with primary team prior to entering orders  - Should consider starting an antihypertensive at this point to control blood pressures, will discuss with primary team the most appropriate option given evidence of heart block  - AM CBC and CMP pending    2  Bradycardia and chest pain  - Echocardiogram pending  - Continue workup per primary team    3  Mastitis  - patient reports she is much improved  - continue dicloxacillin and Sudafed to help decrease milk supply    4  Abdominal pain  - resolved, no suspicion for endometritis, patient remains afebrile    5  DVT ppx:   - Continue Lovenox 40 mg daily      Discussed with Dr Choco Saez  and Dr Rebecca Massey:    Patient states that after receiving Toradol in the ED yesterday, her headache resolved  She did not notice another headache until this morning at 5:00 AM when her blood pressure was very high  At that time, she was sleeping and resting comfortably  After being treated with the antihypertensive, she notes that the headache resolved  She denies any visual changes, or right upper quadrant/epigastric pain  Lochia within normal limits  Abdominal pain has now resolved  Patient states that her left breast feels significantly better  Patient reports feeling very overwhelmed today and is tearful given everything that is going on     /92 (BP Location: Left arm)   Pulse 57   Temp 97 6 °F (36 4 °C) (Temporal)   Resp 15   Wt 101 kg (223 lb 1 7 oz)   LMP 06/14/2020   SpO2 100%   BMI 38 30 kg/m²     No intake/output data recorded  No intake/output data recorded  Lab Results   Component Value Date    WBC 11 55 (H) 03/16/2021    HGB 12 0 03/16/2021    HCT 38 2 03/16/2021    MCV 97 03/16/2021     03/16/2021       Lab Results   Component Value Date    CALCIUM 9 4 03/16/2021    K 3 8 03/16/2021    CO2 27 03/16/2021     03/16/2021    BUN 10 03/16/2021    CREATININE 0 82 03/16/2021       Lab Results   Component Value Date/Time    POCGLU 85 03/16/2021 02:42 PM    POCGLU 93 03/11/2021 03:25 AM    POCGLU 75 03/11/2021 12:48 AM         Physical Exam  Constitutional:       General: She is not in acute distress  Appearance: She is not toxic-appearing or diaphoretic  Comments: Tearful   HENT:      Head: Normocephalic and atraumatic  Pulmonary:      Effort: Pulmonary effort is normal  No respiratory distress  Abdominal:      General: There is no distension  Palpations: Abdomen is soft  Tenderness: There is no abdominal tenderness  There is no guarding or rebound  Musculoskeletal:         General: No swelling or tenderness  Right lower leg: No edema  Left lower leg: No edema  Skin:     General: Skin is warm and dry  Neurological:      General: No focal deficit present  Mental Status: She is alert and oriented to person, place, and time  Mental status is at baseline  Psychiatric:         Mood and Affect: Mood normal          Thought Content:  Thought content normal          Judgment: Judgment normal                  Ro Valdes MD  3/17/2021  6:56 AM

## 2021-03-17 NOTE — PLAN OF CARE
Problem: PAIN - ADULT  Goal: Verbalizes/displays adequate comfort level or baseline comfort level  Description: Interventions:  - Encourage patient to monitor pain and request assistance  - Assess pain using appropriate pain scale  - Administer analgesics based on type and severity of pain and evaluate response  - Implement non-pharmacological measures as appropriate and evaluate response  - Consider cultural and social influences on pain and pain management  - Notify physician/advanced practitioner if interventions unsuccessful or patient reports new pain  Outcome: Progressing     Problem: INFECTION - ADULT  Goal: Absence or prevention of progression during hospitalization  Description: INTERVENTIONS:  - Assess and monitor for signs and symptoms of infection  - Monitor lab/diagnostic results  - Monitor all insertion sites, i e  indwelling lines, tubes, and drains  - Monitor endotracheal if appropriate and nasal secretions for changes in amount and color  - [place] appropriate cooling/warming therapies per order  - Administer medications as ordered  - Instruct and encourage patient and family to use good hand hygiene technique  - Identify and instruct in appropriate isolation precautions for identified infection/condition  Outcome: Progressing  Goal: Absence of fever/infection during neutropenic period  Description: INTERVENTIONS:  - Monitor WBC    Outcome: Progressing     Problem: SAFETY ADULT  Goal: Patient will remain free of falls  Description: INTERVENTIONS:  - Assess patient frequently for physical needs  -  Identify cognitive and physical deficits and behaviors that affect risk of falls    -  [place] fall precautions as indicated by assessment   - Educate patient/family on patient safety including physical limitations  - Instruct patient to call for assistance with activity based on assessment  - Modify environment to reduce risk of injury  - Consider OT/PT consult to assist with strengthening/mobility  Outcome: Progressing  Goal: Maintain or return to baseline ADL function  Description: INTERVENTIONS:  -  Assess patient's ability to carry out ADLs; assess patient's baseline for ADL function and identify physical deficits which impact ability to perform ADLs (bathing, care of mouth/teeth, toileting, grooming, dressing, etc )  - Assess/evaluate cause of self-care deficits   - Assess range of motion  - Assess patient's mobility; develop plan if impaired  - Assess patient's need for assistive devices and provide as appropriate  - Encourage maximum independence but intervene and supervise when necessary  - Involve family in performance of ADLs  - Assess for home care needs following discharge   - Consider OT consult to assist with ADL evaluation and planning for discharge  - Provide patient education as appropriate  Outcome: Progressing  Goal: Maintain or return mobility status to optimal level  Description: INTERVENTIONS:  - Assess patient's baseline mobility status (ambulation, transfers, stairs, etc )    - Identify cognitive and physical deficits and behaviors that affect mobility  - Identify mobility aids required to assist with transfers and/or ambulation (gait belt, sit-to-stand, lift, walker, cane, etc )  - [place] fall precautions as indicated by assessment  - Record patient progress and toleration of activity level on Mobility SBAR; progress patient to next Phase/Stage  - Instruct patient to call for assistance with activity based on assessment  - Consider rehabilitation consult to assist with strengthening/weightbearing, etc   Outcome: Progressing     Problem: DISCHARGE PLANNING  Goal: Discharge to home or other facility with appropriate resources  Description: INTERVENTIONS:  - Identify barriers to discharge w/patient and caregiver  - Arrange for needed discharge resources and transportation as appropriate  - Identify discharge learning needs (meds, wound care, etc )  - Arrange for interpretive services to assist at discharge as needed  - Refer to Case Management Department for coordinating discharge planning if the patient needs post-hospital services based on physician/advanced practitioner order or complex needs related to functional status, cognitive ability, or social support system  Outcome: Progressing     Problem: Knowledge Deficit  Goal: Patient/family/caregiver demonstrates understanding of disease process, treatment plan, medications, and discharge instructions  Description: Complete learning assessment and assess knowledge base    Interventions:  - Provide teaching at level of understanding  - Provide teaching via preferred learning methods  Outcome: Progressing

## 2021-03-17 NOTE — PLAN OF CARE
Problem: PAIN - ADULT  Goal: Verbalizes/displays adequate comfort level or baseline comfort level  Description: Interventions:  - Encourage patient to monitor pain and request assistance  - Assess pain using appropriate pain scale  - Administer analgesics based on type and severity of pain and evaluate response  - Implement non-pharmacological measures as appropriate and evaluate response  - Consider cultural and social influences on pain and pain management  - Notify physician/advanced practitioner if interventions unsuccessful or patient reports new pain  Outcome: Progressing     Problem: INFECTION - ADULT  Goal: Absence or prevention of progression during hospitalization  Description: INTERVENTIONS:  - Assess and monitor for signs and symptoms of infection  - Monitor lab/diagnostic results  - Monitor all insertion sites, i e  indwelling lines, tubes, and drains  - Monitor endotracheal if appropriate and nasal secretions for changes in amount and color  - [place] appropriate cooling/warming therapies per order  - Administer medications as ordered  - Instruct and encourage patient and family to use good hand hygiene technique  - Identify and instruct in appropriate isolation precautions for identified infection/condition  Outcome: Progressing  Goal: Absence of fever/infection during neutropenic period  Description: INTERVENTIONS:  - Monitor WBC    Outcome: Progressing     Problem: SAFETY ADULT  Goal: Patient will remain free of falls  Description: INTERVENTIONS:  - Assess patient frequently for physical needs  -  Identify cognitive and physical deficits and behaviors that affect risk of falls    -  [place] fall precautions as indicated by assessment   - Educate patient/family on patient safety including physical limitations  - Instruct patient to call for assistance with activity based on assessment  - Modify environment to reduce risk of injury  - Consider OT/PT consult to assist with strengthening/mobility  Outcome: Progressing  Goal: Maintain or return to baseline ADL function  Description: INTERVENTIONS:  -  Assess patient's ability to carry out ADLs; assess patient's baseline for ADL function and identify physical deficits which impact ability to perform ADLs (bathing, care of mouth/teeth, toileting, grooming, dressing, etc )  - Assess/evaluate cause of self-care deficits   - Assess range of motion  - Assess patient's mobility; develop plan if impaired  - Assess patient's need for assistive devices and provide as appropriate  - Encourage maximum independence but intervene and supervise when necessary  - Involve family in performance of ADLs  - Assess for home care needs following discharge   - Consider OT consult to assist with ADL evaluation and planning for discharge  - Provide patient education as appropriate  Outcome: Progressing  Goal: Maintain or return mobility status to optimal level  Description: INTERVENTIONS:  - Assess patient's baseline mobility status (ambulation, transfers, stairs, etc )    - Identify cognitive and physical deficits and behaviors that affect mobility  - Identify mobility aids required to assist with transfers and/or ambulation (gait belt, sit-to-stand, lift, walker, cane, etc )  - [place] fall precautions as indicated by assessment  - Record patient progress and toleration of activity level on Mobility SBAR; progress patient to next Phase/Stage  - Instruct patient to call for assistance with activity based on assessment  - Consider rehabilitation consult to assist with strengthening/weightbearing, etc   Outcome: Progressing     Problem: DISCHARGE PLANNING  Goal: Discharge to home or other facility with appropriate resources  Description: INTERVENTIONS:  - Identify barriers to discharge w/patient and caregiver  - Arrange for needed discharge resources and transportation as appropriate  - Identify discharge learning needs (meds, wound care, etc )  - Arrange for interpretive services to assist at discharge as needed  - Refer to Case Management Department for coordinating discharge planning if the patient needs post-hospital services based on physician/advanced practitioner order or complex needs related to functional status, cognitive ability, or social support system  Outcome: Progressing     Problem: Knowledge Deficit  Goal: Patient/family/caregiver demonstrates understanding of disease process, treatment plan, medications, and discharge instructions  Description: Complete learning assessment and assess knowledge base    Interventions:  - Provide teaching at level of understanding  - Provide teaching via preferred learning methods  Outcome: Progressing

## 2021-03-17 NOTE — ASSESSMENT & PLAN NOTE
· POA with hypertension, headache, and RUQ pain likely 2/2 superimposed pre-eclampsia  Patient given PRN hydralazine with minimal response for SBP 180s  · Protein/Creatinine ratio wdl  · Serial troponin negative  · Magnesium sulfate infusion for seizure prophylaxis started per OBGYN recommendations  · Will likely need antihypertensive agent as outpatient and cardiology follow up  · Continue to monitor blood pressures

## 2021-03-17 NOTE — UTILIZATION REVIEW
Initial Clinical Review  WAS OBSERVATION 3/16/21 @1601 CONVERTED TO INPATIENT ADMISSION 3/17/21 @1612 DUE TO CONTINUED STAY REQUIRED TO CARE FOR POST PARTUM PATIENT WITH SIGNS OF ECLAMPSIA  IV MG IN PROGRESS AND PATIENT TRANSFERRED TO LEVEL 1 ICU STEPDOWN  Admission: Date/Time/Statement:   Admission Orders (From admission, onward)     Ordered        03/17/21 1612  Inpatient Admission  Once         03/16/21 1601  Place in Observation  Once                   Orders Placed This Encounter   Procedures    Inpatient Admission     Standing Status:   Standing     Number of Occurrences:   1     Order Specific Question:   Level of Care     Answer:   WAS IN MED SURG, TRANSFERRED TO LEVEL 1 STEPDOWN Level 1 Stepdown [13]     Order Specific Question:   Estimated length of stay     Answer:   More than 2 Midnights     Order Specific Question:   Certification     Answer:   I certify that inpatient services are medically necessary for this patient for a duration of greater than two midnights  See H&P and MD Progress Notes for additional information about the patient's course of treatment  ED Arrival Information     Expected Arrival Acuity Means of Arrival Escorted By Service Admission Type    - 3/16/2021 13:59 Emergent Walk-In Family Member Hospitalist Emergency    Arrival Complaint    palpitation, headache        Chief Complaint   Patient presents with    Chest Pain     Pt reports increased BP, swollen feet, chest palpitations, and HA   5 days postpartum  Assessment/Plan: 23 yo fem w/gestational dm,  5 days post partum s/p vaginal delivery to ED from home admitted under observation due to mastitis, bradycardia, and UTI  Presented with heart palpitations, urinary frequency, dysuria, and breast tenderness of unknown duration  C/o 4 days of waxing/waning abd pain, and 2 days of waxing/waning headache  CXR nothing acute  EKG type 1 cleo  OB/GYN and cardio consulted  IV antbx, cardiac workup, lyme titer in progress  3/16 per cardio: atypical chest pain, asymptomatic mobitz type 1 sinus mariza; keep on tele to watch for high degree av block, check tsh, lyme; avoid node blockers; probnp is elevated but chest clear and has no significant edema  Check echo and serial trops  Her mobitz 1 could be related to significant abdominal discomfort  3/16 per OB: she has chronic htn without severe pre-ecclampsia  Treat mastitis with antbx  She does not wish to continue breast feeding so will suppress lactation with cold compresses/compression/sudafed/benadryl/ibuprofen  Consider postpartum endometritis if develops fundal tenderness, fever, leukocytosis, or foul smelling discharge  3/17: per OB: last night c/o continued abdominal burning  Minimal lochia but less bright red  No fever  Mild tenderness to fundal palpation  Today, c/o epigastric discomfort after eating with mild hand edema  Breasts less painful, headache had resolved, but recurred late morning for which tylenol was given  Exam reveals epigastric/RUQ tenderness, tearful  Has features of chronic HTN with superimposed pre-eclampsia with severe features  Give mg sulfate for seizure prophy, check on dosing in setting of heart block  Will do straight cath for p/c ratio and recheck cbc, cmp  Mastitis is markedly improved  Per cardio: denied recurrent chest pain  To move to the OB floor for mg infusion to reduce risk of eclamptic seizure  Avoid negative chronotropic and AV node blockers  Waiting on echo  3/17 PM Update: ordered for transfer to level 1 ICU stepdown @1003  On mg gtt  Transferred into ICU bed @1132  CONVERTED TO INPATIENT ADMISSION as noted above  Around 9pm started with headache with tylenol given  High probability of imminent or life threatening deterioration due to hypertension and preeclampsia  3/18: INPT Day 2: headache resolved with tylenol and prior antihypertensive treatment  didn't sleep well, but feeling better, still on mg infusion   Has general swelling  Continues on PO antbx and sudafed for mastitis with improvement, IV rocephin for UTI  Rechecking labs  Hypermagnesemic while on the gtt  bp stable 120-130's / 70-80's  No signs of preeclampsia  Echo wnl  abd pain has resolved, no suspicion for endometritis  Afebrile  Feels very overwhelmed and tearful with her situation       ED Triage Vitals [03/16/21 1406]   Temperature Pulse Respirations Blood Pressure SpO2   98 8 °F (37 1 °C) (!) 52 18 146/67 100 %      Temp Source Heart Rate Source Patient Position - Orthostatic VS BP Location FiO2 (%)   Oral Monitor Sitting Right arm --      Pain Score       6          Wt Readings from Last 1 Encounters:   03/18/21 98 1 kg (216 lb 4 3 oz)     Additional Vital Signs:   Date/Time  Temp  Pulse  Resp  BP  MAP (mmHg)  SpO2  O2 Device    03/18/21 0814  96 6 °F (35 9 °C)Abnormal   --  --  --  --  --  --    03/18/21 0800  --  64  21  140/81  104  99 %  --    03/18/21 0700  --  58  20  145/79  103  97 %  --    03/18/21 0600  --  60  20  137/74  102  97 %  --    03/18/21 0500  --  58  18  139/71  99  97 %  --    03/18/21 0400  98 2 °F (36 8 °C)  56  14  137/75  107  97 %  --    03/18/21 0300  --  56  21  138/76  105  97 %  --    03/18/21 0251  --  64  22  133/81  103  98 %  --    03/18/21 0200  --  66  20  --  --  97 %  --    03/18/21 0030  98 6 °F (37 °C)  62  21  124/72  91  98 %  None (Room air)    03/18/21 0018  --  58  28Abnormal   124/72  91  97 %  None (Room air)    03/18/21 0000  --  58  15  --  --  97 %  --    03/17/21 2212  --  67  21  134/79  96  99 %  None (Room air)    03/17/21 2200  --  60  19  --  --  97 %  --    03/17/21 2100  --  60  16  141/83  105  98 %  --    03/17/21 2000  97 6 °F (36 4 °C)  68  20  142/78  95  98 %  --    03/17/21 1930  --  70  26Abnormal   133/76  101  99 %  --    03/17/21 1900  --  74  21  143/77  101  98 %  --    03/17/21 1800  --  74  21  137/76  97  99 %  None (Room air)    03/17/21 1710  --  84  28Abnormal   131/71  89  99 % None (Room air)    03/17/21 1600  --  90  25Abnormal   119/53  77  98 %  None (Room air)        Date/Time  Temp  Pulse  Resp  BP  MAP (mmHg)  SpO2  O2 Device    03/17/21 1530  97 6 °F (36 4 °C)  94  34Abnormal   149/79  105  99 %  --    03/17/21 1400  --  88  33Abnormal   --  --  99 %  None (Room air)    03/17/21 1300  --  68  22  121/83  99  98 %  --    03/17/21 1245  --  72  16  141/82  102  98 %  --    03/17/21 1230  --  64  24Abnormal   138/84  102  98 %  None (Room air)    03/17/21 1200  --  66  11Abnormal   --  --  98 %  None (Room air)    03/17/21 1133  --  70  14  --  --  99 %  None (Room air)    03/17/21 1026  --  58  --  147/80  --  --  --        Date/Time  Temp  Pulse  Resp  BP  MAP (mmHg)  SpO2  O2 Device    03/17/21 0819  98 3 °F (36 8 °C)  61  20  151/95  --  99 %  None (Room air)    03/17/21 0609  --  57  --  147/92  --  --  --    03/17/21 0500  97 6 °F (36 4 °C)  60  15  182/96Abnormal   --  100 %  None (Room air)    03/16/21 2227  97 7 °F (36 5 °C)  57  16  139/84  --  100 %  None (Room air)    03/16/21 1915  98 °F (36 7 °C)  66  16  134/81  --  98 %  None (Room air)    03/16/21 1830  --  --  --  --  --  --  None (Room air)    03/16/21 1730  98 1 °F (36 7 °C)  63  18  142/94  --  100 %  None (Room air)    03/16/21 1630  --  56  18  168/87  122  100 %  None (Room air)    03/16/21 1525  --  49Abnormal   18  154/74  --  99 %  None (Room air)        Pertinent Labs/Diagnostic Test Results:     3/16 PCXR: nothing acute  3/16: EKG:  nsr  3/16 EKG#2  Possible sinus pause versus Mobitz type 1 heart block    3/17 echo: LEFT VENTRICLE:Systolic function was hyperdynamic by visual assessment  Ejection fraction was estimated to be 75 %  There were no regional wall motion abnormalities    ATRIAL SEPTUM:No definite patent foramen ovale was identified with suboptimal color Doppler    TRICUSPID VALVE:There was trace regurgitation    PULMONIC VALVE:There was trace regurgitation      Results from last 7 days   Lab Units 03/18/21  0625 03/17/21 2133 03/17/21  1628 03/17/21  0834 03/16/21  1438   WBC Thousand/uL 8 01 8 29 8 32 9 68 11 55*   HEMOGLOBIN g/dL 12 4 11 0* 12 5 12 6 12 0   HEMATOCRIT % 37 9 34 4* 38 6 39 1 38 2   PLATELETS Thousands/uL 355 335 374 359 345   NEUTROS ABS Thousands/µL 6 21  --   --   --  9 78*         Results from last 7 days   Lab Units 03/18/21 0625 03/17/21 2133 03/17/21  1628 03/17/21  0834 03/16/21  1438   SODIUM mmol/L 140 144 143 144 144   POTASSIUM mmol/L 3 9 3 5 3 4* 3 9 3 8   CHLORIDE mmol/L 106 109* 105 107 107   CO2 mmol/L 24 25 27 26 27   ANION GAP mmol/L 10 10 11 11 10   BUN mg/dL 9 10 9 10 10   CREATININE mg/dL 0 89 0 90 1 12 0 99 0 82   EGFR ml/min/1 73sq m 90 89 68 79 100   CALCIUM mg/dL 8 0* 7 3* 8 5 9 0 9 4   MAGNESIUM mg/dL 4 4* 4 1* 5 0*  --   --      Results from last 7 days   Lab Units 03/18/21 0625 03/17/21 2133 03/17/21 1628 03/17/21  0834 03/16/21  1438   AST U/L 25 26 31 46* 35   ALT U/L 59 55 64 70 64   ALK PHOS U/L 89 82 94 98 99   TOTAL PROTEIN g/dL 6 6 6 1* 6 9 7 3 6 8   ALBUMIN g/dL 2 5* 2 2* 2 6* 2 7* 2 6*   TOTAL BILIRUBIN mg/dL 0 16* 0 11* 0 13* 0 23 0 18*     Results from last 7 days   Lab Units 03/16/21  1442   POC GLUCOSE mg/dl 85     Results from last 7 days   Lab Units 03/18/21 0625 03/17/21 2133 03/17/21 1628 03/17/21  0834 03/16/21  1438 03/12/21  0510 03/11/21  0753   GLUCOSE RANDOM mg/dL 101 88 120 129 95 114 95     Results from last 7 days   Lab Units 03/16/21 2223 03/16/21  1918 03/16/21  1438   TROPONIN I ng/mL <0 02 <0 02 <0 02         Results from last 7 days   Lab Units 03/16/21  1438   PROTIME seconds 13 0   INR  1 00   PTT seconds 26     Results from last 7 days   Lab Units 03/16/21  1438   TSH 3RD GENERATON uIU/mL 2 755  2 755       Results from last 7 days   Lab Units 03/16/21  1438   NT-PRO BNP pg/mL 703*     Results from last 7 days   Lab Units 03/16/21  1438   LIPASE u/L 76     Results from last 7 days   Lab Units 03/17/21  0924 03/16/21  1455 03/11/21  0753   CLARITY UA   --  Slightly Cloudy  --    COLOR UA   --  Orange  --    SPEC GRAV UA   --  1 015  --    PH UA   --  6 5  --    GLUCOSE UA mg/dl  --  Negative  --    KETONES UA mg/dl  --  Negative  --    BLOOD UA   --  Large*  --    PROTEIN UA mg/dl  --  Negative  --    NITRITE UA   --  Negative  --    BILIRUBIN UA   --  Negative  --    UROBILINOGEN UA E U /dl  --  0 2  --    LEUKOCYTES UA   --  Large*  --    WBC UA /hpf  --  Innumerable*  --    RBC UA /hpf  --  Innumerable*  --    BACTERIA UA /hpf  --  Innumerable*  --    EPITHELIAL CELLS WET PREP /hpf  --  Occasional  --    CREATININE UR mg/dL 58 7  --  119 3   PROTEIN UR mg/dL <6  --  15   PROT/CREAT RATIO UR  <0 10  --  0 13*         Results from last 7 days   Lab Units 03/16/21  1705 03/16/21  1635 03/16/21  1455   BLOOD CULTURE  No Growth at 24 hrs   No Growth at 24 hrs   --    URINE CULTURE   --   --  Culture too young- will reincubate     ED Treatment:   Medication Administration from 03/16/2021 1359 to 03/16/2021 1729       Date/Time Order Dose Route Action     03/16/2021 1513 ketorolac (TORADOL) injection 15 mg 15 mg Intravenous Given     03/16/2021 1514 aluminum-magnesium hydroxide-simethicone (MYLANTA) oral suspension 30 mL 30 mL Oral Given     03/16/2021 1514 Lidocaine Viscous HCl (XYLOCAINE) 2 % mucosal solution 15 mL 15 mL Swish & Swallow Given     03/16/2021 1513 famotidine (PEPCID) injection 20 mg 20 mg Intravenous Given     03/16/2021 1706 ceftriaxone (ROCEPHIN) 1 g/50 mL in dextrose IVPB 1,000 mg Intravenous New Bag        Past Medical History:   Diagnosis Date    Chronic hypertension affecting pregnancy 11/25/2020    Disease of thyroid gland     Gestational diabetes mellitus (GDM) in third trimester 1/11/2021    Hypothyroid     Seasonal allergies     Urinary tract infection     Varicella        Admitting Diagnosis: Palpitations [R00 2]  Bradycardia [R00 1]  Mastitis [N61 0]  UTI (urinary tract infection) [N39 0]  Chest pain [R07 9]  Abdominal pain [R10 9]  Age/Sex: 22 y o  female  Admission Orders:  Scheduled Medications:  dicloxacillin, 500 mg, Oral, Q6H [facility] & half-way  docusate sodium, 100 mg, Oral, BID  enoxaparin, 40 mg, Subcutaneous, Q24H [facility] & half-way  levothyroxine, 88 mcg, Oral, Early Morning  pseudoephedrine, 60 mg, Oral, Q8H [facility] & half-way  saccharomyces boulardii, 250 mg, Oral, BID    IV mg 4gm x 1 3/17  IV mg 2gm x 1 3/17  IV hydralazine x 1 3/17 @0534  PO kdur x 1 3/17    Continuous IV Infusions:magnesium sulfate 20 g/500 mL infusion (premix)   Rate: 50 mL/hr Dose: 2 g/hr  Freq: Continuous Route: IV  Start: 03/17/21 0930     PRN Meds:  acetaminophen, 650 mg, Oral, Q6H PRN x 2 3/17 @1027, 2117  calcium carbonate, 500 mg, Oral, Daily PRN  ondansetron, 4 mg, Intravenous, Q6H PRN        IP CONSULT TO OB GYN  IP CONSULT TO CARDIOLOGY    Network Utilization Review Department  ATTENTION: Please call with any questions or concerns to [phone] and carefully listen to the prompts so that you are directed to the right person  All voicemails are confidential   Melissa Xavier all requests for admission clinical reviews, approved or denied determinations and any other requests to dedicated fax number below belonging to the campus where the patient is receiving treatment   List of dedicated fax numbers for the Facilities:  [address] [place] [address] DENIALS (Administrative/Medical Necessity) [phone]   [address] (Maternity/NICU/Pediatrics) [phone]   [address] [phone]   1200 [facility] [address] [place] [address] Yoselin Mandujano 2137 (Yvette Boyce "Sandra" 103) [address] [address] [place] [address] [facility] [phone]

## 2021-03-17 NOTE — ASSESSMENT & PLAN NOTE
· POA, pt presented with reports of "palpitations"  · Cardiology consulted  · Mobitz type1 noted on EKG  · Continue tele  · Avoid notal blocking agents  · ECHO pending  · NT-BNP-Pro 703

## 2021-03-17 NOTE — PROGRESS NOTES
[facility]  Transfer Accept Note - Rikki Jung 1995, 22 y o  female MRN: [id number]  Unit/Bed#: ICU 12 Encounter: [id number]  Primary Care Provider: Heather Bell DO   Date and time admitted to hospital: 3/16/2021  2:02 PM    Chronic hypertension with superimposed pre-eclampsia  Assessment & Plan  · Pt presented with HA, HTN, RUQ pain, concern for pre-eclampsia with severe features  · Spontaneous Vaginal Delivery on 3/11  · OBGYN following  · Pt received hydralazine on floor for SBP in the 180's  · Patient transferred to [facility] for Mag infusion for seizure prevention in setting of   · Protein/Cr level nl  · Monitor CMP       * Bradycardia  Assessment & Plan  · POA, pt presented with reports of "palpitations"  · Cardiology consulted  · Mobitz type1 noted on EKG  · Continue tele  · Avoid notal blocking agents  · ECHO pending  · NT-BNP-Pro 703    UTI (urinary tract infection)  Assessment & Plan  · Urine culture pending  · Pt on Ceftriaxone D# 2  · Monitor temp and wbc trend    Mastitis  Assessment & Plan  · Improving  · OBGYN following  · Continue dicloxaccillin        -------------------------------------------------------------------------------------------------------------  Chief Complaint: Headache resolved, abdominal pain resolved  Patient feeling much better at this time  History of Present Illness   HX and PE limited by: N/A  Rikki Jung is a 22 y o  female with history of chronic hypertension and gestational diabetes s/p  on 3/11/2021 who presents to ED with c/o headache and RUQ abdominal pain  SBP in 180s with concern for superimposed pre-eclampsia with severe events  Patient given PRN hydralazine with minimal response  EKG on admission showed Mobitz type I heart block, cardiology consulted for antihypertensive agent recommendations  Patient also found to have leukocytosis in UA and started on IV Ceftriaxone Day #2/3    Per OBGYN recommendations, patient to start on Magnesium sulfate infusion for seizure prophylaxis requiring transfer to ICU for further management  History obtained from the patient   -------------------------------------------------------------------------------------------------------------  Dispo: Admit to Stepdown Level 1    Code Status: Level 1 - Full Code  --------------------------------------------------------------------------------------------------------------  Review of Systems   All other systems reviewed and are negative  A 12-point, complete review of systems was reviewed and negative except as stated above     Physical Exam  Constitutional:       General: She is not in acute distress  Appearance: Normal appearance  She is not toxic-appearing or diaphoretic  HENT:      Head: Normocephalic and atraumatic  Eyes:      General:         Right eye: No discharge  Left eye: No discharge  Pupils: Pupils are equal, round, and reactive to light  Cardiovascular:      Rate and Rhythm: Normal rate and regular rhythm  Pulses:           Radial pulses are 2+ on the right side and 2+ on the left side  Dorsalis pedis pulses are 2+ on the right side and 2+ on the left side  Heart sounds: Normal heart sounds  Pulmonary:      Effort: Pulmonary effort is normal       Breath sounds: Normal breath sounds and air entry  No wheezing or rhonchi  Genitourinary:     Comments: Pt reports light drainage on pads  Skin:     General: Skin is warm  Capillary Refill: Capillary refill takes less than 2 seconds  Nails: There is no clubbing  Neurological:      General: No focal deficit present  Mental Status: She is alert  GCS: GCS eye subscore is 4  GCS verbal subscore is 5   GCS motor subscore is 6        --------------------------------------------------------------------------------------------------------------  Vitals:   Vitals:    03/17/21 1245 03/17/21 1300 03/17/21 1400 03/17/21 1435 BP: 141/82 121/83     BP Location:       Pulse: 72 68 88    Resp: 16 22 (!) 33    Temp:    97 6 °F (36 4 °C)   TempSrc:    Temporal   SpO2: 98% 98% 99%    Weight:         Temp  Min: 97 6 °F (36 4 °C)  Max: 98 8 °F (37 1 °C)  IBW: -92 5 kg     Body mass index is 37 61 kg/m²  Laboratory and Diagnostics:  Results from last 7 days   Lab Units 03/17/21  0834 03/16/21  1438 03/11/21  0753 03/11/21  0322   WBC Thousand/uL 9 68 11 55* 14 83* 13 66*   HEMOGLOBIN g/dL 12 6 12 0 12 3 12 8   HEMATOCRIT % 39 1 38 2 38 4 38 9   PLATELETS Thousands/uL 359 345 323 353   NEUTROS PCT %  --  85*  --   --    MONOS PCT %  --  5  --   --      Results from last 7 days   Lab Units 03/17/21  0834 03/16/21  1438 03/12/21  0510 03/11/21  0753   SODIUM mmol/L 144 144 138 137   POTASSIUM mmol/L 3 9 3 8 3 8 4 2   CHLORIDE mmol/L 107 107 106 102   CO2 mmol/L 26 27 24 26   ANION GAP mmol/L 11 10 8 9   BUN mg/dL 10 10 10 12   CREATININE mg/dL 0 99 0 82 0 87 0 73   CALCIUM mg/dL 9 0 9 4 8 2* 9 0   GLUCOSE RANDOM mg/dL 129 95 114 95   ALT U/L 70 64 76 104*   AST U/L 46* 35 43 61*   ALK PHOS U/L 98 99 93 115   ALBUMIN g/dL 2 7* 2 6* 2 1* 2 6*   TOTAL BILIRUBIN mg/dL 0 23 0 18* 0 11* 0 23          Results from last 7 days   Lab Units 03/16/21  1438   INR  1 00   PTT seconds 26      Results from last 7 days   Lab Units 03/16/21  2223 03/16/21  1918 03/16/21  1438   TROPONIN I ng/mL <0 02 <0 02 <0 02         ABG:    VBG:          Micro:  Results from last 7 days   Lab Units 03/16/21  1705 03/16/21  1635   BLOOD CULTURE  Received in Microbiology Lab  Culture in Progress  Received in Microbiology Lab  Culture in Progress  EKG: Sinus bradycardia  Imaging: I have personally reviewed pertinent reports          Historical Information   Past Medical History:   Diagnosis Date    Chronic hypertension affecting pregnancy 11/25/2020    Disease of thyroid gland     Gestational diabetes mellitus (GDM) in third trimester 1/11/2021    Hypothyroid     Seasonal allergies     Urinary tract infection     Varicella      Past Surgical History:   Procedure Laterality Date    KNEE CARTILAGE SURGERY Right     WISDOM TOOTH EXTRACTION      WRIST SURGERY Left      Social History   Social History     Substance and Sexual Activity   Alcohol Use Not Currently    Comment: rarely prior to pregnancy     Social History     Substance and Sexual Activity   Drug Use Never     Social History     Tobacco Use   Smoking Status Former Smoker    Packs/day: 1 00    Years: 4 00    Pack years: 4 00    Types: Cigarettes    Quit date: 2016    Years since quittin 2   Smokeless Tobacco Never Used     Exercise History: None  Family History:   Family History   Problem Relation Age of Onset    No Known Problems Mother     Hyperthyroidism Father     Pancreatic cancer Maternal Grandmother     Heart attack Maternal Grandfather     No Known Problems Paternal Grandmother     Kidney failure Paternal Grandfather     Breast cancer Neg Hx     Colon cancer Neg Hx     Ovarian cancer Neg Hx      I have reviewed this patient's family history and commented on sigificant items within the HPI      Medications:  Current Facility-Administered Medications   Medication Dose Route Frequency    [MAR Hold] acetaminophen (TYLENOL) tablet 650 mg  650 mg Oral Q6H PRN    [MAR Hold] calcium carbonate (TUMS) chewable tablet 500 mg  500 mg Oral Daily PRN    [MAR Hold] dicloxacillin (DYNAPEN) capsule 500 mg  500 mg Oral Q6H [address]    [MAR Hold] docusate sodium (COLACE) capsule 100 mg  100 mg Oral BID    [MAR Hold] enoxaparin (LOVENOX) subcutaneous injection 40 mg  40 mg Subcutaneous Q24H [address]    [MAR Hold] levothyroxine tablet 88 mcg  88 mcg Oral Early Morning    magnesium sulfate 20 g/500 mL infusion (premix)  1 g/hr Intravenous Continuous    [MAR Hold] ondansetron (ZOFRAN) injection 4 mg  4 mg Intravenous Q6H PRN    [MAR Hold] pseudoephedrine (SUDAFED) tablet 60 mg  60 mg Oral Q8H [address]    [facility] Hold] saccharomyces boulardii (FLORASTOR) capsule 250 mg  250 mg Oral BID     Home medications:  Prior to Admission Medications   Prescriptions Last Dose Informant Patient Reported? Taking? Accu-Chek Softclix Lancets lancets Not Taking at Unknown time Self No No   Sig: Use 4 a day or as instructed   Patient not taking: Reported on 3/16/2021   Blood Glucose Monitoring Suppl (Accu-Chek Guide) w/Device KIT Not Taking at Unknown time Self No No   Sig: Use 4 (four) times a day   Patient not taking: Reported on 3/16/2021   Blood Glucose Monitoring Suppl (OneTouch Verio Flex System) w/Device KIT Not Taking at Unknown time Self No No   Sig: Test 4 times per day fasting and 2 hours after the start of each meal   Patient not taking: Reported on 5/59/9383   OneTouch Delica Lancets 61Z MISC Not Taking at Unknown time Self No No   Sig: Test 4 times per day fasting and 2 hours after the start of each meal   Patient not taking: Reported on 3/16/2021   Prenatal Vit-Fe Fumarate-FA (PRENATAL COMPLETE PO) Not Taking at Unknown time Self Yes No   Sig: Take 1 tablet by mouth daily  hydrocortisone 1 % cream Not Taking at Unknown time  No No   Sig: Apply 1 application topically 4 (four) times a day as needed for irritation   Patient not taking: Reported on 3/16/2021   ibuprofen (MOTRIN) 600 mg tablet 3/16/2021 at Unknown time  No Yes   Sig: Take 1 tablet (600 mg total) by mouth every 6 (six) hours as needed (cramping)   levothyroxine 88 mcg tablet 3/16/2021 at Unknown time Self Yes Yes   Sig: Take 88 mcg by mouth daily   witch hazel-glycerin (TUCKS) topical pad Not Taking at Unknown time  No No   Sig: Apply 1 pad topically every 4 (four) hours as needed for irritation or hemorrhoids   Patient not taking: Reported on 3/16/2021      Facility-Administered Medications: None     Allergies:   Allergies   Allergen Reactions    Pollen Extract ------------------------------------------------------------------------------------------------------------  Advance Directive and Living Will:      Power of :    POLST:    ------------------------------------------------------------------------------------------------------------  Care Time Delivered:   No Critical Care time spent       STEPHANIE Hermosillo        Portions of the record may have been created with voice recognition software  Occasional wrong word or "sound a like" substitutions may have occurred due to the inherent limitations of voice recognition software    Read the chart carefully and recognize, using context, where substitutions have occurred

## 2021-03-17 NOTE — PROGRESS NOTES
Progress Note - Cardiology   Amando Don [facility] 22 y o  female MRN: [id number]  Unit/Bed#: E4 -[place] Encounter: [id number]    Assessment:   1  atypical precordial chest pain  2  Postpartum from spontaneous vaginal delivery March 11, 2021  3  Sinus bradycardia with Mobitz 1 block, asymptomatic  4  Gestational hypertension  5  Gestational diabetes  6  Acute urinary tract infection  7  Mastitis  8  Family history of CAD and heart failure    Plan:   1  Obstetrics to move her to the  obstetrical floor for for a magnesium infusion to reduce the risk of a eclamptic seizure  2  Avoid negative chronotropic and AV node kaleigh blocking drugs   3  Waiting for echocardiogram      Interval history:   patient very tearful but denies recurrence of chest discomfort  Blood pressure ranged from 134/81 to 182/96 over last 24 hours      Vitals: /95 (BP Location: Right arm)   Pulse 61   Temp 98 3 °F (36 8 °C) (Temporal)   Resp 20   Wt 100 kg (221 lb 1 oz)   LMP 06/14/2020   SpO2 99%   BMI 37 95 kg/m²   Vitals:    03/16/21 1406 03/17/21 0600   Weight: 101 kg (223 lb 1 7 oz) 100 kg (221 lb 1 oz)     Orthostatic Blood Pressures      Most Recent Value   Blood Pressure  151/95 filed at 03/17/2021 5153   Patient Position - Orthostatic VS  Sitting filed at 03/17/2021 0674          No intake or output data in the 24 hours ending 03/17/21 0938    Invasive Devices     Peripheral Intravenous Line            Peripheral IV 03/16/21 Right Antecubital less than 1 day                Review of Systems   Respiratory: Negative for cough, choking, chest tightness, shortness of breath and wheezing  Cardiovascular: Negative for chest pain, palpitations and leg swelling  Musculoskeletal: Negative for gait problem  Skin: Negative for rash  Neurological: Negative for dizziness, tremors, syncope, weakness, light-headedness, numbness and headaches  Psychiatric/Behavioral: Negative for agitation and behavioral problems   The patient is not hyperactive  Patient tearful and emotional       Physical Exam  Constitutional:       General: She is not in acute distress  Appearance: She is well-developed  HENT:      Head: Normocephalic and atraumatic  Neck:      Thyroid: No thyromegaly  Vascular: No carotid bruit or JVD  Cardiovascular:      Rate and Rhythm: Normal rate and regular rhythm  Heart sounds: Murmur present  No friction rub  No gallop  Comments: Grade 2/6 soft systolic murmur in the 2nd interspace left of the sternum  Pulmonary:      Effort: No respiratory distress  Breath sounds: No wheezing, rhonchi or rales  Abdominal:      General: Bowel sounds are normal       Palpations: Abdomen is soft  Musculoskeletal:      Right lower leg: No edema  Left lower leg: No edema  Skin:     General: Skin is warm and dry  Neurological:      General: No focal deficit present  Mental Status: She is alert and oriented to person, place, and time  Psychiatric:         Behavior: Behavior normal       Comments: Patient tearful and emotional           ======================================================    Lab Results   Component Value Date    WBC 9 68 03/17/2021    HGB 12 6 03/17/2021    HCT 39 1 03/17/2021    MCV 96 03/17/2021     03/17/2021      Lab Results   Component Value Date    SODIUM 144 03/17/2021    K 3 9 03/17/2021     03/17/2021    CO2 26 03/17/2021    BUN 10 03/17/2021    CREATININE 0 99 03/17/2021    GLUC 129 03/17/2021    CALCIUM 9 0 03/17/2021      Lab Results   Component Value Date    HGBA1C 5 7 08/28/2017      Lab Results   Component Value Date    INR 1 00 03/16/2021    PROTIME 13 0 03/16/2021        Imaging:   I have personally reviewed pertinent reports          EKG: Sinus bradycardia with Mobitz 1 second-degree AV block, asymptomatic

## 2021-03-17 NOTE — ASSESSMENT & PLAN NOTE
· Pt presented with HA, HTN, RUQ pain, concern for pre-eclampsia with severe features  · Spontaneous Vaginal Delivery on 3/11  · OBGYN following  · Pt received hydralazine on floor for SBP in the 180's  · Patient transferred to SD1 for Mag infusion for seizure prevention in setting of   · Protein/Cr level nl  · Monitor CMP

## 2021-03-17 NOTE — ASSESSMENT & PLAN NOTE
· Troponin negative  · No evidence of fluid overload  CXR negative for pulmonary congestion  · ECHO pending, cardiology following

## 2021-03-18 VITALS
HEART RATE: 78 BPM | SYSTOLIC BLOOD PRESSURE: 128 MMHG | BODY MASS INDEX: 37.12 KG/M2 | TEMPERATURE: 97.1 F | WEIGHT: 216.27 LBS | OXYGEN SATURATION: 99 % | RESPIRATION RATE: 21 BRPM | DIASTOLIC BLOOD PRESSURE: 71 MMHG

## 2021-03-18 PROBLEM — O10.919 CHRONIC HYPERTENSION AFFECTING PREGNANCY: Status: RESOLVED | Noted: 2020-11-25 | Resolved: 2021-03-18

## 2021-03-18 PROBLEM — O24.410 DIET CONTROLLED GESTATIONAL DIABETES MELLITUS (GDM) IN THIRD TRIMESTER: Status: RESOLVED | Noted: 2021-01-11 | Resolved: 2021-03-18

## 2021-03-18 LAB
ALBUMIN SERPL BCP-MCNC: 2.5 G/DL (ref 3.5–5)
ALP SERPL-CCNC: 89 U/L (ref 46–116)
ALT SERPL W P-5'-P-CCNC: 59 U/L (ref 12–78)
ANION GAP SERPL CALCULATED.3IONS-SCNC: 10 MMOL/L (ref 4–13)
AST SERPL W P-5'-P-CCNC: 25 U/L (ref 5–45)
BACTERIA UR CULT: NORMAL
BASOPHILS # BLD AUTO: 0.06 THOUSANDS/ΜL (ref 0–0.1)
BASOPHILS NFR BLD AUTO: 1 % (ref 0–1)
BILIRUB SERPL-MCNC: 0.16 MG/DL (ref 0.2–1)
BUN SERPL-MCNC: 9 MG/DL (ref 5–25)
CALCIUM ALBUM COR SERPL-MCNC: 9.2 MG/DL (ref 8.3–10.1)
CALCIUM SERPL-MCNC: 8 MG/DL (ref 8.3–10.1)
CHLORIDE SERPL-SCNC: 106 MMOL/L (ref 100–108)
CO2 SERPL-SCNC: 24 MMOL/L (ref 21–32)
CREAT SERPL-MCNC: 0.89 MG/DL (ref 0.6–1.3)
EOSINOPHIL # BLD AUTO: 0.26 THOUSAND/ΜL (ref 0–0.61)
EOSINOPHIL NFR BLD AUTO: 3 % (ref 0–6)
ERYTHROCYTE [DISTWIDTH] IN BLOOD BY AUTOMATED COUNT: 13.2 % (ref 11.6–15.1)
GFR SERPL CREATININE-BSD FRML MDRD: 90 ML/MIN/1.73SQ M
GLUCOSE SERPL-MCNC: 101 MG/DL (ref 65–140)
HCT VFR BLD AUTO: 37.9 % (ref 34.8–46.1)
HGB BLD-MCNC: 12.4 G/DL (ref 11.5–15.4)
IMM GRANULOCYTES # BLD AUTO: 0.03 THOUSAND/UL (ref 0–0.2)
IMM GRANULOCYTES NFR BLD AUTO: 0 % (ref 0–2)
LYMPHOCYTES # BLD AUTO: 0.86 THOUSANDS/ΜL (ref 0.6–4.47)
LYMPHOCYTES NFR BLD AUTO: 11 % (ref 14–44)
MAGNESIUM SERPL-MCNC: 4.4 MG/DL (ref 1.6–2.6)
MCH RBC QN AUTO: 31.5 PG (ref 26.8–34.3)
MCHC RBC AUTO-ENTMCNC: 32.7 G/DL (ref 31.4–37.4)
MCV RBC AUTO: 96 FL (ref 82–98)
MONOCYTES # BLD AUTO: 0.59 THOUSAND/ΜL (ref 0.17–1.22)
MONOCYTES NFR BLD AUTO: 7 % (ref 4–12)
NEUTROPHILS # BLD AUTO: 6.21 THOUSANDS/ΜL (ref 1.85–7.62)
NEUTS SEG NFR BLD AUTO: 78 % (ref 43–75)
NRBC BLD AUTO-RTO: 0 /100 WBCS
PLATELET # BLD AUTO: 355 THOUSANDS/UL (ref 149–390)
PMV BLD AUTO: 9.6 FL (ref 8.9–12.7)
POTASSIUM SERPL-SCNC: 3.9 MMOL/L (ref 3.5–5.3)
PROT SERPL-MCNC: 6.6 G/DL (ref 6.4–8.2)
RBC # BLD AUTO: 3.94 MILLION/UL (ref 3.81–5.12)
SODIUM SERPL-SCNC: 140 MMOL/L (ref 136–145)
WBC # BLD AUTO: 8.01 THOUSAND/UL (ref 4.31–10.16)

## 2021-03-18 PROCEDURE — 99232 SBSQ HOSP IP/OBS MODERATE 35: CPT | Performed by: INTERNAL MEDICINE

## 2021-03-18 PROCEDURE — NC001 PR NO CHARGE: Performed by: NURSE PRACTITIONER

## 2021-03-18 PROCEDURE — 85025 COMPLETE CBC W/AUTO DIFF WBC: CPT | Performed by: INTERNAL MEDICINE

## 2021-03-18 PROCEDURE — 99232 SBSQ HOSP IP/OBS MODERATE 35: CPT | Performed by: OBSTETRICS & GYNECOLOGY

## 2021-03-18 PROCEDURE — 83735 ASSAY OF MAGNESIUM: CPT | Performed by: OBSTETRICS & GYNECOLOGY

## 2021-03-18 PROCEDURE — 99231 SBSQ HOSP IP/OBS SF/LOW 25: CPT | Performed by: STUDENT IN AN ORGANIZED HEALTH CARE EDUCATION/TRAINING PROGRAM

## 2021-03-18 PROCEDURE — 80053 COMPREHEN METABOLIC PANEL: CPT | Performed by: OBSTETRICS & GYNECOLOGY

## 2021-03-18 PROCEDURE — 99238 HOSP IP/OBS DSCHRG MGMT 30/<: CPT | Performed by: INTERNAL MEDICINE

## 2021-03-18 PROCEDURE — NC001 PR NO CHARGE: Performed by: OBSTETRICS & GYNECOLOGY

## 2021-03-18 RX ORDER — CALCIUM CARBONATE 200(500)MG
500 TABLET,CHEWABLE ORAL DAILY PRN
Refills: 0
Start: 2021-03-18 | End: 2021-06-30 | Stop reason: ALTCHOICE

## 2021-03-18 RX ORDER — ACETAMINOPHEN 325 MG/1
650 TABLET ORAL EVERY 6 HOURS PRN
Refills: 0
Start: 2021-03-18 | End: 2021-06-30 | Stop reason: ALTCHOICE

## 2021-03-18 RX ORDER — PSEUDOEPHEDRINE HCL 60 MG/1
60 TABLET ORAL EVERY 8 HOURS SCHEDULED
Qty: 30 TABLET | Refills: 0 | Status: SHIPPED | OUTPATIENT
Start: 2021-03-18 | End: 2021-03-19

## 2021-03-18 RX ORDER — DOCUSATE SODIUM 100 MG/1
100 CAPSULE, LIQUID FILLED ORAL 2 TIMES DAILY
Qty: 10 CAPSULE | Refills: 0 | Status: SHIPPED | OUTPATIENT
Start: 2021-03-18 | End: 2021-06-30 | Stop reason: ALTCHOICE

## 2021-03-18 RX ORDER — SACCHAROMYCES BOULARDII 250 MG
250 CAPSULE ORAL 2 TIMES DAILY
Refills: 0
Start: 2021-03-18

## 2021-03-18 RX ADMIN — DICLOXACILLIN SODIUM 500 MG: 250 CAPSULE ORAL at 11:20

## 2021-03-18 RX ADMIN — PSEUDOEPHEDRINE HYDROCHLORIDE 60 MG: 60 TABLET ORAL at 05:26

## 2021-03-18 RX ADMIN — LEVOTHYROXINE SODIUM 88 MCG: 88 TABLET ORAL at 05:26

## 2021-03-18 RX ADMIN — DICLOXACILLIN SODIUM 500 MG: 250 CAPSULE ORAL at 05:27

## 2021-03-18 RX ADMIN — ENOXAPARIN SODIUM 40 MG: 40 INJECTION SUBCUTANEOUS at 08:07

## 2021-03-18 RX ADMIN — DOCUSATE SODIUM 100 MG: 100 CAPSULE, LIQUID FILLED ORAL at 08:06

## 2021-03-18 RX ADMIN — DICLOXACILLIN SODIUM 500 MG: 250 CAPSULE ORAL at 00:31

## 2021-03-18 RX ADMIN — Medication 250 MG: at 08:06

## 2021-03-18 NOTE — ASSESSMENT & PLAN NOTE
· Pt presented with HA, HTN, RUQ pain, concern for pre-eclampsia with severe features  · Spontaneous Vaginal Delivery on 3/11  · OBGYN following  · Pt received hydralazine on floor for SBP in the 180's  · Patient transferred to [facility] for Mag infusion for seizure prevention   · Protein/Cr level nl  · Monitor CMP

## 2021-03-18 NOTE — DISCHARGE INSTR - AVS FIRST PAGE
Dear Linda July,     It was our pleasure to care for you here at [facility]  It is our hope that we were always able to exceed the expected standards for your care during your stay  You were hospitalized due to   You were cared for in the ICU byChris Geller-Sayed, * who covers for your primary care physician (PCP), Cooper Roy, DO, while you were hospitalized  If you have any questions or concerns related to this hospitalization, you may contact us at [phone]  For follow up as well as any medication refills, we recommend that you follow up with your primary care physician  A registered nurse will reach out to you by phone within a few days after your discharge to answer any additional questions that you may have after going home  However, at this time we provide for you here, the most important instructions / recommendations at discharge:     · Notable Medication Adjustments -   · none  · Testing Required after Discharge -   · none  · Important follow up information -   · Please set up an appointment with your primary care doctor within 1 week  · Other Instructions -   · You have a nurse visit tomorrow for blood pressure checks  · Please take antibiotic as prescribed for mastitis  · Please review this entire after visit summary as additional general instructions including medication list, appointments, activity, diet, any pertinent wound care, and other additional recommendations from your care team that may be provided for you

## 2021-03-18 NOTE — PROGRESS NOTES
Progress Note - Cardiology   Anselmo Perrin 22 y o  female MRN: [id number]  Unit/Bed#: ICU 12 Encounter: [id number]    Assessment:  1  Mild hypertension but not severe enough to prevent discharge  2  Postpartum from spontaneous vaginal delivery March 11, 2021  3  Gestational hypertension improving  4  Gestational diabetes  5  Atypical chest discomfort resolved  6  Palpitations resolved  7  Urinary tract infection  8  Mastitis  9  Family history of coronary artery disease and heart failure   10  Functional heart murmur related to increased cardiac output in pregnancy  No pathological murmur    Plan:  1  From a cardiac perspective, patient may be discharged  2  Follow-up with primary care physician for blood pressure management  3  If primary care physician has difficulty controlling her blood pressure, I would be glad to see her but routine follow-up with cardiology not necessary         Interval history:  Patient feels good  She has had no chest pain  She denies palpitations  Blood pressure since midnight has ranged from 124/72 to 145/79  No further evidence of second-degree AV block on monitor  The echocardiogram demonstrates a hyperdynamic left ventricular function with no pathological findings        Vitals: /81   Pulse 64   Temp (!) 96 6 °F (35 9 °C) (Temporal)   Resp 21   Wt 98 1 kg (216 lb 4 3 oz)   LMP 06/14/2020   SpO2 99%   BMI 37 12 kg/m²   Vitals:    03/17/21 1242 03/18/21 0600   Weight: 99 4 kg (219 lb 2 2 oz) 98 1 kg (216 lb 4 3 oz)     Orthostatic Blood Pressures      Most Recent Value   Blood Pressure  140/81 filed at 03/18/2021 0800   Patient Position - Orthostatic VS  Lying filed at 03/18/2021 0030            Intake/Output Summary (Last 24 hours) at 3/18/2021 0909  Last data filed at 3/18/2021 0600  Gross per 24 hour   Intake 1265 ml   Output 1500 ml   Net -235 ml       Invasive Devices     Peripheral Intravenous Line            Peripheral IV 03/16/21 Right Antecubital 1 day Peripheral IV 03/17/21 Left;Proximal;Ventral (anterior) Antecubital less than 1 day                Review of Systems   Respiratory: Negative for cough, choking, chest tightness, shortness of breath and wheezing  Cardiovascular: Negative for chest pain, palpitations and leg swelling  Musculoskeletal: Negative for gait problem  Skin: Negative for rash  Neurological: Negative for dizziness, tremors, syncope, weakness, light-headedness, numbness and headaches  Psychiatric/Behavioral: Negative for agitation and behavioral problems  The patient is not hyperactive  Physical Exam  Constitutional:       General: She is not in acute distress  Appearance: She is well-developed  HENT:      Head: Normocephalic and atraumatic  Neck:      Thyroid: No thyromegaly  Vascular: No carotid bruit or JVD  Cardiovascular:      Rate and Rhythm: Normal rate and regular rhythm  Heart sounds: Normal heart sounds  No murmur  No friction rub  No gallop  Pulmonary:      Effort: No respiratory distress  Breath sounds: No wheezing, rhonchi or rales  Abdominal:      General: Bowel sounds are normal       Palpations: Abdomen is soft  Musculoskeletal:      Right lower leg: No edema  Left lower leg: No edema  Skin:     General: Skin is warm and dry  Neurological:      General: No focal deficit present  Mental Status: She is alert and oriented to person, place, and time  Psychiatric:         Mood and Affect: Mood normal          Behavior: Behavior normal          Thought Content: Thought content normal          Judgment: Judgment normal          --------------------------------------------------------------------------------  ECHO:   Results for orders placed during the hospital encounter of 03/16/21   Echo complete with contrast if indicated    Narrative [facility]  Michaelazyoseph Ramos [address] [place] [address], [address] [facility]  [phone]    Transthoracic Echocardiogram  2D, M-mode, Doppler, and Color Doppler    Study date:  17-Mar-2021    Patient: Cherelle Kasper  MR number: [de-identified]  Account number: [de-identified]  : 1995  Age: 22 years  Gender: Female  Status: Inpatient  Location: Intensive Care Unit (ICU)  Height: 64 in  Weight: 222 6 lb  BP: 14/ 92 mmHg    Indications: cardiac murmur  Diagnoses: R01 1 - Cardiac murmur, unspecified    Sonographer:  Jennifer George RDCS  Primary Physician:  Bartolo Bautista DO  Referring Physician:  Blossom Almendarez Do  Group:  [facility]  Interpreting Physician:  MORENO Manzano     SUMMARY    LEFT VENTRICLE:  Systolic function was hyperdynamic by visual assessment  Ejection fraction was estimated to be 75 %  There were no regional wall motion abnormalities  ATRIAL SEPTUM:  No definite patent foramen ovale was identified with suboptimal color Doppler  TRICUSPID VALVE:  There was trace regurgitation  PULMONIC VALVE:  There was trace regurgitation  SUMMARY MEASUREMENTS  2D measurements:  Unspecified Anatomy:   %FS was 43 7 %  Ao Diam was 2 7 cm   EDV(Teich) was 93 1 ml   EF(Teich) was 75 %  ESV(Teich) was 23 2 ml  IVSd was 0 8 cm  LA Diam was 4 4 cm  LAAs A2C was 18 3 cm2  LAAs A4C was 19 cm2  LAESV A-L A2C was 53 1  ml  LAESV A-L A4C was 57 8 ml  LAESV Index (A-L) was 27 1 ml/m2  LAESV MOD A2C was 51 ml  LAESV MOD A4C was 54 6 ml  LAESV(A-L) was 55 6 ml  LAESV(MOD BP) was 52 9 ml  LAESVInd MOD BP was 25 8 ml/m2  LALs A2C was 5 4 cm  LALs A4C  was 5 3 cm  LVEDV MOD A4C was 82 2 ml  LVEF MOD A4C was 73 8 %  LVESV MOD A4C was 21 5 ml  LVIDd was 4 5 cm  LVIDs was 2 5 cm  LVLd A4C was 8 cm  LVLs A4C was 6 7 cm  LVOT Diam was 1 9 cm  LVPWd was 0 8 cm  RAAs A4C was 13 8 cm2  RAESV A-L was 33 2 ml  RAESV MOD was 32 8 ml  RALs was 4 8 cm  RVIDd was 1 8 cm  RWT was 0 3   SV MOD A4C was 60 6 ml   SV(Teich) was 69 8 ml   CW measurements:  Unspecified Anatomy:   AV Env  Ti was 304 5 ms   AV VTI was 30 4 cm  AV Vmax was 1 5 m/s  AV Vmean was 1 m/s  AV maxPG was 8 8 mmHg  AV meanPG was 4 7 mmHg  PV Env  Ti was 279 7 ms  PV VTI was 32 2 cm  PV Vmax was 1 6 m/s  PV Vmean  was 1 2 m/s  PV maxPG was 10 1 mmHg  PV meanPG was 5 8 mmHg  TR Vmax was 1 5 m/s   TR maxPG was 8 6 mmHg  MM measurements:  Unspecified Anatomy:   TAPSE was 2 8 cm  PW measurements:  Unspecified Anatomy:   VIPUL (VTI) was 2 4 cm2  VIPUL Vmax was 2 5 cm2  AVAI (VTI) was 0 cm2/m2  AVAI Vmax was 0 cm2/m2  DVI was 0 9   E' Avg was 0 1 m/s  E' Lat was 0 2 m/s  E' Sept was 0 1 m/s  E/E' Avg was 6 5   E/E' Lat was 4 9   E/E' Sept was 9 6   LVOT Env  Ti was 283 5 ms  LVOT VTI was 26 1 cm  LVOT Vmax was 1 3 m/s  LVOT Vmean was 0 9 m/s  LVOT maxPG was 7 2 mmHg  LVOT meanPG was 3 8 mmHg  LVSI Dopp was 35 5 ml/m2  LVSV Dopp was 72 8 ml   MV A Angel was  0 7 m/s  MV Dec Placer was 5 5 m/s2  MV DecT was 163 3 ms   MV E Angel was 0 9 m/s  MV E/A Ratio was 1 3   MV PHT was 47 4 ms  MVA By PHT was 4 6 cm2  RVOT Env  Ti was 248 3 ms  RVOT VTI was 24 4 cm  RVOT Vmax was 1 6 m/s  RVOT Vmean  was 1 m/s  RVOT maxPG was 9 9 mmHg  RVOT meanPG was 4 7 mmHg  TV E' lat was 0 2 m/s  HISTORY: PRIOR HISTORY: postpartum, hypothyroidism, diabetes mellitus  No prior echocardiogram on record  PROCEDURE: The study was performed in the Intensive Care Unit (ICU)  This was a routine study  The transthoracic approach was used  The study included complete 2D imaging, M-mode, complete spectral Doppler, and color Doppler  The heart  rate was 74 bpm, at the start of the study  Intravenous contrast ( 0 6ml Definity in nss) was administered  Intravenous contrast was administered to opacify the left ventricle  Echocardiographic views were limited due to decreased  penetration  This was a technically difficult study  LEFT VENTRICLE: Size was normal  Systolic function was hyperdynamic by visual assessment   Ejection fraction was estimated to be 75 %  There were no regional wall motion abnormalities  Wall thickness was normal  DOPPLER: Left ventricular  diastolic function parameters were normal     RIGHT VENTRICLE: The size was normal  Systolic function was normal  Wall thickness was normal     LEFT ATRIUM: Size was normal     ATRIAL SEPTUM: No definite patent foramen ovale was identified with suboptimal color Doppler  RIGHT ATRIUM: Size was normal     MITRAL VALVE: Valve structure was normal  There was normal leaflet separation  DOPPLER: The transmitral velocity was within the normal range  There was no evidence for stenosis  There was no regurgitation  AORTIC VALVE: The valve was trileaflet  Leaflets exhibited normal thickness and normal cuspal separation  DOPPLER: Transaortic velocity was within the normal range  There was no evidence for stenosis  There was no regurgitation  TRICUSPID VALVE: The valve structure was normal  There was normal leaflet separation  DOPPLER: The transtricuspid velocity was within the normal range  There was no evidence for stenosis  There was trace regurgitation  PULMONIC VALVE: Leaflets exhibited normal thickness, no calcification, and normal cuspal separation  DOPPLER: The transpulmonic velocity was within the normal range  There was trace regurgitation  PERICARDIUM: There was no pericardial effusion  The pericardium was normal in appearance  AORTA: The root exhibited normal size  SYSTEMIC VEINS: IVC: The inferior vena cava was normal in size and course   Respirophasic changes were normal     SYSTEM MEASUREMENT TABLES    2D  %FS: 43 7 %  Ao Diam: 2 7 cm  EDV(Teich): 93 1 ml  EF(Teich): 75 %  ESV(Teich): 23 2 ml  IVSd: 0 8 cm  LA Diam: 4 4 cm  LAAs A2C: 18 3 cm2  LAAs A4C: 19 cm2  LAESV A-L A2C: 53 1 ml  LAESV A-L A4C: 57 8 ml  LAESV Index (A-L): 27 1 ml/m2  LAESV MOD A2C: 51 ml  LAESV MOD A4C: 54 6 ml  LAESV(A-L): 55 6 ml  LAESV(MOD BP): 52 9 ml  LAESVInd MOD BP: 25 8 ml/m2  LALs A2C: 5 4 cm  LALs A4C: 5 3 cm  LVEDV MOD A4C: 82 2 ml  LVEF MOD A4C: 73 8 %  LVESV MOD A4C: 21 5 ml  LVIDd: 4 5 cm  LVIDs: 2 5 cm  LVLd A4C: 8 cm  LVLs A4C: 6 7 cm  LVOT Diam: 1 9 cm  LVPWd: 0 8 cm  RAAs A4C: 13 8 cm2  RAESV A-L: 33 2 ml  RAESV MOD: 32 8 ml  RALs: 4 8 cm  RVIDd: 1 8 cm  RWT: 0 3  SV MOD A4C: 60 6 ml  SV(Teich): 69 8 ml    CW  AV Env  Ti: 304 5 ms  AV VTI: 30 4 cm  AV Vmax: 1 5 m/s  AV Vmean: 1 m/s  AV maxP 8 mmHg  AV meanP 7 mmHg  PV Env  Ti: 279 7 ms  PV VTI: 32 2 cm  PV Vmax: 1 6 m/s  PV Vmean: 1 2 m/s  PV maxPG: 10 1 mmHg  PV meanP 8 mmHg  TR Vmax: 1 5 m/s  TR maxP 6 mmHg    MM  TAPSE: 2 8 cm    PW  VIPUL (VTI): 2 4 cm2  VIPUL Vmax: 2 5 cm2  AVAI (VTI): 0 cm2/m2  AVAI Vmax: 0 cm2/m2  DVI: 0 9  E' Av 1 m/s  E' Lat: 0 2 m/s  E' Sept: 0 1 m/s  E/E' Av 5  E/E' Lat: 4 9  E/E' Sept: 9 6  LVOT Env  Ti: 283 5 ms  LVOT VTI: 26 1 cm  LVOT Vmax: 1 3 m/s  LVOT Vmean: 0 9 m/s  LVOT maxP 2 mmHg  LVOT meanPG: 3 8 mmHg  LVSI Dopp: 35 5 ml/m2  LVSV Dopp: 72 8 ml  MV A Angel: 0 7 m/s  MV Dec [place]: 5 5 m/s2  MV DecT: 163 3 ms  MV E Angel: 0 9 m/s  MV E/A Ratio: 1 3  MV PHT: 47 4 ms  MVA By PHT: 4 6 cm2  RVOT Env  Ti: 248 3 ms  RVOT VTI: 24 4 cm  RVOT Vmax: 1 6 m/s  RVOT Vmean: 1 m/s  RVOT maxP 9 mmHg  RVOT meanP 7 mmHg  TV E' lat: 0 2 m/s    Inters\[facility]\"" Commission Accredited Echocardiography Laboratory    Prepared and electronically signed by    MORENO Galeano    Signed 17-Mar-2021 16:16:48         ======================================================    Lab Results   Component Value Date    WBC 8 01 2021    HGB 12 4 2021    HCT 37 9 2021    MCV 96 2021     2021      Lab Results   Component Value Date    SODIUM 140 2021    K 3 9 2021     2021    CO2 24 2021    BUN 9 2021    CREATININE 0 89 2021    GLUC 101 2021    CALCIUM 8 0 (L) 2021      Lab Results   Component Value Date    HGBA1C 5 7 2017     Lab Results   Component Value Date    INR 1 00 03/16/2021    PROTIME 13 0 03/16/2021        Imaging:   I have personally reviewed pertinent reports          EKG:  Sinus rhythm

## 2021-03-18 NOTE — PROGRESS NOTES
Gynecology Progress note   Jossie Crespo 22 y o  female MRN: [id number]  Unit/Bed#: ICU 12 Encounter: [id number]           A/P: 22 y o  with a history of chronic HTN, PPD#7 from an , admitted with bradycardia with evidence of Mobitz type I block  Doing well today, Magnesium was started yday for concerns for superimposed pre-eclampsia with severe features, she had one headache overnight but this has completely resolved with tylenol  Her HR is in the 70s and her left breast mastitis is improving with ABx treatment       From an obstetric standpoint no concern for pre-eclampsia residual symptoms or blood pressures today  No need for Mag  Likely no need for tele (pending cards recommendation), possible discharge home, ultimate dispo per primary team     1  Chronic hypertension with superimposed preeclampsia with severe features  - D/c Mag, BP have been stable 120-130s/70-80s, no signs or Sx of pre-eclampsia     2  Bradycardia and chest pain  - Echocardiogram wnl, f/u with cardiology  - Continue workup per primary team     3  Mastitis  - patient reports she is much improved  - continue dicloxacillin and Sudafed to help decrease milk supply     4  Abdominal pain  - resolved, no suspicion for endometritis, patient remains afebrile     5  DVT ppx:   - Continue Lovenox 40 mg daily        Discussed with Dr Wes Sinclair and Dr Syed Neal           Subjective:     Patient states that after receiving tylenol overnight her headache has completely resolved, she feels great and has no complaints      /74   Pulse 60   Temp 98 2 °F (36 8 °C) (Temporal)   Resp 20   Wt 98 1 kg (216 lb 4 3 oz)   LMP 2020   SpO2 97%   BMI 37 12 kg/m²      I/O last 3 completed shifts: In: 1962 [P O :840;  I V :275; IV Piggyback:150]  Out: 1500 [Urine:1500]  No intake/output data recorded            Lab Results   Component Value Date     WBC 8 01 2021     HGB 12 4 2021     HCT 37 9 2021     MCV 96 2021      03/18/2021               Lab Results   Component Value Date     CALCIUM 8 0 (L) 03/18/2021     K 3 9 03/18/2021     CO2 24 03/18/2021      03/18/2021     BUN 9 03/18/2021     CREATININE 0 89 03/18/2021               Lab Results   Component Value Date/Time     POCGLU 85 03/16/2021 02:42 PM     POCGLU 93 03/11/2021 03:25 AM     POCGLU 75 03/11/2021 12:48 AM            Physical Exam  Constitutional:       General: She is not in acute distress  Appearance: She is not toxic-appearing or diaphoretic  HENT:      Head: Normocephalic and atraumatic  Pulmonary:      Effort: Pulmonary effort is normal  No respiratory distress  Abdominal:      General: There is no distension  Palpations: Abdomen is soft  Tenderness: There is no abdominal tenderness  There is no guarding or rebound  Musculoskeletal:         General: No swelling or tenderness  Right lower leg: No edema  Left lower leg: No edema  Skin:     General: Skin is warm and dry  Neurological:      General: No focal deficit present  Mental Status: She is alert and oriented to person, place, and time  Mental status is at baseline  Psychiatric:         Mood and Affect: Mood normal          Thought Content:  Thought content normal          Judgment: Judgment normal          Maureen Trimble DO  PGY-4 OB/GYN  3/18/2021 10:41 AM

## 2021-03-18 NOTE — DISCHARGE SUMMARY
Discharge Summary - Tomer Valdovinos 22 y o  female MRN: [id number]    Unit/Bed#: ICU 12 Encounter: [id number]    Admission Date:   Admission Orders (From admission, onward)     Ordered        03/17/21 1612  Inpatient Admission  Once         03/16/21 1601  Place in Observation  Once                     Admitting Diagnosis: Palpitations [R00 2]  Bradycardia [R00 1]  Mastitis [N61 0]  Chest pain [R07 9]  Abdominal pain [R10 9]    HPI:   Per Dr Cherie Mckeon, " Tomer Valdovinos is a 22 y o  female who presents with bradycardia, urinary frequency and mastitis  The patient was recently postpartum 5 days from spontaneous vaginal delivery  She presented to the emergency room with a chief complaint of heart palpitations  She has no known cardiac history  She does have a family history of coronary artery disease with an uncle who passed away at age 25  She denies any chest pain  She does report having urinary frequency and dysuria  Also with breast tenderness "    Procedures Performed:   Orders Placed This Encounter   Procedures    ED ECG Documentation Only       Summary of Hospital Course:  Admitted to the hospital and evaluated by the cardiology service for her palpitations  EKG showed sinus bradycardia with the 1 Mobitz type 1  She was monitored on telemetry  She did have an elevated NT proBNP however no significant edema and chest x-ray was clear  Cardiac enzymes were trended and negative  The echocardiogram showed ejection fraction of 75%, no regional wall motion abnormalities and no significant valvular abnormalities  During the patient's hospitalization the patient was noted to have some hypertension with headache , epigastric pain after eating and mild edema  The patient was evaluated by the obstetric steam and she was initiated on a magnesium infusion for seizure prophylaxis in the setting of chronic hypertension superimposed with preeclampsia with severe features    The patient was transferred to the step-down unit for infusion of magnesium IV  At this time, the patient denies HA of abdominal discomfort  BP has been 124//88 for the past 24 hours without mediations  The patient has been cleared for discharge from hospital by [facility] and Cardiology  Pt should follow up with OB per instructions for BP check and with PCP  There was concern for UTI, however UA negative for nitrates, pt received ceftriaxone x 1, urine cultures not finalized at this time    Significant Findings, Care, Treatment and Services Provided:   ECHO: EF 05% no RWMA    Complications:   None    Discharge Diagnosis:    Principal Problem:    Bradycardia  Active Problems:    Chronic hypertension with superimposed pre-eclampsia    Mastitis    Gestational diabetes    Elevated brain natriuretic peptide (BNP) level  Resolved Problems:    * No resolved hospital problems  *      Condition at Discharge: good         Discharge instructions/Information to patient and family:   See after visit summary for information provided to patient and family  Provisions for Follow-Up Care:  See after visit summary for information related to follow-up care and any pertinent home health orders  PCP: Joan Cortés DO    Disposition: Home    Planned Readmission: No      Discharge Statement   I spent 20 minutes discharging the patient  This time was spent on the day of discharge  I had direct contact with the patient on the day of discharge  Additional documentation is required if more than 30 minutes were spent on discharge  Discharge Medications:  See after visit summary for reconciled discharge medications provided to patient and family

## 2021-03-18 NOTE — PLAN OF CARE
Problem: PAIN - ADULT  Goal: Verbalizes/displays adequate comfort level or baseline comfort level  Description: Interventions:  - Encourage patient to monitor pain and request assistance  - Assess pain using appropriate pain scale  - Administer analgesics based on type and severity of pain and evaluate response  - Implement non-pharmacological measures as appropriate and evaluate response  - Consider cultural and social influences on pain and pain management  - Notify physician/advanced practitioner if interventions unsuccessful or patient reports new pain  Outcome: Progressing     Problem: INFECTION - ADULT  Goal: Absence or prevention of progression during hospitalization  Description: INTERVENTIONS:  - Assess and monitor for signs and symptoms of infection  - Monitor lab/diagnostic results  - Monitor all insertion sites, i e  indwelling lines, tubes, and drains  - Monitor endotracheal if appropriate and nasal secretions for changes in amount and color  - [place] appropriate cooling/warming therapies per order  - Administer medications as ordered  - Instruct and encourage patient and family to use good hand hygiene technique  - Identify and instruct in appropriate isolation precautions for identified infection/condition  Outcome: Progressing  Goal: Absence of fever/infection during neutropenic period  Description: INTERVENTIONS:  - Monitor WBC    Outcome: Progressing     Problem: SAFETY ADULT  Goal: Patient will remain free of falls  Description: INTERVENTIONS:  - Assess patient frequently for physical needs  -  Identify cognitive and physical deficits and behaviors that affect risk of falls    -  [place] fall precautions as indicated by assessment   - Educate patient/family on patient safety including physical limitations  - Instruct patient to call for assistance with activity based on assessment  - Modify environment to reduce risk of injury  - Consider OT/PT consult to assist with strengthening/mobility  Outcome: Progressing  Goal: Maintain or return to baseline ADL function  Description: INTERVENTIONS:  -  Assess patient's ability to carry out ADLs; assess patient's baseline for ADL function and identify physical deficits which impact ability to perform ADLs (bathing, care of mouth/teeth, toileting, grooming, dressing, etc )  - Assess/evaluate cause of self-care deficits   - Assess range of motion  - Assess patient's mobility; develop plan if impaired  - Assess patient's need for assistive devices and provide as appropriate  - Encourage maximum independence but intervene and supervise when necessary  - Involve family in performance of ADLs  - Assess for home care needs following discharge   - Consider OT consult to assist with ADL evaluation and planning for discharge  - Provide patient education as appropriate  Outcome: Progressing  Goal: Maintain or return mobility status to optimal level  Description: INTERVENTIONS:  - Assess patient's baseline mobility status (ambulation, transfers, stairs, etc )    - Identify cognitive and physical deficits and behaviors that affect mobility  - Identify mobility aids required to assist with transfers and/or ambulation (gait belt, sit-to-stand, lift, walker, cane, etc )  - [place] fall precautions as indicated by assessment  - Record patient progress and toleration of activity level on Mobility SBAR; progress patient to next Phase/Stage  - Instruct patient to call for assistance with activity based on assessment  - Consider rehabilitation consult to assist with strengthening/weightbearing, etc   Outcome: Progressing

## 2021-03-18 NOTE — PLAN OF CARE
Problem: PAIN - ADULT  Goal: Verbalizes/displays adequate comfort level or baseline comfort level  Description: Interventions:  - Encourage patient to monitor pain and request assistance  - Assess pain using appropriate pain scale  - Administer analgesics based on type and severity of pain and evaluate response  - Implement non-pharmacological measures as appropriate and evaluate response  - Consider cultural and social influences on pain and pain management  - Notify physician/advanced practitioner if interventions unsuccessful or patient reports new pain  Outcome: Progressing     Problem: INFECTION - ADULT  Goal: Absence or prevention of progression during hospitalization  Description: INTERVENTIONS:  - Assess and monitor for signs and symptoms of infection  - Monitor lab/diagnostic results  - Monitor all insertion sites, i e  indwelling lines, tubes, and drains  - Monitor endotracheal if appropriate and nasal secretions for changes in amount and color  - [place] appropriate cooling/warming therapies per order  - Administer medications as ordered  - Instruct and encourage patient and family to use good hand hygiene technique  - Identify and instruct in appropriate isolation precautions for identified infection/condition  Outcome: Progressing  Goal: Absence of fever/infection during neutropenic period  Description: INTERVENTIONS:  - Monitor WBC    Outcome: Progressing     Problem: SAFETY ADULT  Goal: Patient will remain free of falls  Description: INTERVENTIONS:  - Assess patient frequently for physical needs  -  Identify cognitive and physical deficits and behaviors that affect risk of falls    -  [place] fall precautions as indicated by assessment   - Educate patient/family on patient safety including physical limitations  - Instruct patient to call for assistance with activity based on assessment  - Modify environment to reduce risk of injury  - Consider OT/PT consult to assist with strengthening/mobility  Outcome: Progressing  Goal: Maintain or return to baseline ADL function  Description: INTERVENTIONS:  -  Assess patient's ability to carry out ADLs; assess patient's baseline for ADL function and identify physical deficits which impact ability to perform ADLs (bathing, care of mouth/teeth, toileting, grooming, dressing, etc )  - Assess/evaluate cause of self-care deficits   - Assess range of motion  - Assess patient's mobility; develop plan if impaired  - Assess patient's need for assistive devices and provide as appropriate  - Encourage maximum independence but intervene and supervise when necessary  - Involve family in performance of ADLs  - Assess for home care needs following discharge   - Consider OT consult to assist with ADL evaluation and planning for discharge  - Provide patient education as appropriate  Outcome: Progressing  Goal: Maintain or return mobility status to optimal level  Description: INTERVENTIONS:  - Assess patient's baseline mobility status (ambulation, transfers, stairs, etc )    - Identify cognitive and physical deficits and behaviors that affect mobility  - Identify mobility aids required to assist with transfers and/or ambulation (gait belt, sit-to-stand, lift, walker, cane, etc )  - [place] fall precautions as indicated by assessment  - Record patient progress and toleration of activity level on Mobility SBAR; progress patient to next Phase/Stage  - Instruct patient to call for assistance with activity based on assessment  - Consider rehabilitation consult to assist with strengthening/weightbearing, etc   Outcome: Progressing     Problem: DISCHARGE PLANNING  Goal: Discharge to home or other facility with appropriate resources  Description: INTERVENTIONS:  - Identify barriers to discharge w/patient and caregiver  - Arrange for needed discharge resources and transportation as appropriate  - Identify discharge learning needs (meds, wound care, etc )  - Arrange for interpretive services to assist at discharge as needed  - Refer to Case Management Department for coordinating discharge planning if the patient needs post-hospital services based on physician/advanced practitioner order or complex needs related to functional status, cognitive ability, or social support system  Outcome: Progressing     Problem: Knowledge Deficit  Goal: Patient/family/caregiver demonstrates understanding of disease process, treatment plan, medications, and discharge instructions  Description: Complete learning assessment and assess knowledge base    Interventions:  - Provide teaching at level of understanding  - Provide teaching via preferred learning methods  Outcome: Progressing

## 2021-03-18 NOTE — PROGRESS NOTES
Gynecology Progress note   Thierno Du 22 y o  female MRN: [id number]  Unit/Bed#: ICU 12 Encounter: [id number]        A/P: 22 y o  with a history of chronic HTN, PPD#7 from an , admitted with bradycardia with evidence of [place] type I block  Doing well today, Magnesium was started yday for concerns for superimposed pre-eclampsia with severe features, she had one headache overnight but this has completely resolved with tylenol  Her HR is in the 70s and her left breast mastitis is improving with ABx treatment  From an obstetric standpoint no concern for pre-eclampsia residual symptoms or blood pressures today  No need for Mag  Likely no need for tele (pending cards recommendation), possible discharge home, ultimate dispo per primary team    1  Chronic hypertension with superimposed preeclampsia with severe features  - D/c Mag, BP have been stable 120-130s/70-80s, no signs or Sx of pre-eclampsia    2  Bradycardia and chest pain  - Echocardiogram wnl, f/u with cardiology  - Continue workup per primary team    3  Mastitis  - patient reports she is much improved  - continue dicloxacillin and Sudafed to help decrease milk supply    4  Abdominal pain  - resolved, no suspicion for endometritis, patient remains afebrile    5  DVT ppx:   - Continue Lovenox 40 mg daily      Discussed with Dr Mercy Goldmann and Dr Faiza Mahoney:    Patient states that after receiving tylenol overnight her headache has completely resolved, she feels great and has no complaints  /74   Pulse 60   Temp 98 2 °F (36 8 °C) (Temporal)   Resp 20   Wt 98 1 kg (216 lb 4 3 oz)   LMP 2020   SpO2 97%   BMI 37 12 kg/m²     I/O last 3 completed shifts: In: 2303 [P O :840; I V :275; IV Piggyback:150]  Out: 1500 [Urine:1500]  No intake/output data recorded      Lab Results   Component Value Date    WBC 8 01 2021    HGB 12 4 2021    HCT 37 9 2021    MCV 96 2021     2021       Lab Results   Component Value Date    CALCIUM 8 0 (L) 03/18/2021    K 3 9 03/18/2021    CO2 24 03/18/2021     03/18/2021    BUN 9 03/18/2021    CREATININE 0 89 03/18/2021       Lab Results   Component Value Date/Time    POCGLU 85 03/16/2021 02:42 PM    POCGLU 93 03/11/2021 03:25 AM    POCGLU 75 03/11/2021 12:48 AM         Physical Exam  Constitutional:       General: She is not in acute distress  Appearance: She is not toxic-appearing or diaphoretic  HENT:      Head: Normocephalic and atraumatic  Pulmonary:      Effort: Pulmonary effort is normal  No respiratory distress  Abdominal:      General: There is no distension  Palpations: Abdomen is soft  Tenderness: There is no abdominal tenderness  There is no guarding or rebound  Musculoskeletal:         General: No swelling or tenderness  Right lower leg: No edema  Left lower leg: No edema  Skin:     General: Skin is warm and dry  Neurological:      General: No focal deficit present  Mental Status: She is alert and oriented to person, place, and time  Mental status is at baseline  Psychiatric:         Mood and Affect: Mood normal          Thought Content:  Thought content normal          Judgment: Judgment normal                  Stephany Navarrete DO  3/18/2021  7:41 AM

## 2021-03-18 NOTE — DISCHARGE INSTRUCTIONS
Preeclampsia During Pregnancy   WHAT YOU NEED TO KNOW:   What is preeclampsia? Preeclampsia is high blood pressure (BP) that usually develops after week 20 of pregnancy  It can also develop days or weeks after delivery  You may also have protein in your urine or damage to organs such as your kidneys or liver  Chronic hypertension with superimposed preeclampsia is preeclampsia in a woman with a history of hypertension before pregnancy  It can also be preeclampsia that develops before week 20 of pregnancy  What increases my risk for preeclampsia? · Preeclampsia in a past pregnancy    · Being pregnant with more than 1 baby, or your first pregnancy    · Kidney disease, an autoimmune disease, diabetes, or chronic hypertension    · Age 28 years or older    · Being overweight    · A family history of preeclampsia    What are the signs and symptoms of preeclampsia? · Swollen face and hands    · A sudden weight gain of 5 pounds or more    · Headache    · Spotted or blurred vision     · Pain in your upper abdomen    How is preeclampsia diagnosed? Preeclampsia can lead to life-threatening conditions such as a stroke, eclampsia (seizures), or HELLP syndrome (blood cell destruction)  It is important to get screened for high BP during pregnancy  High BP does not always cause symptoms  Symptoms that do develop may be general, such as headaches and swelling that you may think are not serious  Tell your healthcare provider if you had hypertension before you were pregnant  Also tell him or her about symptoms you are having, even if you think it is not serious  · BP readings  will be done regularly during your pregnancy  Preeclampsia means your BP is 140/90 or higher in 2 readings 4 hours apart  One or both numbers may be high  Your healthcare provider will also look at the results of your blood and urine tests  · Blood tests  are done to check your liver and kidney function   You may need blood tests every week while you are pregnant  · Urine tests  are used to check for protein  You may need to give healthcare providers a urine sample at each visit  You may also need to collect your urine every time you urinate for 24 hours  You may still have preeclampsia even if you do not have protein in your urine  How is preeclampsia treated? Treatment depends on how high your BP is and how many weeks you are into your pregnancy  Before 37 weeks, healthcare providers may want to monitor your condition if your BP is not severely high  An ultrasound may be done every 3 to 4 weeks to check your baby's growth  The amount of amniotic fluid may be measured every week  Your provider will tell you how often to come in for tests  You may also need any of the following:  · Medicines  may be given to lower your blood pressure, protect your organs, or prevent seizures  You may be given steroid medicine to help your baby's lungs develop  These may be given if you have to deliver before 37 weeks of pregnancy  Low doses of aspirin after 12 weeks of pregnancy may be recommended if you are at high risk for preeclampsia  Aspirin may help prevent preeclampsia or problems that can happen from preeclampsia  Do not take aspirin unless directed by your healthcare provider  · Delivery  may stop preeclampsia  Healthcare providers may deliver your baby right away if he or she is full-term (37 weeks or more)  He or she may need to be delivered early if you or the baby has life-threatening symptoms  What can I do to manage preeclampsia during pregnancy? Your BP will need to be checked by healthcare providers 1 to 2 times each week until your baby is born  The following are ways you can help manage high BP during pregnancy:  · Rest as directed  Your healthcare provider may tell you to rest more often if you have mild symptoms of preeclampsia  You may need to be in the hospital if your condition worsens  · Do not drink alcohol or smoke    Alcohol, nicotine, and other chemicals in cigarettes and cigars, can increase your BP  They can also harm your baby  Ask your healthcare provider for information if you currently drink alcohol or smoke and need help to quit  E-cigarettes or smokeless tobacco still contain nicotine  Talk to your healthcare provider before you use these products  · Do kick counts as directed  You may need to keep track of how often your baby moves or kicks over a certain amount of time  Ask your obstetrician how to do kick counts and how often to do them  · Check your weight each day  Weigh yourself every day before breakfast  Weight gain can be a sign of extra fluid in your body  Call your obstetrician if you have gained 2 or more pounds in a week  Call your local emergency number (911 in the [address]) if:   · You have a seizure  · You have chest pain  When should I seek immediate care? · You have severe abdominal pain with or without nausea and vomiting  · You develop a severe headache that does not go away with medicine  · You have blurred or spotted vision that does not go away  · You are bleeding from your vagina  When should I call my doctor or obstetrician? · You have new or increased swelling in your face or hands  · You are urinating little or not at all  · You do not feel your baby's movements as often as usual     · You have questions or concerns about your condition or care  CARE AGREEMENT:   You have the right to help plan your care  Learn about your health condition and how it may be treated  Discuss treatment options with your healthcare providers to decide what care you want to receive  You always have the right to refuse treatment  The above information is an  only  It is not intended as medical advice for individual conditions or treatments  Talk to your doctor, nurse or pharmacist before following any medical regimen to see if it is safe and effective for you    © Copyright [address] Information is for [employer] use only and may not be sold, redistributed or otherwise used for commercial purposes  All illustrations and images included in CareNotes® are the copyrighted property of [employer]  or [employer]      Mastitis   WHAT YOU NEED TO KNOW:   Mastitis is an infection of breast tissue that most often occurs in women who breastfeed  It can happen any time during breastfeeding, but usually occurs within the first 3 months after giving birth  Usually only one breast is affected  DISCHARGE INSTRUCTIONS:   Contact your healthcare provider if:   · Your symptoms do not get better within 2 days  · You have a painful lump in your breast      · You have swollen and tender lymph nodes in your armpit on the same side as the affected breast     · You have questions or concerns about your condition or care  Medicines: You may need any of the following:  · Antibiotics  help treat or prevent a bacterial infection  · Acetaminophen  decreases pain and fever  It is available without a doctor's order  Ask how much to take and how often to take it  Follow directions  Acetaminophen can cause liver damage if not taken correctly  · NSAIDs , such as ibuprofen, help decrease swelling, pain, and fever  This medicine is available with or without a doctor's order  NSAIDs can cause stomach bleeding or kidney problems in certain people  If you take blood thinner medicine, always ask your healthcare provider if NSAIDs are safe for you  Always read the medicine label and follow directions  · Take your medicine as directed  Contact your healthcare provider if you think your medicine is not helping or if you have side effects  Tell him or her if you are allergic to any medicine  Keep a list of the medicines, vitamins, and herbs you take  Include the amounts, and when and why you take them  Bring the list or the pill bottles to follow-up visits   Carry your medicine list with you in case of an emergency  Manage your symptoms:   · Continue to breastfeed from the affected breast   This will help to prevent an abscess from forming  Breastfeed your baby on the affected side first  Apply a warm, wet cloth on your breast or take a warm shower before you feed your baby  This can help increase your milk flow  If it is painful when you breastfeed from the affected breast, feed your baby from the other breast first  Pump the affected side to completely drain your breast after breastfeeding, if needed  You may save the pumped milk to feed your baby  · Use different positions to breastfeed  Change the position of your baby during feedings  This may help to relieve your discomfort  · Apply heat on your breast for 20 to 30 minutes every 2 hours for as many days as directed  Heat helps decrease pain  · Apply ice after feedings  Apply ice on your breast for 15 to 20 minutes every hour or as directed  Use an ice pack, or put crushed ice in a plastic bag  Cover it with a towel  Ice helps prevent tissue damage and decreases swelling and pain  · Massage your breast   Gently massage your breast before and during breastfeeding to help drain your milk  · Drink liquids as directed  Ask how much liquid to drink each day and which liquids are best for you  · Rest as needed  Do not sleep on your stomach until your infection is gone  Prevent mastitis:   · Breastfeed every 2 or 3 hours to prevent engorgement  Breast engorgement develops when too much milk builds up in your breast  Take your time when you breastfeed to allow your baby to empty your breast  Try not to switch breasts too early  Express or pump after you breastfeed if your baby is not emptying your breasts when he feeds  · Prevent sore and cracked nipples  A good latch prevents sore and cracked nipples  If you have sore nipples after breastfeeding, your baby may not be latched on properly   Gently break suction and reposition if your baby is only sucking on the nipple  Talk to a lactation consultant if you need help with your baby's latch  · Care for your breasts  Keep your nipples clean and dry between feedings  Check them for cracks, blisters, or other irritated areas  Ask a lactation specialist or your healthcare provider how to treat sore and cracked nipples  Wash your hands before and after you breastfeed your baby or pump your breasts  Wear a comfortable nursing bra that supports your breasts but is not too tight  Follow up with your healthcare provider as directed:  Write down your questions so you remember to ask them during your visits  © Copyright [address] Information is for End User's use only and may not be sold, redistributed or otherwise used for commercial purposes  All illustrations and images included in CareNotes® are the copyrighted property of [employer]  or [employer] Jeong   The above information is an  only  It is not intended as medical advice for individual conditions or treatments  Talk to your doctor, nurse or pharmacist before following any medical regimen to see if it is safe and effective for you  Heart Palpitations   AMBULATORY CARE:   Heart palpitations  are feelings that your heart races, jumps, throbs, or flutters  You may feel extra beats, no beats for a short time, or skipped beats  You may have these feelings in your chest, throat, or neck  They may happen when you are sitting, standing, or lying  Heart palpitations may be frightening, but are usually not caused by a serious problem  Call 911 or have someone else call for any of the following:   · You have any of the following signs of a heart attack:      ? Squeezing, pressure, or pain in your chest    ? You may  also have any of the following:     ? Discomfort or pain in your back, neck, jaw, stomach, or arm    ? Shortness of breath    ? Nausea or vomiting    ?  Lightheadedness or a sudden cold sweat    · You have any of the following signs of a stroke:      ? Numbness or drooping on one side of your face     ? Weakness in an arm or leg    ? Confusion or difficulty speaking    ? Dizziness, a severe headache, or vision loss    · You faint or lose consciousness  Seek care immediately if:   · Your palpitations happen more often or last longer than usual      · You have palpitations and shortness of breath, nausea, sweating, or dizziness  Contact your healthcare provider if:   · You have questions or concerns about your condition or care  Follow up with your healthcare provider as directed: You may need to follow up with a cardiologist  Ronen Hyman may need tests to check for heart problems that cause palpitations  Write down your questions so you remember to ask them during your visits  Treatment for heart palpitations  is usually not needed  Your healthcare provider may stop or change your medicines if they are causing your palpitations  Conditions that cause palpitations, such as an abnormal heartbeat, will be treated  Keep a record:  Write down when your palpitations start and stop, what you were doing when they started, and your symptoms  Keep track of what you ate or drank within a few hours of your palpitations  Include anything that seemed to help your symptoms, such as lying down or holding your breath  This record will help you and your healthcare provider learn what triggers your palpitations  Bring this record with you to your follow up visits  Help prevent heart palpitations:   · Manage stress and anxiety  Find ways to relax such as listening to music, meditating, or doing yoga  Exercise can also help decrease stress and anxiety  Talk to someone you trust about your stress or anxiety  You can also talk to a therapist      · Get plenty of sleep every night  Ask your healthcare provider how much sleep you need each night  · Do not drink caffeine or alcohol    Caffeine and alcohol can make your palpitations worse  Caffeine is found in soda, coffee, tea, chocolate, and drinks that increase your energy  · Do not smoke  Nicotine and other chemicals in cigarettes and cigars may damage your heart and blood vessels  Ask your healthcare provider for information if you currently smoke and need help to quit  E-cigarettes or smokeless tobacco still contain nicotine  Talk to your healthcare provider before you use these products  · Do not use illegal drugs  Talk to your healthcare provider if you use illegal drugs and want help to quit  © Copyright [address] Information is for End User's use only and may not be sold, redistributed or otherwise used for commercial purposes  All illustrations and images included in CareNotes® are the copyrighted property of [employer]  or [facility] Fay Jeong   The above information is an  only  It is not intended as medical advice for individual conditions or treatments  Talk to your doctor, nurse or pharmacist before following any medical regimen to see if it is safe and effective for you

## 2021-03-18 NOTE — NURSING NOTE
AVS reviewed with patient  Ensured patient understood follow-up appointments as well as medications  Patient expressed understanding  Reviewed s/s of pre eclampsia and who to call and when to return to the ER  Patient left ICU in baseline condition with PIV's removed

## 2021-03-18 NOTE — PROGRESS NOTES
Late entry note due to patient care/acuity on the floor  Evaluated patient around 9 pm with Dr Traci Morillo  Patient started with a headache a short while ago  Denies chest pain, SOB, palpitations, dizziness  Patient was started on magnesium infusion for suspected preeclampsia with severe features given elevated blood pressures earlier today  Bedside exam was performed  Reflexes were WNL  Heart/lung exam WNL  Pt is calm, sitting up in bed, conversing with us without difficulty  Biggest complaint at this time is her headache  Patient's mastitis is significantly improved  Will given Tylenol for headache at this time to see if her symptoms resolve  BP's at this time in the 130-140s/70s  Other vitals stable

## 2021-03-18 NOTE — ASSESSMENT & PLAN NOTE
Lab Results   Component Value Date    HGBA1C 5 7 08/28/2017       Recent Labs     03/16/21  1442   POCGLU 85       Blood Sugar Average: Last 72 hrs:  (P) 85

## 2021-03-19 ENCOUNTER — POSTPARTUM VISIT (OUTPATIENT)
Dept: OBGYN CLINIC | Facility: MEDICAL CENTER | Age: 26
End: 2021-03-19
Payer: COMMERCIAL

## 2021-03-19 VITALS
BODY MASS INDEX: 37.05 KG/M2 | SYSTOLIC BLOOD PRESSURE: 144 MMHG | WEIGHT: 217 LBS | HEIGHT: 64 IN | DIASTOLIC BLOOD PRESSURE: 96 MMHG

## 2021-03-19 DIAGNOSIS — R00.1 BRADYCARDIA: ICD-10-CM

## 2021-03-19 DIAGNOSIS — O11.9 CHRONIC HYPERTENSION WITH SUPERIMPOSED PRE-ECLAMPSIA: ICD-10-CM

## 2021-03-19 LAB — PLACENTA IN STORAGE: NORMAL

## 2021-03-19 PROCEDURE — 99213 OFFICE O/P EST LOW 20 MIN: CPT | Performed by: OBSTETRICS & GYNECOLOGY

## 2021-03-19 NOTE — PROGRESS NOTES
Problem List Items Addressed This Visit        Cardiovascular and Mediastinum    Chronic hypertension with superimposed pre-eclampsia    Preeclampsia in postpartum period - Primary     Admitted to the ICU with bradycardia - heart block normal EF  Chronic hypertension with PEC sever features  24 hours of magnesium             Other    Bradycardia     recommend to follow up with PCP             Today the BP is 144/92  Went over to try to find a way to relax  She should not take BP more then once a day     She is following up with PCP on Monday     Will follow up in 1 week

## 2021-03-21 LAB
BACTERIA BLD CULT: NORMAL
BACTERIA BLD CULT: NORMAL

## 2021-06-30 ENCOUNTER — OFFICE VISIT (OUTPATIENT)
Dept: OBGYN CLINIC | Facility: MEDICAL CENTER | Age: 26
End: 2021-06-30
Payer: COMMERCIAL

## 2021-06-30 VITALS
BODY MASS INDEX: 35.51 KG/M2 | SYSTOLIC BLOOD PRESSURE: 130 MMHG | DIASTOLIC BLOOD PRESSURE: 94 MMHG | WEIGHT: 208 LBS | HEIGHT: 64 IN

## 2021-06-30 DIAGNOSIS — N89.8 VAGINAL DISCHARGE: Primary | ICD-10-CM

## 2021-06-30 DIAGNOSIS — N76.0 BACTERIAL VAGINOSIS: ICD-10-CM

## 2021-06-30 DIAGNOSIS — B96.89 BACTERIAL VAGINOSIS: ICD-10-CM

## 2021-06-30 DIAGNOSIS — N39.0 RECURRENT UTI: ICD-10-CM

## 2021-06-30 DIAGNOSIS — R35.0 URINARY FREQUENCY: ICD-10-CM

## 2021-06-30 DIAGNOSIS — N39.3 STRESS INCONTINENCE OF URINE: ICD-10-CM

## 2021-06-30 DIAGNOSIS — Z86.32 HISTORY OF GESTATIONAL DIABETES: ICD-10-CM

## 2021-06-30 LAB
BV WHIFF TEST VAG QL: NEGATIVE
CLUE CELLS SPEC QL WET PREP: POSITIVE
SL AMB  POCT GLUCOSE, UA: NORMAL
SL AMB LEUKOCYTE ESTERASE,UA: NORMAL
SL AMB POCT BILIRUBIN,UA: NORMAL
SL AMB POCT BLOOD,UA: NORMAL
SL AMB POCT CLARITY,UA: CLEAR
SL AMB POCT COLOR,UA: YELLOW
SL AMB POCT KETONES,UA: NORMAL
SL AMB POCT NITRITE,UA: NORMAL
SL AMB POCT PH,UA: 7
SL AMB POCT SPECIFIC GRAVITY,UA: 1.02
SL AMB POCT URINE PROTEIN: NORMAL
T VAGINALIS VAG QL WET PREP: NEGATIVE
YEAST VAG QL WET PREP: NEGATIVE

## 2021-06-30 PROCEDURE — 87210 SMEAR WET MOUNT SALINE/INK: CPT | Performed by: OBSTETRICS & GYNECOLOGY

## 2021-06-30 PROCEDURE — 99214 OFFICE O/P EST MOD 30 MIN: CPT | Performed by: OBSTETRICS & GYNECOLOGY

## 2021-06-30 PROCEDURE — 81002 URINALYSIS NONAUTO W/O SCOPE: CPT | Performed by: OBSTETRICS & GYNECOLOGY

## 2021-06-30 RX ORDER — BUSPIRONE HYDROCHLORIDE 10 MG/1
10 TABLET ORAL 2 TIMES DAILY
COMMUNITY
Start: 2021-04-05 | End: 2022-04-05

## 2021-06-30 RX ORDER — METRONIDAZOLE 500 MG/1
500 TABLET ORAL EVERY 12 HOURS SCHEDULED
Qty: 14 TABLET | Refills: 0 | Status: SHIPPED | OUTPATIENT
Start: 2021-06-30 | End: 2021-07-07

## 2021-06-30 RX ORDER — QUINAPRIL 10 MG/1
10 TABLET ORAL EVERY EVENING
COMMUNITY
Start: 2021-04-25

## 2021-06-30 RX ORDER — LABETALOL 200 MG/1
200 TABLET, FILM COATED ORAL 2 TIMES DAILY
COMMUNITY
Start: 2021-03-22 | End: 2022-03-22

## 2021-06-30 NOTE — PROGRESS NOTES
Assessment:  22 y o  [id number] who presents with bacterial vaginosis  Plan:  Diagnoses and all orders for this visit:    Vaginal discharge  Bacterial vaginosis  -     Chlamydia/GC amplified DNA by PCR  -     POCT wet mount  -     EQ Probiotic-Lactobacillus CAPS; Take 1 capsule by mouth daily  -     metroNIDAZOLE (FLAGYL) 500 mg tablet; Take 1 tablet (500 mg total) by mouth every 12 (twelve) hours for 7 days    Stress incontinence of urine  Recurrent UTI  Urinary frequency  -     Urine culture  -     EQ Probiotic-Lactobacillus CAPS; Take 1 capsule by mouth daily  -     POCT urine dip  - Interested in Pelvic PT  Pamphlet provided    History of gestational diabetes  -     Glucose RENEE 2HR 75GM Nonpreg; Future        __________________________________________________________________    Subjective   Maritza Prajapati is a 22 y o  [id number] who presents with concern for vaginal vs urinary infection  Partner s/p vasectomy with neg SA  Patient reports vaginal odor x1wk  Foul in nature, fishy  Discharge heavy, no color or consistency change  No rashes/lesions  Worried about UTI  No dysuria, but +frequency  Feels she gets them frequently and this is how they typically present  Also has had some lower back pain  Reports urinary leakage  Sometimes unprovoked (zac lately), but most often with sneeze/cough  Discussed PT prior and wishes to proceed with this now  The following portions of the patient's history were reviewed and updated as appropriate: allergies, current medications, past medical history, past social history and problem list     Review of Systems  Review of Systems   Constitutional: Negative for chills, fatigue and fever  Respiratory: Negative for cough and shortness of breath  Gastrointestinal: Negative for abdominal distention and abdominal pain  Genitourinary: Positive for frequency and vaginal discharge   Negative for difficulty urinating, dysuria, genital sores, menstrual problem, pelvic pain, urgency, vaginal bleeding and vaginal pain  Musculoskeletal: Positive for back pain  Skin: Negative for rash and wound  Objective  /94   Ht 5' 4" (1 626 m)   Wt 94 3 kg (208 lb)   LMP 06/04/2021 (Exact Date)   Breastfeeding No   BMI 35 70 kg/m²      Physical Exam:  Physical Exam  Constitutional:       General: She is not in acute distress  Appearance: Normal appearance  She is not ill-appearing, toxic-appearing or diaphoretic  Eyes:      General: No scleral icterus  Right eye: No discharge  Left eye: No discharge  Conjunctiva/sclera: Conjunctivae normal    Cardiovascular:      Rate and Rhythm: Normal rate  Pulmonary:      Effort: Pulmonary effort is normal  No respiratory distress  Abdominal:      General: There is no distension  Palpations: There is no mass  Tenderness: There is no abdominal tenderness  There is no guarding or rebound  Hernia: No hernia is present  Musculoskeletal:         General: No swelling  Skin:     General: Skin is warm and dry  Coloration: Skin is not jaundiced or pale  Findings: No bruising or erythema  Neurological:      Mental Status: She is alert  Psychiatric:         Mood and Affect: Mood normal          Behavior: Behavior normal          Thought Content:  Thought content normal          Judgment: Judgment normal          Microscopy consistent with BV  UA normal

## 2021-07-02 LAB
C TRACH RRNA SPEC QL NAA+PROBE: NOT DETECTED
N GONORRHOEA RRNA SPEC QL NAA+PROBE: NOT DETECTED

## 2021-09-20 ENCOUNTER — OFFICE VISIT (OUTPATIENT)
Dept: OBGYN CLINIC | Facility: CLINIC | Age: 26
End: 2021-09-20
Payer: COMMERCIAL

## 2021-09-20 VITALS
HEIGHT: 64 IN | BODY MASS INDEX: 32.47 KG/M2 | DIASTOLIC BLOOD PRESSURE: 78 MMHG | WEIGHT: 190.2 LBS | SYSTOLIC BLOOD PRESSURE: 120 MMHG

## 2021-09-20 DIAGNOSIS — B96.89 BV (BACTERIAL VAGINOSIS): Primary | ICD-10-CM

## 2021-09-20 DIAGNOSIS — B37.9 CANDIDA INFECTION: ICD-10-CM

## 2021-09-20 DIAGNOSIS — Z11.3 SCREENING EXAMINATION FOR STD (SEXUALLY TRANSMITTED DISEASE): ICD-10-CM

## 2021-09-20 DIAGNOSIS — N76.0 BV (BACTERIAL VAGINOSIS): Primary | ICD-10-CM

## 2021-09-20 DIAGNOSIS — N91.2 AMENORRHEA: ICD-10-CM

## 2021-09-20 LAB
BV WHIFF TEST VAG QL: NEGATIVE
CLUE CELLS SPEC QL WET PREP: POSITIVE
SL AMB POCT URINE HCG: NEGATIVE
SL AMB POCT WET MOUNT: POSITIVE
T VAGINALIS VAG QL WET PREP: NEGATIVE
YEAST VAG QL WET PREP: POSITIVE

## 2021-09-20 PROCEDURE — 99214 OFFICE O/P EST MOD 30 MIN: CPT | Performed by: OBSTETRICS & GYNECOLOGY

## 2021-09-20 PROCEDURE — 81025 URINE PREGNANCY TEST: CPT | Performed by: OBSTETRICS & GYNECOLOGY

## 2021-09-20 PROCEDURE — 87210 SMEAR WET MOUNT SALINE/INK: CPT | Performed by: OBSTETRICS & GYNECOLOGY

## 2021-09-20 RX ORDER — NYSTATIN AND TRIAMCINOLONE ACETONIDE 100000; 1 [USP'U]/G; MG/G
OINTMENT TOPICAL 2 TIMES DAILY
Qty: 60 G | Refills: 1 | Status: SHIPPED | OUTPATIENT
Start: 2021-09-20

## 2021-09-20 RX ORDER — LEVOTHYROXINE SODIUM 88 UG/1
TABLET ORAL
COMMUNITY
Start: 2021-07-12

## 2021-09-20 RX ORDER — METRONIDAZOLE 500 MG/1
500 TABLET ORAL EVERY 12 HOURS SCHEDULED
Qty: 14 TABLET | Refills: 0 | Status: SHIPPED | OUTPATIENT
Start: 2021-09-20 | End: 2021-09-27

## 2021-09-20 RX ORDER — ALBUTEROL SULFATE 90 UG/1
AEROSOL, METERED RESPIRATORY (INHALATION)
COMMUNITY
Start: 2021-09-09

## 2021-09-20 RX ORDER — AMOXICILLIN AND CLAVULANATE POTASSIUM 875; 125 MG/1; MG/1
TABLET, FILM COATED ORAL
COMMUNITY
Start: 2021-09-09

## 2021-09-20 RX ORDER — FLUCONAZOLE 150 MG/1
150 TABLET ORAL ONCE
Qty: 2 TABLET | Refills: 0 | Status: SHIPPED | OUTPATIENT
Start: 2021-09-20 | End: 2021-09-20

## 2021-09-20 NOTE — PROGRESS NOTES
[facility] [address], [address], [place], [place]    Assessment/Plan:  No problem-specific Assessment & Plan notes found for this encounter  Diagnoses and all orders for this visit:    BV (bacterial vaginosis)  -     metroNIDAZOLE (FLAGYL) 500 mg tablet; Take 1 tablet (500 mg total) by mouth every 12 (twelve) hours for 7 days  -     POCT wet mount    Screening examination for STD (sexually transmitted disease)  -     RPR; Future  -     Human Immunodeficiency Virus 1/2 Antigen / Antibody ( Fourth Generation) with Reflex Testing; Future  -     Hepatitis B surface antigen; Future  -     RPR  -     Human Immunodeficiency Virus 1/2 Antigen / Antibody ( Fourth Generation) with Reflex Testing  -     Hepatitis B surface antigen  -     Chlamydia/GC amplified DNA by PCR    Amenorrhea  -     POCT urine HCG  -     Chlamydia/GC amplified DNA by PCR    Candida infection  -     fluconazole (DIFLUCAN) 150 mg tablet; Take 1 tablet (150 mg total) by mouth once for 1 dose Repeat in 1 week  -     nystatin-triamcinolone (MYCOLOG-II) ointment; Apply topically 2 (two) times a day    Other orders  -     albuterol (PROVENTIL HFA,VENTOLIN HFA) 90 mcg/act inhaler; 2 puffs INH q4-6 hours PRN wheezing, shortness of breath  -     amoxicillin-clavulanate (AUGMENTIN) 875-125 mg per tablet; take 1 tablet by mouth every 12 hours for 10 days  -     levothyroxine (Euthyrox) 88 mcg tablet; Take 1 tablet by mouth once daily      Subjective:   Kym Howard is a 22 y o  [id number] female  CC: vaginal discharge/itching    HPI: HPI  Patient presents with complaints of vaginal discharge, itching, burning, and odor  She states the vulva area is really irritated causing a breakdown of the skin in the folds  ROS: Review of Systems   Constitutional: Negative  HENT: Negative  Eyes: Negative  Respiratory: Negative  Cardiovascular: Negative  Gastrointestinal: Negative      Genitourinary: Positive for vaginal discharge and vaginal pain  Musculoskeletal: Negative  All other systems reviewed and are negative  PFSH: The following portions of the patient's history were reviewed and updated as appropriate: allergies, current medications, past family history, past medical history, obstetric history, gynecologic history, past social history, past surgical history and problem list        Objective:  /78   Ht 5' 4" (1 626 m)   Wt 86 3 kg (190 lb 3 2 oz)   LMP  (Within Months) Comment: july  Breastfeeding No   BMI 32 65 kg/m²    Physical Exam  Vitals reviewed  Constitutional:       Appearance: Normal appearance  Cardiovascular:      Rate and Rhythm: Normal rate  Pulmonary:      Effort: Pulmonary effort is normal  No respiratory distress  Genitourinary:     Exam position: Lithotomy position  Labia:         Right: Rash present  Left: Rash and lesion present  Vagina: Vaginal discharge present  Cervix: Normal       Uterus: Normal        Adnexa: Right adnexa normal and left adnexa normal    Neurological:      Mental Status: She is alert     Psychiatric:         Mood and Affect: Mood normal          Behavior: Behavior normal

## 2021-09-21 LAB
C TRACH RRNA SPEC QL NAA+PROBE: NOT DETECTED
N GONORRHOEA RRNA SPEC QL NAA+PROBE: NOT DETECTED

## 2022-10-12 PROBLEM — N39.0 UTI (URINARY TRACT INFECTION): Status: RESOLVED | Noted: 2021-03-16 | Resolved: 2022-10-12

## 2023-03-21 ENCOUNTER — ANNUAL EXAM (OUTPATIENT)
Dept: OBGYN CLINIC | Facility: MEDICAL CENTER | Age: 28
End: 2023-03-21

## 2023-03-21 VITALS
DIASTOLIC BLOOD PRESSURE: 70 MMHG | SYSTOLIC BLOOD PRESSURE: 124 MMHG | BODY MASS INDEX: 25.95 KG/M2 | WEIGHT: 152 LBS | HEIGHT: 64 IN

## 2023-03-21 DIAGNOSIS — Z30.41 ENCOUNTER FOR SURVEILLANCE OF CONTRACEPTIVE PILLS: ICD-10-CM

## 2023-03-21 DIAGNOSIS — Z01.419 ENCOUNTER FOR WELL WOMAN EXAM WITH ROUTINE GYNECOLOGICAL EXAM: Primary | ICD-10-CM

## 2023-03-21 DIAGNOSIS — N89.8 VAGINAL DISCHARGE: ICD-10-CM

## 2023-03-21 PROBLEM — O11.9 CHRONIC HYPERTENSION WITH SUPERIMPOSED PRE-ECLAMPSIA: Status: RESOLVED | Noted: 2021-03-17 | Resolved: 2023-03-21

## 2023-03-21 PROBLEM — O99.213 OBESITY AFFECTING PREGNANCY IN THIRD TRIMESTER: Chronic | Status: RESOLVED | Noted: 2020-11-09 | Resolved: 2023-03-21

## 2023-03-21 RX ORDER — ALPRAZOLAM 0.5 MG/1
0.5 TABLET ORAL DAILY PRN
COMMUNITY
Start: 2023-03-03

## 2023-03-21 RX ORDER — ESCITALOPRAM OXALATE 10 MG/1
10 TABLET ORAL DAILY
COMMUNITY
Start: 2023-02-17

## 2023-03-21 RX ORDER — NORETHINDRONE ACETATE AND ETHINYL ESTRADIOL 1; .02 MG/1; MG/1
1 TABLET ORAL DAILY
Qty: 90 TABLET | Refills: 3 | Status: SHIPPED | OUTPATIENT
Start: 2023-03-21

## 2023-03-21 NOTE — PROGRESS NOTES
[facility] [address] [place] [address]    ASSESSMENT/PLAN: Génesis Ellis is a 32 y o  [id number] who presents for annual gynecologic exam     Encounter for routine gynecologic examination  - Routine well woman exam completed today  - Cervical Cancer Screening: Current ASCCP Guidelines reviewed  Last Pap: 2018  Next Pap Due: today  - HPV Vaccination status: Immunization series complete  - STI screening offered including HIV testing: not done  - Contraceptive counseling discussed  Current contraception: combination OCPs script sent      Additional problems addressed during this visit:  1  Encounter for well woman exam with routine gynecological exam  -     Liquid-based pap, screening    2  Vaginal discharge  -     VAGINOSIS DNA PROBE (AFFIRM)        CC: Annual Gynecologic Examination    HPI: Génesis Ellis is a 32 y o  [id number] who presents for annual gynecologic examination  HPI  For routine annual exam, doing well; reports vaginal itching for the past 2 days  The following portions of the patient's history were reviewed and updated as appropriate: allergies, current medications, past family history, past medical history, obstetric history, gynecologic history, past social history, past surgical history and problem list     Review of Systems   Constitutional: Negative  HENT: Negative  Eyes: Negative  Respiratory: Negative  Cardiovascular: Negative  Gastrointestinal: Negative  Genitourinary: Positive for vaginal discharge  Musculoskeletal: Negative  All other systems reviewed and are negative  Objective:  /70 (BP Location: Right arm)   Ht 5' 4" (1 626 m)   Wt 68 9 kg (152 lb)   LMP 02/28/2023   BMI 26 09 kg/m²    Physical Exam  Vitals reviewed  Constitutional:       General: She is not in acute distress  Appearance: She is well-developed  HENT:      Head: Normocephalic and atraumatic        Nose: Nose normal  Cardiovascular:      Rate and Rhythm: Normal rate  Pulmonary:      Effort: Pulmonary effort is normal  No respiratory distress  Chest:   Breasts:     Breasts are symmetrical       Right: Normal  No mass, nipple discharge, skin change or tenderness  Left: Normal  No mass, nipple discharge, skin change or tenderness  Abdominal:      General: There is no distension  Palpations: Abdomen is soft  There is no mass  Tenderness: There is no abdominal tenderness  There is no guarding or rebound  Genitourinary:     General: Normal vulva  Exam position: Lithotomy position  Labia:         Right: No lesion  Left: No lesion  Urethra: No prolapse (urethral meatus normal)  Vagina: Normal  No vaginal discharge, erythema or bleeding  Cervix: Normal       Uterus: Normal        Adnexa: Right adnexa normal and left adnexa normal    Musculoskeletal:         General: Normal range of motion  Cervical back: Normal range of motion  Lymphadenopathy:      Upper Body:      Right upper body: No supraclavicular, axillary or pectoral adenopathy  Left upper body: No supraclavicular, axillary or pectoral adenopathy  Lower Body: No right inguinal adenopathy  No left inguinal adenopathy  Skin:     General: Skin is warm and dry  Neurological:      Mental Status: She is alert and oriented to person, place, and time  Psychiatric:         Behavior: Behavior normal          Thought Content:  Thought content normal          Judgment: Judgment normal

## 2023-03-22 LAB
CANDIDA RRNA VAG QL PROBE: NEGATIVE
G VAGINALIS RRNA GENITAL QL PROBE: NEGATIVE
T VAGINALIS RRNA GENITAL QL PROBE: NEGATIVE

## 2023-03-28 LAB
LAB AP GYN PRIMARY INTERPRETATION: NORMAL
Lab: NORMAL

## 2023-07-14 NOTE — PLAN OF CARE
"7-14-23  Mom called wanted to know status on meds, I relayed:  \" prescriptions already sent for July and August. It looks like it was clarified that generic was ok\"    Mom will call [facility] brenden andino   " Problem: Knowledge Deficit  Goal: Patient/family/caregiver demonstrates understanding of disease process, treatment plan, medications, and discharge instructions  Description: Complete learning assessment and assess knowledge base  Interventions:  - Provide teaching at level of understanding  - Provide teaching via preferred learning methods  Outcome: Progressing     Problem: Labor & Delivery  Goal: Manages discomfort  Description: Assess and monitor for signs and symptoms of discomfort  Assess patient's pain level regularly and per hospital policy  Administer medications as ordered  Support use of nonpharmacological methods to help control pain such as distraction, imagery, relaxation, and application of heat and cold  Collaborate with interdisciplinary team and patient to determine appropriate pain management plan  1  Include patient in decisions related to comfort  2  Offer non-pharmacological pain management interventions  3  Report ineffective pain management to physician  Outcome: Progressing  Goal: Patient vital signs are stable  Description: 1  Assess vital signs - vaginal delivery    Outcome: Progressing     Problem: POSTPARTUM  Goal: Experiences normal postpartum course  Description: INTERVENTIONS:  - Monitor maternal vital signs  - Assess uterine involution and lochia  Outcome: Progressing  Goal: Appropriate maternal -  bonding  Description: INTERVENTIONS:  - Identify family support  - Assess for appropriate maternal/infant bonding   -Encourage maternal/infant bonding opportunities  - Referral to  or  as needed  Outcome: Progressing  Goal: Establishment of infant feeding pattern  Description: INTERVENTIONS:  - Assess breast/bottle feeding  - Refer to lactation as needed  Outcome: Progressing  Goal: Incision(s), wounds(s) or drain site(s) healing without S/S of infection  Description: INTERVENTIONS  - Assess and document risk factors for skin impairment   - Assess and document dressing, incision, wound bed, drain sites and surrounding tissue  - Consider nutrition services referral as needed  - Oral mucous membranes remain intact  - Provide patient/ family education  Outcome: Progressing     Problem: PAIN - ADULT  Goal: Verbalizes/displays adequate comfort level or baseline comfort level  Description: Interventions:  - Encourage patient to monitor pain and request assistance  - Assess pain using appropriate pain scale  - Administer analgesics based on type and severity of pain and evaluate response  - Implement non-pharmacological measures as appropriate and evaluate response  - Consider cultural and social influences on pain and pain management  - Notify physician/advanced practitioner if interventions unsuccessful or patient reports new pain  Outcome: Progressing     Problem: DISCHARGE PLANNING  Goal: Discharge to home or other facility with appropriate resources  Description: INTERVENTIONS:  - Identify barriers to discharge w/patient and caregiver  - Arrange for needed discharge resources and transportation as appropriate  - Identify discharge learning needs (meds, wound care, etc )  - Arrange for interpretive services to assist at discharge as needed  - Refer to Case Management Department for coordinating discharge planning if the patient needs post-hospital services based on physician/advanced practitioner order or complex needs related to functional status, cognitive ability, or social support system  Outcome: Progressing

## 2024-02-14 ENCOUNTER — CONSULT (OUTPATIENT)
Dept: OBGYN CLINIC | Facility: CLINIC | Age: 29
End: 2024-02-14
Payer: COMMERCIAL

## 2024-02-14 VITALS
DIASTOLIC BLOOD PRESSURE: 82 MMHG | SYSTOLIC BLOOD PRESSURE: 140 MMHG | BODY MASS INDEX: 38.24 KG/M2 | WEIGHT: 224 LBS | HEIGHT: 64 IN

## 2024-02-14 DIAGNOSIS — Z30.09 BIRTH CONTROL COUNSELING: Primary | ICD-10-CM

## 2024-02-14 PROBLEM — O24.419 GESTATIONAL DIABETES: Status: RESOLVED | Noted: 2021-03-16 | Resolved: 2024-02-14

## 2024-02-14 PROCEDURE — 99213 OFFICE O/P EST LOW 20 MIN: CPT | Performed by: OBSTETRICS & GYNECOLOGY

## 2024-02-14 NOTE — PROGRESS NOTES
"[facility]  [address], [address], [place]    Assessment/Plan:  No problem-specific Assessment & Plan notes found for this encounter.    There are no diagnoses linked to this encounter.      Subjective:   Anila Cheng is a 28 y.o.  female.  CC: discuss birth control     HPI: HPI  Patient presents for discussion of birth control. States her  was in the , so she was not taking the pill consistently. He returned home last April, therefore has been taking more consistently.  She states she has gained weight. She also is interested in getting pregnant again. They are planning for a pregnancy this year. We discussed stopping the pill now and starting a prenatal vitamin. Discussed eating and exercise.     ROS: Review of Systems   Constitutional:  Positive for unexpected weight change.   Respiratory: Negative.     Cardiovascular: Negative.    Gastrointestinal: Negative.    Genitourinary: Negative.    Musculoskeletal: Negative.    All other systems reviewed and are negative.      PFSH: The following portions of the patient's history were reviewed and updated as appropriate: allergies, current medications, past family history, past medical history, obstetric history, gynecologic history, past social history, past surgical history and problem list.       Objective:  /82   Ht 5' 4\" (1.626 m)   Wt 102 kg (224 lb)   LMP 2024 (Approximate)   BMI 38.45 kg/m²    Physical Exam  Vitals reviewed.   Constitutional:       Appearance: Normal appearance.   Cardiovascular:      Rate and Rhythm: Normal rate.   Pulmonary:      Effort: Pulmonary effort is normal. No respiratory distress.   Neurological:      Mental Status: She is alert.   Psychiatric:         Mood and Affect: Mood normal.         Behavior: Behavior normal.         "

## 2024-05-08 ENCOUNTER — TELEPHONE (OUTPATIENT)
Dept: OBGYN CLINIC | Facility: MEDICAL CENTER | Age: 29
End: 2024-05-08

## 2024-05-08 ENCOUNTER — ULTRASOUND (OUTPATIENT)
Dept: OBGYN CLINIC | Facility: MEDICAL CENTER | Age: 29
End: 2024-05-08
Payer: COMMERCIAL

## 2024-05-08 VITALS
DIASTOLIC BLOOD PRESSURE: 80 MMHG | WEIGHT: 239.4 LBS | BODY MASS INDEX: 40.87 KG/M2 | HEIGHT: 64 IN | SYSTOLIC BLOOD PRESSURE: 130 MMHG

## 2024-05-08 DIAGNOSIS — N91.2 AMENORRHEA: Primary | ICD-10-CM

## 2024-05-08 DIAGNOSIS — Z36.87 UNSURE OF LMP (LAST MENSTRUAL PERIOD) AS REASON FOR ULTRASOUND SCAN: ICD-10-CM

## 2024-05-08 PROCEDURE — 76817 TRANSVAGINAL US OBSTETRIC: CPT | Performed by: OBSTETRICS & GYNECOLOGY

## 2024-05-08 PROCEDURE — 99214 OFFICE O/P EST MOD 30 MIN: CPT | Performed by: OBSTETRICS & GYNECOLOGY

## 2024-05-08 RX ORDER — PNV 119/IRON FUM/FOLIC ACID 29 MG-1 MG
1 TABLET ORAL DAILY
COMMUNITY

## 2024-05-08 NOTE — PROGRESS NOTES
Assessment Anila was seen today for pregnancy ultrasound.    Diagnoses and all orders for this visit:    Amenorrhea  -     Ambulatory Referral to Maternal Fetal Medicine; Future  -     AMB US OB < 14 weeks single or first gestation level 1    Unsure of LMP (last menstrual period) as reason for ultrasound scan  -     AMB US OB < 14 weeks single or first gestation level 1    EDC from US today  2024   Unknown LMP dayna  on February  Did state had an unofficial US at [facility] location to make sure like IUP and was told there she was  about  8 weeks   With en estimated  die date of      Subjective   Anila Cheng is a 28 y.o. female here for a problem visit.  Patient is complaining of unsure of LMP and need for dating US  . Feeling well . A little worried as partner diagnosed with PE last night and currently  admitted  .     Patient Active Problem List   Diagnosis    Hypothyroid     (spontaneous vaginal delivery)    Bradycardia    Mastitis    Elevated brain natriuretic peptide (BNP) level       Gynecologic History  No LMP recorded.  The current method of family planning is none.    Past Medical History:   Diagnosis Date    Chronic hypertension affecting pregnancy 2020    Disease of thyroid gland     Gestational diabetes mellitus (GDM) in third trimester 2021    Hypothyroid     Seasonal allergies     Urinary tract infection     Varicella      Past Surgical History:   Procedure Laterality Date    KNEE CARTILAGE SURGERY Right     WISDOM TOOTH EXTRACTION      WRIST SURGERY Left      Family History   Problem Relation Age of Onset    No Known Problems Mother     Hyperthyroidism Father     Pancreatic cancer Maternal Grandmother     Heart attack Maternal Grandfather     No Known Problems Paternal Grandmother     Kidney failure Paternal Grandfather     Breast cancer Neg Hx     Colon cancer Neg Hx     Ovarian cancer Neg Hx      Social History     Socioeconomic History    Marital  status: Unknown     Spouse name: Not on file    Number of children: Not on file    Years of education: Not on file    Highest education level: Not on file   Occupational History    Not on file   Tobacco Use    Smoking status: Former     Current packs/day: 0.00     Average packs/day: 1 pack/day for 4.0 years (4.0 ttl pk-yrs)     Types: Cigarettes     Start date: 2012     Quit date: 2016     Years since quittin.3    Smokeless tobacco: Never   Vaping Use    Vaping status: Former    Quit date: 2016   Substance and Sexual Activity    Alcohol use: Not Currently     Comment: rarely prior to pregnancy    Drug use: Never    Sexual activity: Yes     Partners: Male   Other Topics Concern    Not on file   Social History Narrative    Not on file     Social Determinants of Health     Financial Resource Strain: Not on file   Food Insecurity: Not on file   Transportation Needs: Not on file   Physical Activity: Not on file   Stress: Not on file   Social Connections: Not on file   Intimate Partner Violence: Not on file   Housing Stability: Not on file     Allergies   Allergen Reactions    Pollen Extract        Current Outpatient Medications:     escitalopram (LEXAPRO) 10 mg tablet, Take 10 mg by mouth daily, Disp: , Rfl:     levothyroxine 88 mcg tablet, Take 1 tablet by mouth once daily, Disp: , Rfl:     Prenatal Vit-DSS-Fe Fum-FA (Prenatal 19) tablet, Take 1 tablet by mouth daily, Disp: , Rfl:     ALPRAZolam (XANAX) 0.5 mg tablet, Take 0.5 mg by mouth daily as needed (Patient not taking: Reported on 2024), Disp: , Rfl:     Review of Systems  Constitutional :no fever, feels well, no tiredness, no recent weight gain or loss  ENT: no ear ache, no loss of hearing, no nosebleeds or nasal discharge, no sore throat or hoarseness.  Cardiovascular: no complaints of slow or fast heart beat, no chest pain, no palpitations, no leg claudication or lower extremity edema.  Respiratory: no complaints of shortness of shortness of  "breath, no ABDI  Breasts:no complaints of breast pain, breast lump, or nipple discharge  Gastrointestinal: no complaints of abdominal pain, constipation, nausea, vomiting, or diarrhea or bloody stools  Genitourinary : no complaints of dysuria, incontinence, pelvic pain, no dysmenorrhea, vaginal discharge or abnormal vaginal bleeding and as noted in HPI.  Musculoskeletal: no complaints of arthralgia, no myalgia, no joint swelling or stiffness, no limb pain or swelling.  Integumentary: no complaints of skin rash or lesion, itching or dry skin  Neurological: no complaints of headache, no confusion, no numbness or tingling, no dizziness or fainting     Objective     /80   Ht 5' 4\" (1.626 m)   Wt 109 kg (239 lb 6.4 oz)   BMI 41.09 kg/m²     General:   appears stated age, cooperative, alert normal mood and affect   Lungs: Unlabored breathing     Abdomen: soft, non-tender, without masses or organomegaly   Vulva: normal, normal female genitalia, Bartholin's, Urethra, [place] normal, no lesions, normal female hair distribution, no clitoral enlargement   Skin normal skin turgor and no rashes.   Psychiatric orientation to person, place, and time: normal. mood and affect: normal      "

## 2024-05-13 ENCOUNTER — INITIAL PRENATAL (OUTPATIENT)
Dept: OBGYN CLINIC | Facility: MEDICAL CENTER | Age: 29
End: 2024-05-13

## 2024-05-13 VITALS
DIASTOLIC BLOOD PRESSURE: 68 MMHG | WEIGHT: 241.8 LBS | HEIGHT: 64 IN | BODY MASS INDEX: 41.28 KG/M2 | SYSTOLIC BLOOD PRESSURE: 122 MMHG

## 2024-05-13 DIAGNOSIS — Z34.81 PRENATAL CARE, SUBSEQUENT PREGNANCY, FIRST TRIMESTER: Primary | ICD-10-CM

## 2024-05-13 PROCEDURE — OBC

## 2024-05-13 NOTE — PROGRESS NOTES
OB INTAKE INTERVIEW May 13, 2024    Patient is 28 y.o. who presents for OB intake at 11w6d.  She is accompanied by  her daughter  during this encounter.  The father of her baby (Osmany) is involved in the pregnancy.      Patient's last menstrual period was 2024 (approximate).  Ultrasound: Measured 11 weeks 1 days on 2024  Estimated Date of Delivery: 24 changed by dating ultrasound.    Signs/Symptoms of Pregnancy  Current pregnancy symptoms: none  Constipation no  Headaches no  Cramping/spotting no  PICA cravings no    Diabetes-  Body mass index is 41.5 kg/m².  If patient has 1 or more, please order early 1 hour GTT  History of GDM YES  BMI >35 YES  History of PCOS or current metformin use no  History of LGA/macrosomic infant (4000g/9lbs) no    If patient has 2 or more, please order early 1 hour GTT  BMI>30 YES  AMA no  First degree relative with type 2 diabetes no  History of chronic HTN, hyperlipidemia, elevated A1C YES  High risk race (, , ,  or ) no    Hypertension- if you answer yes to any of the following, please order baseline preeclampsia labs (cbc, comprehensive metabolic panel, urine protein creatinine ratio, uric acid)  History of of chronic HTN YES  History of gestational HTN no  History of preeclampsia, eclampsia, or HELLP syndrome no  History of diabetes no  History of lupus, autoimmune disease, kidney disease no    Thyroid- if yes order TSH with reflex T4  History of thyroid disease YES    Bleeding Disorder or Hx of DVT-patient or first degree relative with history of. Order the following if not done previously.   (Factor V, antithrombin III, prothrombin gene mutation, protein C and S Ag, lupus anticoagulant, anticardiolipin, beta-2 glycoprotein)   no    OB/GYN-  History of abnormal pap smear no       Date of last pap smear 2023  History of HPV no  History of Herpes/HSV h/o cold sores  History of other STI (gonorrhea,  chlamydia, trich) no  History of prior  YES  History of prior  no  History of  delivery prior to 36 weeks 6 days no  History of blood transfusion no  Ok for blood transfusion yes    Substance screening-   History of tobacco use yes  Currently using tobacco no  Currently using alcohol no  Presently using drugs no  Past drug use  no  IV drug use- no  Partner drug use no  Parent/Family drug use no    Substance screening-   History of tobacco use yes  Currently using tobacco no  Substance Use Screen Level - no risk    MRSA Screening-   Does the pt have a hx of MRSA? no    Immunizations:  Influenza vaccine given this season NO   Discussed Tdap vaccine YES   Discussed COVID Vaccine YES x2    Genetic/[facility]-  Do you or your partner have a history of any of the following in yourselves or first degree relatives?  Cystic fibrosis no  Spinal muscular atrophy no  Hemoglobinopathy/Sickle Cell/Thalassemia no  Fragile X Intellectual Disability no    If no, discuss Carrier Screening being completed once in a lifetime as a standard of care lab test. Place orders for Cystic Fibrosis Gene Test (VMW302) and Spinal Muscular Atrophy DNA (IRP1798).  Patient does not desire testing for Cystic Fibrosis and Spinal Muscular Atrophy.  Patient reports already completed with prior pregnancy.    Appointment for Nuchal Translucency Ultrasound at [facility] is scheduled for .    Interview education  [facility] Pregnancy Essentials Book reviewed, discussed and attached to their AVS YES     Prenatal lab work scripts YES    Extra labs ordered: 1 hour GTT, CMP, Protein/creatinine ratio urine, Uric Acid, TSH, and T4 free    Aspirin/Preeclampsia Screen    Risk Level Risk Factor Recommendation   LOW Prior Uncomplicated full-term delivery YES No Aspirin recommendation        MODERATE Nulliparity no Recommend low-dose aspirin if     BMI>30 YES 2 or more moderate risk factors    Family History Preeclampsia (mother/sister) no     35yr old or  greater no      or Low Socioeconomic no     IVF Pregnancy  no     Personal History Risks (low birth weight, prior adverse preg outcome, >10yr preg interval) no         HIGH History of Preeclampsia YES Recommend low-dose aspirin if     Multifetal gestation no 1 or more high risk factors    Chronic HTN YES     Type 1 or 2 Diabetes no     Renal Disease no     Autoimmune Disease  no      Contraindications to ASA therapy:  NSAID/ ASA allergy: no  Nasal polyps: no  Asthma with history of ASA induced bronchospasm: no  Relative contraindications:  History of GI bleed: no  Active peptic ulcer disease: no  Severe hepatic dysfunction: no    Patient does meet recommendation to take ASA 162mg during this pregnancy from 12-36wks to lower her risk of preeclampsia.  Instructions given and patient verbalized understanding.    The patient has a history now or in prior pregnancy notable for: pre-eclampsia in postpartum period, chronic HTN and gestational DM    Details that I feel the provider should be aware of: Anila came in for her OB intake at 11w6d. Patient does not have any current complaints. Patient with h/o  x2, cHTN, GDM and hypothyroidism. Patient developed postpartum preeclampsia with her last pregnancy. Additional labs added to prenatal panel. Patient has NT scheduled .     PN1 visit scheduled. The patient was oriented to our practice, the navigator role, reviewed delivering physicians and [facility] for delivery. All questions were answered.    Interviewed by: Wil Guillen RN

## 2024-05-21 ENCOUNTER — ROUTINE PRENATAL (OUTPATIENT)
Dept: PERINATAL CARE | Facility: OTHER | Age: 29
End: 2024-05-21
Payer: COMMERCIAL

## 2024-05-21 ENCOUNTER — APPOINTMENT (OUTPATIENT)
Dept: LAB | Facility: CLINIC | Age: 29
End: 2024-05-21
Payer: COMMERCIAL

## 2024-05-21 VITALS
HEIGHT: 64 IN | SYSTOLIC BLOOD PRESSURE: 132 MMHG | HEART RATE: 78 BPM | DIASTOLIC BLOOD PRESSURE: 80 MMHG | BODY MASS INDEX: 41.15 KG/M2 | WEIGHT: 241 LBS

## 2024-05-21 DIAGNOSIS — O99.341 DEPRESSION COMPLICATING PREGNANCY, ANTEPARTUM, FIRST TRIMESTER: ICD-10-CM

## 2024-05-21 DIAGNOSIS — O99.281 HYPOTHYROIDISM IN PREGNANCY, ANTEPARTUM, FIRST TRIMESTER: ICD-10-CM

## 2024-05-21 DIAGNOSIS — E03.9 HYPOTHYROIDISM IN PREGNANCY, ANTEPARTUM, FIRST TRIMESTER: ICD-10-CM

## 2024-05-21 DIAGNOSIS — O10.011: ICD-10-CM

## 2024-05-21 DIAGNOSIS — Z86.32 HISTORY OF GESTATIONAL DIABETES IN PRIOR PREGNANCY, CURRENTLY PREGNANT IN FIRST TRIMESTER: ICD-10-CM

## 2024-05-21 DIAGNOSIS — F32.A DEPRESSION COMPLICATING PREGNANCY, ANTEPARTUM, FIRST TRIMESTER: ICD-10-CM

## 2024-05-21 DIAGNOSIS — Z3A.13 13 WEEKS GESTATION OF PREGNANCY: ICD-10-CM

## 2024-05-21 DIAGNOSIS — Z34.81 PRENATAL CARE, SUBSEQUENT PREGNANCY, FIRST TRIMESTER: ICD-10-CM

## 2024-05-21 DIAGNOSIS — O99.211 MATERNAL OBESITY, ANTEPARTUM, FIRST TRIMESTER: ICD-10-CM

## 2024-05-21 DIAGNOSIS — N91.2 AMENORRHEA: ICD-10-CM

## 2024-05-21 DIAGNOSIS — O09.291 HISTORY OF GESTATIONAL DIABETES IN PRIOR PREGNANCY, CURRENTLY PREGNANT IN FIRST TRIMESTER: ICD-10-CM

## 2024-05-21 DIAGNOSIS — Z36.82 ENCOUNTER FOR ANTENATAL SCREENING FOR NUCHAL TRANSLUCENCY: Primary | ICD-10-CM

## 2024-05-21 DIAGNOSIS — O09.291 HX OF PREECLAMPSIA, PRIOR PREGNANCY, CURRENTLY PREGNANT, FIRST TRIMESTER: ICD-10-CM

## 2024-05-21 LAB
ABO GROUP BLD: NORMAL
ALBUMIN SERPL BCP-MCNC: 3.6 G/DL (ref 3.5–5)
ALP SERPL-CCNC: 53 U/L (ref 34–104)
ALT SERPL W P-5'-P-CCNC: 11 U/L (ref 7–52)
ANION GAP SERPL CALCULATED.3IONS-SCNC: 11 MMOL/L (ref 4–13)
AST SERPL W P-5'-P-CCNC: 16 U/L (ref 13–39)
BASOPHILS # BLD AUTO: 0.04 THOUSANDS/ÂΜL (ref 0–0.1)
BASOPHILS NFR BLD AUTO: 0 % (ref 0–1)
BILIRUB SERPL-MCNC: 0.32 MG/DL (ref 0.2–1)
BILIRUB UR QL STRIP: NEGATIVE
BLD GP AB SCN SERPL QL: NEGATIVE
BUN SERPL-MCNC: 9 MG/DL (ref 5–25)
CALCIUM SERPL-MCNC: 8.7 MG/DL (ref 8.4–10.2)
CHLORIDE SERPL-SCNC: 98 MMOL/L (ref 96–108)
CLARITY UR: CLEAR
CO2 SERPL-SCNC: 24 MMOL/L (ref 21–32)
COLOR UR: NORMAL
CREAT SERPL-MCNC: 0.63 MG/DL (ref 0.6–1.3)
CREAT UR-MCNC: 38.2 MG/DL
EOSINOPHIL # BLD AUTO: 0.07 THOUSAND/ÂΜL (ref 0–0.61)
EOSINOPHIL NFR BLD AUTO: 1 % (ref 0–6)
ERYTHROCYTE [DISTWIDTH] IN BLOOD BY AUTOMATED COUNT: 13.2 % (ref 11.6–15.1)
GFR SERPL CREATININE-BSD FRML MDRD: 122 ML/MIN/1.73SQ M
GLUCOSE 1H P 50 G GLC PO SERPL-MCNC: 167 MG/DL (ref 70–134)
GLUCOSE SERPL-MCNC: 174 MG/DL (ref 65–140)
GLUCOSE UR STRIP-MCNC: NEGATIVE MG/DL
HCT VFR BLD AUTO: 36.3 % (ref 34.8–46.1)
HGB BLD-MCNC: 11.7 G/DL (ref 11.5–15.4)
HGB UR QL STRIP.AUTO: NEGATIVE
IMM GRANULOCYTES # BLD AUTO: 0.05 THOUSAND/UL (ref 0–0.2)
IMM GRANULOCYTES NFR BLD AUTO: 1 % (ref 0–2)
KETONES UR STRIP-MCNC: NEGATIVE MG/DL
LEUKOCYTE ESTERASE UR QL STRIP: NEGATIVE
LYMPHOCYTES # BLD AUTO: 0.88 THOUSANDS/ÂΜL (ref 0.6–4.47)
LYMPHOCYTES NFR BLD AUTO: 9 % (ref 14–44)
MCH RBC QN AUTO: 30.5 PG (ref 26.8–34.3)
MCHC RBC AUTO-ENTMCNC: 32.2 G/DL (ref 31.4–37.4)
MCV RBC AUTO: 95 FL (ref 82–98)
MONOCYTES # BLD AUTO: 0.42 THOUSAND/ÂΜL (ref 0.17–1.22)
MONOCYTES NFR BLD AUTO: 4 % (ref 4–12)
NEUTROPHILS # BLD AUTO: 8.1 THOUSANDS/ÂΜL (ref 1.85–7.62)
NEUTS SEG NFR BLD AUTO: 85 % (ref 43–75)
NITRITE UR QL STRIP: NEGATIVE
NRBC BLD AUTO-RTO: 0 /100 WBCS
PH UR STRIP.AUTO: 7 [PH]
PLATELET # BLD AUTO: 330 THOUSANDS/UL (ref 149–390)
PMV BLD AUTO: 9.8 FL (ref 8.9–12.7)
POTASSIUM SERPL-SCNC: 3.5 MMOL/L (ref 3.5–5.3)
PROT SERPL-MCNC: 7 G/DL (ref 6.4–8.4)
PROT UR STRIP-MCNC: NEGATIVE MG/DL
PROT UR-MCNC: <4 MG/DL
RBC # BLD AUTO: 3.83 MILLION/UL (ref 3.81–5.12)
RH BLD: POSITIVE
RUBV IGG SERPL IA-ACNC: 29.8 IU/ML
SODIUM SERPL-SCNC: 133 MMOL/L (ref 135–147)
SP GR UR STRIP.AUTO: 1.01 (ref 1–1.03)
SPECIMEN EXPIRATION DATE: NORMAL
T4 FREE SERPL-MCNC: 0.59 NG/DL (ref 0.61–1.12)
TSH SERPL DL<=0.05 MIU/L-ACNC: 5.8 UIU/ML (ref 0.45–4.5)
URATE SERPL-MCNC: 4.4 MG/DL (ref 2–7.5)
UROBILINOGEN UR STRIP-ACNC: <2 MG/DL
WBC # BLD AUTO: 9.56 THOUSAND/UL (ref 4.31–10.16)

## 2024-05-21 PROCEDURE — 85025 COMPLETE CBC W/AUTO DIFF WBC: CPT

## 2024-05-21 PROCEDURE — 86706 HEP B SURFACE ANTIBODY: CPT

## 2024-05-21 PROCEDURE — 99243 OFF/OP CNSLTJ NEW/EST LOW 30: CPT | Performed by: OBSTETRICS & GYNECOLOGY

## 2024-05-21 PROCEDURE — 87086 URINE CULTURE/COLONY COUNT: CPT

## 2024-05-21 PROCEDURE — 82570 ASSAY OF URINE CREATININE: CPT

## 2024-05-21 PROCEDURE — 87389 HIV-1 AG W/HIV-1&-2 AB AG IA: CPT

## 2024-05-21 PROCEDURE — 84439 ASSAY OF FREE THYROXINE: CPT

## 2024-05-21 PROCEDURE — 36415 COLL VENOUS BLD VENIPUNCTURE: CPT

## 2024-05-21 PROCEDURE — 87340 HEPATITIS B SURFACE AG IA: CPT

## 2024-05-21 PROCEDURE — 80053 COMPREHEN METABOLIC PANEL: CPT

## 2024-05-21 PROCEDURE — 86780 TREPONEMA PALLIDUM: CPT

## 2024-05-21 PROCEDURE — 84156 ASSAY OF PROTEIN URINE: CPT

## 2024-05-21 PROCEDURE — 86901 BLOOD TYPING SEROLOGIC RH(D): CPT

## 2024-05-21 PROCEDURE — 86803 HEPATITIS C AB TEST: CPT

## 2024-05-21 PROCEDURE — 84443 ASSAY THYROID STIM HORMONE: CPT

## 2024-05-21 PROCEDURE — 86900 BLOOD TYPING SEROLOGIC ABO: CPT

## 2024-05-21 PROCEDURE — 76813 OB US NUCHAL MEAS 1 GEST: CPT | Performed by: OBSTETRICS & GYNECOLOGY

## 2024-05-21 PROCEDURE — 81003 URINALYSIS AUTO W/O SCOPE: CPT

## 2024-05-21 PROCEDURE — 86762 RUBELLA ANTIBODY: CPT

## 2024-05-21 PROCEDURE — 86850 RBC ANTIBODY SCREEN: CPT

## 2024-05-21 PROCEDURE — 82950 GLUCOSE TEST: CPT

## 2024-05-21 PROCEDURE — 76801 OB US < 14 WKS SINGLE FETUS: CPT | Performed by: OBSTETRICS & GYNECOLOGY

## 2024-05-21 PROCEDURE — 84550 ASSAY OF BLOOD/URIC ACID: CPT

## 2024-05-21 NOTE — LETTER
May 21, 2024     Mare Centeno MD  [address] [address]    Patient: Anila Cheng   YOB: 1995   Date of Visit: 5/21/2024       Dear Dr. Centeno:    Thank you for referring Anila Cheng to me for evaluation. Below are my notes for this consultation.    If you have questions, please do not hesitate to call me. I look forward to following your patient along with you.         Sincerely,        Stephon Little MD        CC: No Recipients    Stephon Little MD  5/21/2024  7:54 AM  Sign when Signing Visit  Please refer to the [facility] ultrasound report in Ob Procedures for additional information regarding today's visit

## 2024-05-21 NOTE — PROGRESS NOTES
Please refer to the [facility] ultrasound report in Ob Procedures for additional information regarding today's visit

## 2024-05-22 LAB
BACTERIA UR CULT: NORMAL
HBV SURFACE AB SER-ACNC: 12 MIU/ML
HBV SURFACE AG SER QL: NORMAL
HCV AB SER QL: NORMAL
HIV 1+2 AB+HIV1 P24 AG SERPL QL IA: NORMAL
HIV 2 AB SERPL QL IA: NORMAL
HIV1 AB SERPL QL IA: NORMAL
HIV1 P24 AG SERPL QL IA: NORMAL
TREPONEMA PALLIDUM IGG+IGM AB [PRESENCE] IN SERUM OR PLASMA BY IMMUNOASSAY: NORMAL

## 2024-05-30 ENCOUNTER — TELEPHONE (OUTPATIENT)
Dept: OBGYN CLINIC | Facility: MEDICAL CENTER | Age: 29
End: 2024-05-30

## 2024-05-30 ENCOUNTER — INITIAL PRENATAL (OUTPATIENT)
Dept: OBGYN CLINIC | Facility: CLINIC | Age: 29
End: 2024-05-30

## 2024-05-30 VITALS — WEIGHT: 241.4 LBS | DIASTOLIC BLOOD PRESSURE: 80 MMHG | BODY MASS INDEX: 41.44 KG/M2 | SYSTOLIC BLOOD PRESSURE: 122 MMHG

## 2024-05-30 DIAGNOSIS — O99.810 IMPAIRED GLUCOSE IN PREGNANCY, ANTEPARTUM: ICD-10-CM

## 2024-05-30 DIAGNOSIS — Z34.82 ENCOUNTER FOR SUPERVISION OF OTHER NORMAL PREGNANCY IN SECOND TRIMESTER: Primary | ICD-10-CM

## 2024-05-30 DIAGNOSIS — Z3A.14 14 WEEKS GESTATION OF PREGNANCY: ICD-10-CM

## 2024-05-30 DIAGNOSIS — E03.9 ACQUIRED HYPOTHYROIDISM: ICD-10-CM

## 2024-05-30 PROBLEM — N61.0 MASTITIS: Status: RESOLVED | Noted: 2021-03-16 | Resolved: 2024-05-30

## 2024-05-30 PROCEDURE — PNV: Performed by: OBSTETRICS & GYNECOLOGY

## 2024-05-30 PROCEDURE — 87491 CHLMYD TRACH DNA AMP PROBE: CPT | Performed by: OBSTETRICS & GYNECOLOGY

## 2024-05-30 PROCEDURE — 87591 N.GONORRHOEAE DNA AMP PROB: CPT | Performed by: OBSTETRICS & GYNECOLOGY

## 2024-05-30 RX ORDER — LEVOTHYROXINE SODIUM 0.1 MG/1
100 TABLET ORAL DAILY
Qty: 90 TABLET | Refills: 3 | Status: SHIPPED | OUTPATIENT
Start: 2024-05-30

## 2024-05-30 NOTE — PROGRESS NOTES
Routine Prenatal Visit  [facility] [address] [facility] [place]    Assessment/Plan:  Anila is a 28 y.o. year old  at 14w2d who presents for routine prenatal visit.     1. 14 weeks gestation of pregnancy  -     Chlamydia/GC amplified DNA by PCR  2. Acquired hypothyroidism  -     levothyroxine (Euthyrox) 100 mcg tablet; Take 1 tablet (100 mcg total) by mouth daily  -     TSH, 3rd generation; Future  -     T4, free; Future  3. Impaired glucose in pregnancy, antepartum  Assessment & Plan:  History of GDMA1  Elevated 1 hour glucola  Declined 3 hour   Will monitor sugars, fasting and 2 hour postprandial        Subjective:     CC: Prenatal care    Anila Cheng is a 28 y.o.  female who presents for routine prenatal care at 14w2d.  Pregnancy ROS: no leakage of fluid, pelvic pain, or vaginal bleeding.  no fetal movement yet.  Did a sneak peak, it's a girl  Elevated TSH, synthroid increased to 100 mcg  Declined msAFP  The following portions of the patient's history were reviewed and updated as appropriate: allergies, current medications, past family history, past medical history, obstetric history, gynecologic history, past social history, past surgical history and problem list.      Objective:  /80   Wt 109 kg (241 lb 6.4 oz)   LMP 2024 (Approximate)   BMI 41.44 kg/m²   Pregravid Weight/BMI: 102 kg (224 lb) (BMI 38.43)  Current Weight: 109 kg (241 lb 6.4 oz)   Total Weight Gain: 7.893 kg (17 lb 6.4 oz)   Pre-Herbert Vitals      Flowsheet Row Most Recent Value   Prenatal Assessment    Fetal Heart Rate 155   Prenatal Vitals    Blood Pressure 122/80   Weight - Scale 109 kg (241 lb 6.4 oz)   Urine Albumin/Glucose    Dilation/Effacement/Station    Vaginal Drainage    Draining Fluid No   Edema    LLE Edema None             General: Well appearing, no distress  Respiratory: Unlabored breathing  Cardiovascular: Regular rate.  Abdomen: Soft, gravid, nontender  Fundal  Height: Appropriate for gestational age.  Extremities: Warm and well perfused.  Non tender.

## 2024-05-30 NOTE — ASSESSMENT & PLAN NOTE
History of GDMA1  Elevated 1 hour glucola  Declined 3 hour   Will monitor sugars, fasting and 2 hour postprandial

## 2024-05-30 NOTE — TELEPHONE ENCOUNTER
Attempted to contact patient in regards to her diabetic supplies.  Pt unavailable, left message that supplies would sent to [facility] in [place]  ----- Message from Radha Mendes MD sent at 5/30/2024  2:07 PM EDT -----  Patient had elevated 1 hour, was diabetic in her last pregnancy. Wants to continue monitoring sugars. Can you call in a glucometer, lancets, alcohol swabs, and anything else she needs.

## 2024-05-31 LAB
C TRACH DNA SPEC QL NAA+PROBE: NEGATIVE
N GONORRHOEA DNA SPEC QL NAA+PROBE: NEGATIVE

## 2024-06-20 ENCOUNTER — TELEPHONE (OUTPATIENT)
Dept: PERINATAL CARE | Facility: OTHER | Age: 29
End: 2024-06-20

## 2024-06-20 NOTE — TELEPHONE ENCOUNTER
SUJEYM 6/20 at 1510 notifying patient of [facility] office closure on 7/17 so her appointment has been re scheduled to 7/18 at 1015 at -[facility] office. Provided # [phone] to patient if this appointment needs changing.

## 2024-06-25 ENCOUNTER — TELEPHONE (OUTPATIENT)
Dept: OBGYN CLINIC | Facility: MEDICAL CENTER | Age: 29
End: 2024-06-25

## 2024-06-25 DIAGNOSIS — Z3A.18 18 WEEKS GESTATION OF PREGNANCY: Primary | ICD-10-CM

## 2024-06-25 NOTE — TELEPHONE ENCOUNTER
2ND TRIMESTER CHECK-IN CALL      Overall how are you doing? Patient reports to be doing well.     Compliant with routine OB care appointments? Yes.     Have you completed your 1st trimester labs? Yes.     If you had NIPS with MFM, do you have a order for MSAFP? Patient declined NIPT but agreeable to AFP. Order placed. Patient aware can be completed until 21w6d.      Have you seen MFM and do you have your detailed US scheduled? Yes. Patient has level ll scheduled on 7/18.     Pregnancy Education-have you had a chance to review the classes offered and registered? Patient not interested at this time.     EPDS Score: 0

## 2024-06-27 ENCOUNTER — ROUTINE PRENATAL (OUTPATIENT)
Dept: OBGYN CLINIC | Facility: CLINIC | Age: 29
End: 2024-06-27

## 2024-06-27 VITALS
SYSTOLIC BLOOD PRESSURE: 124 MMHG | WEIGHT: 234 LBS | HEIGHT: 64 IN | BODY MASS INDEX: 39.95 KG/M2 | DIASTOLIC BLOOD PRESSURE: 76 MMHG

## 2024-06-27 DIAGNOSIS — Z3A.18 18 WEEKS GESTATION OF PREGNANCY: ICD-10-CM

## 2024-06-27 DIAGNOSIS — O99.810 IMPAIRED GLUCOSE IN PREGNANCY, ANTEPARTUM: ICD-10-CM

## 2024-06-27 DIAGNOSIS — Z34.92 SECOND TRIMESTER PREGNANCY: Primary | ICD-10-CM

## 2024-06-27 DIAGNOSIS — E03.9 HYPOTHYROIDISM, UNSPECIFIED TYPE: ICD-10-CM

## 2024-06-27 PROCEDURE — PNV: Performed by: OBSTETRICS & GYNECOLOGY

## 2024-06-27 NOTE — PROGRESS NOTES
"Assessment  28 y.o.  at 18w2d presenting for routine prenatal visit.     Plan  Diagnoses and all orders for this visit:    Second trimester pregnancy    18 weeks gestation of pregnancy  - Second trimester precautions  - Normal movement  - Level 2 scheduled  - NIPT/AFP declined  - Return in 4wk for PN    Impaired glucose in pregnancy, antepartum  - Declined 3hr  - Monitoring fingersticks    Hypothyroidism, unspecified type  - Abnormal testing noted; levothyroxine increased from 88mcg to 100mcg  - Encouraged to complete f/u testing      ____________________________________________________________        Subjective    Anila Cheng is a 28 y.o.  at 18w2d who presents for routine prenatal visit. She is without complaint. Feels much better since levothyroxine dose adjusted; felt very fatigued prior to this. She denies contractions, loss of fluid, or vaginal bleeding. She feels regular fetal movements.     Pregnancy Problems:  Patient Active Problem List   Diagnosis    Hypothyroid    Bradycardia    Elevated brain natriuretic peptide (BNP) level    Impaired glucose in pregnancy, antepartum         Objective  /76   Ht 5' 4\" (1.626 m)   Wt 106 kg (234 lb)   LMP 2024 (Approximate)   BMI 40.17 kg/m²     FHT: 140 BPM   Uterine Size: size equals dates     Physical Exam:  Physical Exam  Constitutional:       General: She is not in acute distress.     Appearance: Normal appearance. She is well-developed. She is not ill-appearing, toxic-appearing or diaphoretic.   HENT:      Head: Normocephalic and atraumatic.   Eyes:      General: No scleral icterus.        Right eye: No discharge.         Left eye: No discharge.      Conjunctiva/sclera: Conjunctivae normal.   Pulmonary:      Effort: Pulmonary effort is normal. No accessory muscle usage or respiratory distress.   Abdominal:      General: There is distension (gravid).      Tenderness: There is no abdominal tenderness. There is no guarding or " rebound.   Skin:     General: Skin is warm and dry.      Coloration: Skin is not jaundiced.      Findings: No bruising, erythema or rash.   Neurological:      Mental Status: She is alert.   Psychiatric:         Mood and Affect: Mood normal.         Behavior: Behavior normal.         Thought Content: Thought content normal.         Judgment: Judgment normal.

## 2024-07-02 ENCOUNTER — APPOINTMENT (OUTPATIENT)
Dept: LAB | Facility: CLINIC | Age: 29
End: 2024-07-02
Payer: COMMERCIAL

## 2024-07-02 DIAGNOSIS — E03.9 ACQUIRED HYPOTHYROIDISM: ICD-10-CM

## 2024-07-02 LAB
T4 FREE SERPL-MCNC: 0.55 NG/DL (ref 0.61–1.12)
TSH SERPL DL<=0.05 MIU/L-ACNC: 4.41 UIU/ML (ref 0.45–4.5)

## 2024-07-02 PROCEDURE — 84439 ASSAY OF FREE THYROXINE: CPT

## 2024-07-02 PROCEDURE — 84443 ASSAY THYROID STIM HORMONE: CPT

## 2024-07-02 PROCEDURE — 36415 COLL VENOUS BLD VENIPUNCTURE: CPT

## 2024-07-03 ENCOUNTER — TELEPHONE (OUTPATIENT)
Age: 29
End: 2024-07-03

## 2024-07-03 DIAGNOSIS — E03.9 ACQUIRED HYPOTHYROIDISM: ICD-10-CM

## 2024-07-03 RX ORDER — LEVOTHYROXINE SODIUM 112 UG/1
112 TABLET ORAL DAILY
Qty: 30 TABLET | Refills: 1 | Status: SHIPPED | OUTPATIENT
Start: 2024-07-03

## 2024-07-03 NOTE — TELEPHONE ENCOUNTER
Patient was notified of Dr. Porter' recommendations and the a prescription was sent to her pharmacy. Patient verbalized understanding, no further questions at this time.

## 2024-07-03 NOTE — TELEPHONE ENCOUNTER
Patient called in stating she is 19w1d pregnant and she had completed thyroid blood work testing and patient reviewed the results and would like further recommendations. Patient was notified that a message will be sent to the provider and then patient will receive a call back with further instructions from the provider, pt verbalized understanding no further questions at this time.

## 2024-07-03 NOTE — TELEPHONE ENCOUNTER
Labs remain abnormal, indicating further need for dose increase. I sent in levothyroxine 112mcg tablets for her. Recheck labs in 1mo.     Rx sent as 30d rx due to adjustment ongoing. Can resend at 90d once we're on the right dose.

## 2024-07-18 ENCOUNTER — ROUTINE PRENATAL (OUTPATIENT)
Dept: PERINATAL CARE | Facility: OTHER | Age: 29
End: 2024-07-18
Payer: COMMERCIAL

## 2024-07-18 VITALS
DIASTOLIC BLOOD PRESSURE: 70 MMHG | WEIGHT: 231.2 LBS | HEIGHT: 65 IN | HEART RATE: 77 BPM | SYSTOLIC BLOOD PRESSURE: 110 MMHG | BODY MASS INDEX: 38.52 KG/M2

## 2024-07-18 DIAGNOSIS — Z36.86 ENCOUNTER FOR ANTENATAL SCREENING FOR CERVICAL LENGTH: ICD-10-CM

## 2024-07-18 DIAGNOSIS — O99.282 HYPOTHYROID IN PREGNANCY, ANTEPARTUM, SECOND TRIMESTER: ICD-10-CM

## 2024-07-18 DIAGNOSIS — O99.212 OTHER OBESITY DUE TO EXCESS CALORIES AFFECTING PREGNANCY IN SECOND TRIMESTER: ICD-10-CM

## 2024-07-18 DIAGNOSIS — E66.09 OTHER OBESITY DUE TO EXCESS CALORIES AFFECTING PREGNANCY IN SECOND TRIMESTER: ICD-10-CM

## 2024-07-18 DIAGNOSIS — E03.9 HYPOTHYROID IN PREGNANCY, ANTEPARTUM, SECOND TRIMESTER: ICD-10-CM

## 2024-07-18 DIAGNOSIS — O24.410 DIET CONTROLLED GESTATIONAL DIABETES MELLITUS (GDM) IN SECOND TRIMESTER: Primary | ICD-10-CM

## 2024-07-18 DIAGNOSIS — Z3A.21 21 WEEKS GESTATION OF PREGNANCY: ICD-10-CM

## 2024-07-18 PROBLEM — O99.810 IMPAIRED GLUCOSE IN PREGNANCY, ANTEPARTUM: Status: RESOLVED | Noted: 2024-05-30 | Resolved: 2024-07-18

## 2024-07-18 PROCEDURE — 99214 OFFICE O/P EST MOD 30 MIN: CPT | Performed by: OBSTETRICS & GYNECOLOGY

## 2024-07-18 PROCEDURE — 76811 OB US DETAILED SNGL FETUS: CPT | Performed by: OBSTETRICS & GYNECOLOGY

## 2024-07-18 PROCEDURE — 76817 TRANSVAGINAL US OBSTETRIC: CPT | Performed by: OBSTETRICS & GYNECOLOGY

## 2024-07-18 NOTE — LETTER
July 18, 2024     Payal Heart CNM  [address]  [address]  [facility] [address]    Patient: Anila Cheng   YOB: 1995   Date of Visit: 7/18/2024       Dear Dr. Heart:    Thank you for referring Anila Cheng to me for evaluation. Below are my notes for this consultation.    If you have questions, please do not hesitate to call me. I look forward to following your patient along with you.         Sincerely,        Yandy Knutson MD        CC: STEPHANIE Mccain MD  7/18/2024  7:42 PM  Sign when Signing Visit  Anila Cheng  has no complaints today at 21w2d. She reports fetal movements and does not report any vaginal bleeding or signs of labor.  She did not complete screening for MSAFP or NIPT. She is here today for an ultrasound for fetal anatomy.    Problem list:  History of gestational diabetes managed by diet in a previous pregnancy.  Gestational diabetes diagnosed in this pregnancy based on elevated Glucola and she has been monitoring her own sugars and reports normal fasting blood sugars and elevated 1 hour postprandial blood sugars greater than 140 if she is not watching her diet.   History of gestational hypertension in a prior pregnancy and then required 6 months of blood pressure medication after delivery suggesting that she may have chronic hypertension.  Hypothyroidism . Her TSH was 4.4 on 7/2/2024 for which she was increased on her levothyroxine to 112 mcg.     Ultrasound findings:  The ultrasound today shows normal interval fetal growth and fluid, normal cervical length, and no malformations were detected.    Pregnancy ultrasound has limitations and is unable to detect all forms of fetal congenital abnormalities.      Specific counseling was provided on the following problems:    I we will have her schedule for diabetes education.  I ordered the glucose flowsheet for her to start inputting her blood sugars for diabetes education to review at her  first class. if 30% or more FBS are >95 or 1hr pp are > 140 or 2 hr pp are >120 she will be started on insulin or a oral hypoglycemic such as metformin.  If medications are required to control her diabetes she will require twice weekly fetal testing with NST's from 32 weeks on. I would also recommend delivery around her due date.  If she does not require any medications to control her diabetes then she can start fetal testing at 40 weeks and be delivered by 41 weeks.  Postnatally after delivery, most patients with gestational diabetes can stop their insulin and gestational diabetes diet. Recommend she complete a 2 hour glucose tolerance test at 6 weeks postpartum.  Patients with gestational diabetes have a higher risk for developing overt diabetes in the future. Recommend she be screened for diabetes yearly.      Follow up recommended:   Recommend a fetal echo in 4 weeks along with review of the missed anatomy since she has early onset gestational diabetes.  Recommend a growth scan in 8 weeks.  Referred to diabetes education  I ordered a blood glucose flowsheet she will start filling out in preparation for her diabetes education class.  We reviewed how to monitor her blood sugars.  Fasting blood sugar should be 8 to 10 hours after her last meal the night before.  1 hour or 2-hour blood sugars should be measured from the first bite of food and she should finish her meal within 15 minutes and not have any snacks until after her blood sugars are checked.    Pre visit time reviewing her records   10 minutes  Face to face time 15 minutes  Post visit time on documentation of note, updating her problem list, adding orders and prescriptions 10 minutes.  Procedures that were completed today were charged separately.   The level of decision making was moderate complexity.    MD Adriana Temple  7/18/2024 10:29 AM  Sign when Signing Visit  Ultrasound Probe Disinfection    A transvaginal ultrasound  was performed.   Prior to use, disinfection was performed with High Level Disinfection Process ([facility] [employer]).  Probe serial number M2: [id number] was used.      Adriana Machado  07/18/24  10:29 AM

## 2024-07-18 NOTE — PROGRESS NOTES
Ultrasound Probe Disinfection    A transvaginal ultrasound was performed.   Prior to use, disinfection was performed with High Level Disinfection Process ([employer]).  Probe serial number M2: [id number] was used.      Adriana Machado  07/18/24  10:29 AM

## 2024-07-18 NOTE — PROGRESS NOTES
Anila Cheng  has no complaints today at 21w2d. She reports fetal movements and does not report any vaginal bleeding or signs of labor.  She did not complete screening for MSAFP or NIPT. She is here today for an ultrasound for fetal anatomy.    Problem list:  History of gestational diabetes managed by diet in a previous pregnancy.  Gestational diabetes diagnosed in this pregnancy based on elevated Glucola and she has been monitoring her own sugars and reports normal fasting blood sugars and elevated 1 hour postprandial blood sugars greater than 140 if she is not watching her diet.   History of gestational hypertension in a prior pregnancy and then required 6 months of blood pressure medication after delivery suggesting that she may have chronic hypertension.  Hypothyroidism . Her TSH was 4.4 on 7/2/2024 for which she was increased on her levothyroxine to 112 mcg.     Ultrasound findings:  The ultrasound today shows normal interval fetal growth and fluid, normal cervical length, and no malformations were detected.    Pregnancy ultrasound has limitations and is unable to detect all forms of fetal congenital abnormalities.      Specific counseling was provided on the following problems:    I we will have her schedule for diabetes education.  I ordered the glucose flowsheet for her to start inputting her blood sugars for diabetes education to review at her first class. if 30% or more FBS are >95 or 1hr pp are > 140 or 2 hr pp are >120 she will be started on insulin or a oral hypoglycemic such as metformin.  If medications are required to control her diabetes she will require twice weekly fetal testing with NST's from 32 weeks on. I would also recommend delivery around her due date.  If she does not require any medications to control her diabetes then she can start fetal testing at 40 weeks and be delivered by 41 weeks.  Postnatally after delivery, most patients with gestational diabetes can stop their insulin  and gestational diabetes diet. Recommend she complete a 2 hour glucose tolerance test at 6 weeks postpartum.  Patients with gestational diabetes have a higher risk for developing overt diabetes in the future. Recommend she be screened for diabetes yearly.      Follow up recommended:   Recommend a fetal echo in 4 weeks along with review of the missed anatomy since she has early onset gestational diabetes.  Recommend a growth scan in 8 weeks.  Referred to diabetes education  I ordered a blood glucose flowsheet she will start filling out in preparation for her diabetes education class.  We reviewed how to monitor her blood sugars.  Fasting blood sugar should be 8 to 10 hours after her last meal the night before.  1 hour or 2-hour blood sugars should be measured from the first bite of food and she should finish her meal within 15 minutes and not have any snacks until after her blood sugars are checked.    Pre visit time reviewing her records   10 minutes  Face to face time 15 minutes  Post visit time on documentation of note, updating her problem list, adding orders and prescriptions 10 minutes.  Procedures that were completed today were charged separately.   The level of decision making was moderate complexity.    Yandy Knutson MD

## 2024-07-19 NOTE — PATIENT INSTRUCTIONS
-Check A1c. Last CMP within normal.   -A1c goal is 5.6% or less.  -Follow up with dietitian with dietitian as needed.   -Self monitoring blood glucose (SMBG) fasting; 1 hour after start of each meal and with hypoglycemia.   -Glucose goals: fasting 60-95 mg/dL, 140 mg/dL or less 1 hour post meals, and 120 mg/dL or less 2 hours post meal.   -Report glucose readings weekly via [employer].  -Start GDM calorie meal plan with 3 meals and 3 snacks including recommended combination of carb, protein and fat per meal/snack.  -Please eat meal or snack every 2-3.5 hours while awake.  -No more than 8 to 10 hours of fasting overnight.  -Refer to [employer] MyPlate online as a reference.  -2nd/3rd trimester minimum total daily carbohydrates 175 grams paired with half grams in protein.   -Stay active if no restriction from your OB, walk up to 30 minutes a day.  -Always have glucose available to treat hypoglycemia. Use 15:15 rule.   -Refer to hypoglycemia patient education sheet. SMBG when experiencing signs and symptoms of hypoglycemia and prior to driving.   -Serial fetal growth ultrasounds.  -20 weeks detailed fetal growth ultrasound completed.  -22-24 weeks fetal echo as scheduled.  -If diabetes related medications are started, at 32 weeks gestation; NST twice a week and NINA weekly.   -Continue prenatal vitamin and baby aspirin as recommended.  -At 36 weeks gestation, stop baby aspirin.   -Continue follow-up with your OB and MFM as recommended.  -Stay in close contact with diabetes education team.  -Follow up as scheduled in 2024.   -Insulin requirements during pregnancy; basal/bolus concept and Metformin discussed.  -Very important to maintain tight glucose control during pregnancy to decrease risk factors including fetal macrosomia; birth injury; risk of ; polyhydramnios; pre-term labor; pre-eclampsia;  hypoglycemia; jaundice and stillbirth.   -Diabetes and pregnancy booklet; meal plan and hypoglycemia  patient education.    -4 to 12 weeks postpartum complete 2-hour glucose tolerance test for diabetes screening.   -Postpartum continue with a healthy lifestyle to decrease risk of developing type 2 diabetes.     Thank you for choosing us for your  care today.  If you have any questions about your ultrasound or care, please do not hesitate to contact us or your primary obstetrician.        Some general instructions for your pregnancy are:    Exercise: Aim for 150 minutes per week of regular exercise.  Walking is great!  Nutrition: Choose healthy sources of calcium, iron, and protein.  Avoid ultraprocessed foods and added sugar.  Learn about Preeclampsia: preeclampsia is a common, potentially serious high blood pressure complication in pregnancy.  A blood pressure of 140mmHg (systolic or top number) or 90mmHg (diastolic or bottom number) should be evaluated by your doctor.  Aspirin is sometimes prescribed in early pregnancy to prevent preeclampsia in women with risk factors - ask your obstetrician if you should be on this medication.  For more resources, visit:  https://www.highriskpregnancyinfo.org/preeclampsia  If you smoke, please try to quit completely but also try to reduce your smoking by as much as possible (as soon as possible).  Do not vape.  Please also avoid cannabis products.  Other warning signs to watch out for in pregnancy or postpartum: chest pain, obstructed breathing or shortness of breath, seizures, thoughts of hurting yourself or your baby, bleeding, a painful or swollen leg, fever, or headache (see AWHONN POST-BIRTH Warning Signs campaign).  If these happen call 911.  Itching is also not normal in pregnancy and if you experience this, especially over your hands and feet, potentially worse at night, notify your doctors.

## 2024-07-23 PROBLEM — Z3A.22 22 WEEKS GESTATION OF PREGNANCY: Status: ACTIVE | Noted: 2024-07-23

## 2024-07-24 ENCOUNTER — TELEMEDICINE (OUTPATIENT)
Facility: HOSPITAL | Age: 29
End: 2024-07-24
Payer: COMMERCIAL

## 2024-07-24 DIAGNOSIS — O24.410 DIET CONTROLLED GESTATIONAL DIABETES MELLITUS (GDM) IN SECOND TRIMESTER: Primary | ICD-10-CM

## 2024-07-24 DIAGNOSIS — E03.9 HYPOTHYROID IN PREGNANCY, ANTEPARTUM, SECOND TRIMESTER: ICD-10-CM

## 2024-07-24 DIAGNOSIS — O99.212 OBESITY AFFECTING PREGNANCY IN SECOND TRIMESTER, UNSPECIFIED OBESITY TYPE: ICD-10-CM

## 2024-07-24 DIAGNOSIS — Z3A.22 22 WEEKS GESTATION OF PREGNANCY: ICD-10-CM

## 2024-07-24 DIAGNOSIS — O99.282 HYPOTHYROID IN PREGNANCY, ANTEPARTUM, SECOND TRIMESTER: ICD-10-CM

## 2024-07-24 PROCEDURE — 99215 OFFICE O/P EST HI 40 MIN: CPT | Performed by: NURSE PRACTITIONER

## 2024-07-24 PROCEDURE — 99417 PROLNG OP E/M EACH 15 MIN: CPT | Performed by: NURSE PRACTITIONER

## 2024-07-24 RX ORDER — LANCETS 33 GAUGE
EACH MISCELLANEOUS
COMMUNITY
Start: 2024-06-28 | End: 2024-07-24 | Stop reason: SDUPTHER

## 2024-07-24 RX ORDER — LANCETS 33 GAUGE
EACH MISCELLANEOUS
Qty: 100 EACH | Refills: 4 | Status: SHIPPED | OUTPATIENT
Start: 2024-07-24

## 2024-07-24 RX ORDER — BLOOD SUGAR DIAGNOSTIC
STRIP MISCELLANEOUS
Qty: 100 STRIP | Refills: 4 | Status: SHIPPED | OUTPATIENT
Start: 2024-07-24

## 2024-07-24 RX ORDER — BLOOD SUGAR DIAGNOSTIC
STRIP MISCELLANEOUS
COMMUNITY
Start: 2024-06-28 | End: 2024-07-24 | Stop reason: SDUPTHER

## 2024-07-24 RX ORDER — BLOOD-GLUCOSE METER
KIT MISCELLANEOUS
COMMUNITY
Start: 2024-05-30

## 2024-07-24 NOTE — ASSESSMENT & PLAN NOTE
-Pre-pregnancy weight 224 lbs. BMI 37.28.  -Current weight 231 lbs. BMI 38.47.  -TWG 7 lbs.   -Recommended weight gain 11 to 20 lbs.  -Start GDM meal plan and follow up with dietitian as needed.

## 2024-07-24 NOTE — PROGRESS NOTES
Virtual Regular Visit  Name: Anila Cheng      : 1995      MRN: [id number]  Encounter Provider: STEPHANIE Mccain  Encounter Date: 2024   Encounter department: [facility]    Verification of patient location:    Patient is located at Home in the following state in which I hold an active license [place]    Assessment & Plan   1. Diet controlled gestational diabetes mellitus (GDM) in second trimester  Assessment & Plan:  -Check A1c. Last CMP within normal.   -A1c goal is 5.6% or less.  -Follow up with dietitian with dietitian as needed.   -Self monitoring blood glucose (SMBG) fasting; 1 hour after start of each meal and with hypoglycemia.   -Glucose goals: fasting 60-95 mg/dL, 140 mg/dL or less 1 hour post meals, and 120 mg/dL or less 2 hours post meal.   -Report glucose readings weekly via [employer].  -Start GDM calorie meal plan with 3 meals and 3 snacks including recommended combination of carb, protein and fat per meal/snack.  -Please eat meal or snack every 2-3.5 hours while awake.  -No more than 8 to 10 hours of fasting overnight.  -Refer to [employer] MyPlate online as a reference.  -2nd/3rd trimester minimum total daily carbohydrates 175 grams paired with half grams in protein.   -Stay active if no restriction from your OB, walk up to 30 minutes a day.  -Always have glucose available to treat hypoglycemia. Use 15:15 rule.   -Refer to hypoglycemia patient education sheet. SMBG when experiencing signs and symptoms of hypoglycemia and prior to driving.   -Serial fetal growth ultrasounds.  -20 weeks detailed fetal growth ultrasound completed.  -22-24 weeks fetal echo as scheduled.  -If diabetes related medications are started, at 32 weeks gestation; NST twice a week and NINA weekly.   -Continue prenatal vitamin and baby aspirin as recommended.  -At 36 weeks gestation, stop baby aspirin.   -Continue follow-up with your OB and MFM as recommended.  -Stay in close contact  with diabetes education team.  -Follow up as scheduled in 2024.   -Insulin requirements during pregnancy; basal/bolus concept and Metformin discussed.  -Very important to maintain tight glucose control during pregnancy to decrease risk factors including fetal macrosomia; birth injury; risk of ; polyhydramnios; pre-term labor; pre-eclampsia;  hypoglycemia; jaundice and stillbirth.   -Diabetes and pregnancy booklet; meal plan and hypoglycemia patient education.    -4 to 12 weeks postpartum complete 2-hour glucose tolerance test for diabetes screening.   -Postpartum continue with a healthy lifestyle to decrease risk of developing type 2 diabetes.   Lab Results   Component Value Date    HGBA1C 5.7 2017     Orders:  -     Ambulatory Referral to Maternal Fetal Medicine  -     Lancets (OneTouch Delica Plus [id number]) MISC; Use 4 a day. GDM.  -     OneTouch Verio test strip; Test 4 times a day. GDM.  -     Hemoglobin A1C  2. 22 weeks gestation of pregnancy  -     Lancets (OneTouch Delica Plus [id number]) MISC; Use 4 a day. GDM.  -     OneTouch Verio test strip; Test 4 times a day. GDM.  -     Hemoglobin A1C  3. Hypothyroid in pregnancy, antepartum, second trimester  Assessment & Plan:  -2024 TSH 4.41, free T4 0.55.  -Levothyroxine increased to 112 mcg daily.   -Pending repeat TSH/free T4 4 weeks after increase.  -Managed by OBGYN.   Orders:  -     Lancets (OneTouch Delica Plus [id number]) MISC; Use 4 a day. GDM.  -     OneTouch Verio test strip; Test 4 times a day. GDM.  -     Hemoglobin A1C  4. Obesity affecting pregnancy in second trimester, unspecified obesity type  Assessment & Plan:  -Pre-pregnancy weight 224 lbs. BMI 37.28.  -Current weight 231 lbs. BMI 38.47.  -TWG 7 lbs.   -Recommended weight gain 11 to 20 lbs.  -Start GDM meal plan and follow up with dietitian as needed.   Orders:  -     Lancets (OneTouch Delica Plus [id number]) MISC; Use 4 a day. GDM.  -     OneTouch Verio test strip;  Test 4 times a day. GDM.  -     Hemoglobin A1C  5. BMI 38.0-38.9,adult        Encounter provider STEPHANIE Mccain    The patient was identified by name and date of birth. Anila Cheng was informed that this is a telemedicine visit and that the visit is being conducted through the Epic Embedded platform. She agrees to proceed..  My office door was closed. No one else was in the room.  She acknowledged consent and understanding of privacy and security of the video platform. The patient has agreed to participate and understands they can discontinue the visit at any time.    Patient is aware this is a billable service.     History of Present Illness     Anila Cheng is a 28 y.o. female  22 1/7 weeks gestation GDM. 1 hour PG abnormal and History of diet controlled with last pregnancy. Normal baby weights. History of postpartum hypertension after first delivery. SMBG fasting and 1 hour post meals and reporting via flowsheet. FBS normal and 1 hour post meal readings within normal.   Wakes up 6:30 to 6:40 AM; breakfast 7 AM- eggs with veggies and [employer] chocolate milk. 9 AM snack carb smart cookie or popcorn chips with cheese or protein drink or yogurt. 11 AM to 12 PM lunch- chicken cordon katelynn 14 g carbs and 22 g protein with corn on the cob or potato. 1 to 2 PM snack. 5 PM dinner- crab cakes 14 g carbs and 22 g protein with another carb source, picky with veggies. Some snacks will be fruits with cheese or yogurt. 7 to 8 PM bedtime snack cookie with a protein drink. Bedtime 8 to 8:30 PM. Encouraged to increase total daily carbohydrates.   Physically active with children.  Hypothyroidism- last TSH not at goal, Levothyroxine increased to 112 mcg daily and takes correctly. Followed by OBGYN.   Weight stable.   History of anxiety and depression- on Lexapro.     Review of Systems   Constitutional:  Negative for fatigue and fever.   HENT:  Negative for congestion and trouble swallowing.    Eyes:  Negative for  visual disturbance.   Respiratory:  Negative for cough and shortness of breath.    Cardiovascular:  Negative for chest pain, palpitations and leg swelling.   Gastrointestinal:  Positive for nausea. Negative for constipation and vomiting.   Endocrine: Negative for polydipsia, polyphagia and polyuria.   Genitourinary:  Negative for difficulty urinating and vaginal bleeding.   Musculoskeletal:  Negative for back pain.   Skin:  Negative for rash.   Neurological:  Negative for headaches.   Psychiatric/Behavioral:  Negative for sleep disturbance.        Objective     LMP 01/31/2024 (Approximate)   Physical Exam  Constitutional:       Appearance: She is obese.   HENT:      Head: Normocephalic.      Nose: Nose normal.   Eyes:      Conjunctiva/sclera: Conjunctivae normal.   Pulmonary:      Effort: Pulmonary effort is normal.   Neurological:      Mental Status: She is alert and oriented to person, place, and time.   Psychiatric:         Mood and Affect: Mood normal.         Behavior: Behavior normal.         Thought Content: Thought content normal.         Judgment: Judgment normal.         Visit Time  Total Visit Duration: 48 minutes with patient, 10 minutes charting.

## 2024-07-24 NOTE — ASSESSMENT & PLAN NOTE
-Check A1c. Last CMP within normal.   -A1c goal is 5.6% or less.  -Follow up with dietitian with dietitian as needed.   -Self monitoring blood glucose (SMBG) fasting; 1 hour after start of each meal and with hypoglycemia.   -Glucose goals: fasting 60-95 mg/dL, 140 mg/dL or less 1 hour post meals, and 120 mg/dL or less 2 hours post meal.   -Report glucose readings weekly via [employer].  -Start GDM calorie meal plan with 3 meals and 3 snacks including recommended combination of carb, protein and fat per meal/snack.  -Please eat meal or snack every 2-3.5 hours while awake.  -No more than 8 to 10 hours of fasting overnight.  -Refer to [employer] MyPlate online as a reference.  -2nd/3rd trimester minimum total daily carbohydrates 175 grams paired with half grams in protein.   -Stay active if no restriction from your OB, walk up to 30 minutes a day.  -Always have glucose available to treat hypoglycemia. Use 15:15 rule.   -Refer to hypoglycemia patient education sheet. SMBG when experiencing signs and symptoms of hypoglycemia and prior to driving.   -Serial fetal growth ultrasounds.  -20 weeks detailed fetal growth ultrasound completed.  -22-24 weeks fetal echo as scheduled.  -If diabetes related medications are started, at 32 weeks gestation; NST twice a week and NINA weekly.   -Continue prenatal vitamin and baby aspirin as recommended.  -At 36 weeks gestation, stop baby aspirin.   -Continue follow-up with your OB and MFM as recommended.  -Stay in close contact with diabetes education team.  -Follow up as scheduled in 2024.   -Insulin requirements during pregnancy; basal/bolus concept and Metformin discussed.  -Very important to maintain tight glucose control during pregnancy to decrease risk factors including fetal macrosomia; birth injury; risk of ; polyhydramnios; pre-term labor; pre-eclampsia;  hypoglycemia; jaundice and stillbirth.   -Diabetes and pregnancy booklet; meal plan and hypoglycemia  patient education.    -4 to 12 weeks postpartum complete 2-hour glucose tolerance test for diabetes screening.   -Postpartum continue with a healthy lifestyle to decrease risk of developing type 2 diabetes.   Lab Results   Component Value Date    HGBA1C 5.7 08/28/2017      DISPLAY PLAN FREE TEXT

## 2024-07-24 NOTE — ASSESSMENT & PLAN NOTE
-7/2/2024 TSH 4.41, free T4 0.55.  -Levothyroxine increased to 112 mcg daily.   -Pending repeat TSH/free T4 4 weeks after increase.  -Managed by OBGYN.

## 2024-07-25 PROBLEM — Z87.59 HISTORY OF GESTATIONAL HYPERTENSION: Status: ACTIVE | Noted: 2024-07-25

## 2024-07-26 ENCOUNTER — ROUTINE PRENATAL (OUTPATIENT)
Dept: OBGYN CLINIC | Facility: CLINIC | Age: 29
End: 2024-07-26

## 2024-07-26 VITALS — WEIGHT: 228.6 LBS | SYSTOLIC BLOOD PRESSURE: 102 MMHG | DIASTOLIC BLOOD PRESSURE: 60 MMHG | BODY MASS INDEX: 38.04 KG/M2

## 2024-07-26 DIAGNOSIS — E03.9 HYPOTHYROID IN PREGNANCY, ANTEPARTUM, SECOND TRIMESTER: Primary | ICD-10-CM

## 2024-07-26 DIAGNOSIS — O24.410 DIET CONTROLLED GESTATIONAL DIABETES MELLITUS (GDM) IN SECOND TRIMESTER: ICD-10-CM

## 2024-07-26 DIAGNOSIS — O99.282 HYPOTHYROID IN PREGNANCY, ANTEPARTUM, SECOND TRIMESTER: Primary | ICD-10-CM

## 2024-07-26 DIAGNOSIS — O99.212 OBESITY AFFECTING PREGNANCY IN SECOND TRIMESTER, UNSPECIFIED OBESITY TYPE: ICD-10-CM

## 2024-07-26 DIAGNOSIS — Z34.92 SECOND TRIMESTER PREGNANCY: ICD-10-CM

## 2024-07-26 DIAGNOSIS — Z3A.22 22 WEEKS GESTATION OF PREGNANCY: ICD-10-CM

## 2024-07-26 DIAGNOSIS — Z87.59 HISTORY OF GESTATIONAL HYPERTENSION: ICD-10-CM

## 2024-07-26 PROCEDURE — PNV: Performed by: ADVANCED PRACTICE MIDWIFE

## 2024-07-26 NOTE — PROGRESS NOTES
Routine Prenatal Visit  [facility] [address] [place]    Assessment/Plan:  Anila is a 28 y.o. year old  at 22w3d who presents for routine prenatal visit.     1. Hypothyroid in pregnancy, antepartum, second trimester  2. Diet controlled gestational diabetes mellitus (GDM) in second trimester  3. History of gestational hypertension  4. Obesity affecting pregnancy in second trimester, unspecified obesity type  5. Second trimester pregnancy  6. 22 weeks gestation of pregnancy  Assessment & Plan:  - reviewed 2nd trimester precautions  - 162 mg ASA daily until 36 weeks  -Taking levothyroxine- recent increase in dose, Lexapro, PNV, ASA  - fetal echo- 8/15/24  - growth scan 2024  - GDM classes/visit completed to start monitoring sugars, will report to diabetes in pregnancy  -  next visit 4 weeks        Subjective:     CC: Prenatal care    Anila Cheng is a 28 y.o.  female who presents for routine prenatal care at 22w3d.  Pregnancy ROS: No leakage of fluid, pelvic pain, or vaginal bleeding.  Good fetal movement.    The following portions of the patient's history were reviewed and updated as appropriate: allergies, current medications, past family history, past medical history, obstetric history, gynecologic history, past social history, past surgical history and problem list.      Objective:  /60   Wt 104 kg (228 lb 9.6 oz)   LMP 2024 (Approximate)   BMI 38.04 kg/m²   Pregravid Weight/BMI: 102 kg (224 lb) (BMI 37.28)  Current Weight: 104 kg (228 lb 9.6 oz)   Total Weight Gain: 2.087 kg (4 lb 9.6 oz)   Pre-Herbert Vitals    Flowsheet Row Most Recent Value   Prenatal Assessment    Fetal Heart Rate 150   Fundal Height (cm) 22 cm   Movement Present   Prenatal Vitals    Blood Pressure 102/60   Weight - Scale 104 kg (228 lb 9.6 oz)   Urine Albumin/Glucose    Dilation/Effacement/Station    Vaginal Drainage    Edema    LLE Edema None   RLE Edema None            General: Well appearing, no distress  Respiratory: Unlabored breathing  Cardiovascular: Regular rate.  Abdomen: Soft, gravid, nontender  Fundal Height: Appropriate for gestational age.  Extremities: Warm and well perfused.  Non tender.

## 2024-08-07 ENCOUNTER — APPOINTMENT (OUTPATIENT)
Dept: LAB | Facility: CLINIC | Age: 29
End: 2024-08-07
Payer: COMMERCIAL

## 2024-08-07 DIAGNOSIS — E03.9 ACQUIRED HYPOTHYROIDISM: ICD-10-CM

## 2024-08-07 LAB
EST. AVERAGE GLUCOSE BLD GHB EST-MCNC: 100 MG/DL
HBA1C MFR BLD: 5.1 %
T4 FREE SERPL-MCNC: 0.66 NG/DL (ref 0.61–1.12)
TSH SERPL DL<=0.05 MIU/L-ACNC: 1.76 UIU/ML (ref 0.45–4.5)

## 2024-08-07 PROCEDURE — 84443 ASSAY THYROID STIM HORMONE: CPT

## 2024-08-07 PROCEDURE — 36415 COLL VENOUS BLD VENIPUNCTURE: CPT

## 2024-08-07 PROCEDURE — 83036 HEMOGLOBIN GLYCOSYLATED A1C: CPT | Performed by: NURSE PRACTITIONER

## 2024-08-07 PROCEDURE — 84439 ASSAY OF FREE THYROXINE: CPT

## 2024-08-12 NOTE — PATIENT INSTRUCTIONS
Thank you for choosing us for your  care today.  If you have any questions about your ultrasound or care, please do not hesitate to contact us or your primary obstetrician.        Some general instructions for your pregnancy are:    Exercise: Aim for 150 minutes per week of regular exercise.  Walking is great!  Nutrition: Choose healthy sources of calcium, iron, and protein.  Avoid ultraprocessed foods and added sugar.  Learn about Preeclampsia: preeclampsia is a common, potentially serious high blood pressure complication in pregnancy.  A blood pressure of 140mmHg (systolic or top number) or 90mmHg (diastolic or bottom number) should be evaluated by your doctor.  Aspirin is sometimes prescribed in early pregnancy to prevent preeclampsia in women with risk factors - ask your obstetrician if you should be on this medication.  For more resources, visit:  https://www.highriskpregnancyinfo.org/preeclampsia  If you smoke, please try to quit completely but also try to reduce your smoking by as much as possible (as soon as possible).  Do not vape.  Please also avoid cannabis products.  Other warning signs to watch out for in pregnancy or postpartum: chest pain, obstructed breathing or shortness of breath, seizures, thoughts of hurting yourself or your baby, bleeding, a painful or swollen leg, fever, or headache (see AWHONN POST-BIRTH Warning Signs campaign).  If these happen call 911.  Itching is also not normal in pregnancy and if you experience this, especially over your hands and feet, potentially worse at night, notify your doctors.       
Oriented - self; Oriented - place; Oriented - time

## 2024-08-14 PROBLEM — Z3A.25 25 WEEKS GESTATION OF PREGNANCY: Status: ACTIVE | Noted: 2024-07-23

## 2024-08-15 ENCOUNTER — ROUTINE PRENATAL (OUTPATIENT)
Dept: PERINATAL CARE | Facility: OTHER | Age: 29
End: 2024-08-15
Payer: COMMERCIAL

## 2024-08-15 VITALS
BODY MASS INDEX: 38.99 KG/M2 | DIASTOLIC BLOOD PRESSURE: 78 MMHG | SYSTOLIC BLOOD PRESSURE: 135 MMHG | HEIGHT: 64 IN | HEART RATE: 80 BPM | WEIGHT: 228.4 LBS

## 2024-08-15 DIAGNOSIS — Z3A.25 25 WEEKS GESTATION OF PREGNANCY: Primary | ICD-10-CM

## 2024-08-15 DIAGNOSIS — Z36.2 ENCOUNTER FOR FOLLOW-UP ULTRASOUND OF FETAL ANATOMY: ICD-10-CM

## 2024-08-15 DIAGNOSIS — O24.410 DIET CONTROLLED GESTATIONAL DIABETES MELLITUS (GDM) IN SECOND TRIMESTER: ICD-10-CM

## 2024-08-15 PROCEDURE — 76825 ECHO EXAM OF FETAL HEART: CPT | Performed by: OBSTETRICS & GYNECOLOGY

## 2024-08-15 PROCEDURE — 76827 ECHO EXAM OF FETAL HEART: CPT | Performed by: OBSTETRICS & GYNECOLOGY

## 2024-08-15 PROCEDURE — 93325 DOPPLER ECHO COLOR FLOW MAPG: CPT | Performed by: OBSTETRICS & GYNECOLOGY

## 2024-08-15 PROCEDURE — 76815 OB US LIMITED FETUS(S): CPT | Performed by: OBSTETRICS & GYNECOLOGY

## 2024-08-15 PROCEDURE — 99213 OFFICE O/P EST LOW 20 MIN: CPT | Performed by: OBSTETRICS & GYNECOLOGY

## 2024-08-15 NOTE — LETTER
"  Date: 8/15/2024    Angelina Ta MD  [address] [place] [address]    Patient: Anila Cheng   YOB: 1995   Date of Visit: 8/15/2024   Gestational age 25w2d   Nature of this communication: Routine       This patient was seen recently in our  office.  Please see ultrasound report under \"OB Procedures\" tab.  Please don't hesitate to contact our office with any concerns or questions.      Sincerely,      Modesta Henriquez MD  Attending Physician, Maternal-Fetal Medicine  [facility]       "

## 2024-08-15 NOTE — PROGRESS NOTES
"[facility]: Anila Cheng was seen today at 25w2d for fetal echocardiogram.  See ultrasound report under \"OB Procedures\" tab.  Please don't hesitate to contact our office with any concerns or questions.  -Modesta Henriquez MD    "

## 2024-08-20 ENCOUNTER — ROUTINE PRENATAL (OUTPATIENT)
Dept: OBGYN CLINIC | Facility: CLINIC | Age: 29
End: 2024-08-20

## 2024-08-20 VITALS
DIASTOLIC BLOOD PRESSURE: 72 MMHG | BODY MASS INDEX: 38.96 KG/M2 | SYSTOLIC BLOOD PRESSURE: 134 MMHG | HEIGHT: 64 IN | WEIGHT: 228.2 LBS

## 2024-08-20 DIAGNOSIS — O10.919 CHRONIC HYPERTENSION AFFECTING PREGNANCY: Primary | ICD-10-CM

## 2024-08-20 DIAGNOSIS — Z3A.26 26 WEEKS GESTATION OF PREGNANCY: ICD-10-CM

## 2024-08-20 DIAGNOSIS — O24.410 DIET CONTROLLED GESTATIONAL DIABETES MELLITUS (GDM) IN SECOND TRIMESTER: ICD-10-CM

## 2024-08-20 DIAGNOSIS — E03.9 ACQUIRED HYPOTHYROIDISM: ICD-10-CM

## 2024-08-20 DIAGNOSIS — O99.212 OBESITY AFFECTING PREGNANCY IN SECOND TRIMESTER, UNSPECIFIED OBESITY TYPE: ICD-10-CM

## 2024-08-20 PROCEDURE — PNV: Performed by: STUDENT IN AN ORGANIZED HEALTH CARE EDUCATION/TRAINING PROGRAM

## 2024-08-20 RX ORDER — LEVOTHYROXINE SODIUM 112 UG/1
112 TABLET ORAL DAILY
Qty: 30 TABLET | Refills: 1 | Status: SHIPPED | OUTPATIENT
Start: 2024-08-20

## 2024-08-20 NOTE — ASSESSMENT & PLAN NOTE
- Remote history of chronic hypertension on chart review  - /72 today, if becomes chronically elevated above 140/90 would start labetalol

## 2024-08-20 NOTE — PROGRESS NOTES
"Routine Prenatal Visit  [facility] [address], [place], [place]    Assessment/Plan:  Anila is a 28 y.o. year old  at 26w0d who presents for routine prenatal visit.     1. Chronic hypertension affecting pregnancy  Assessment & Plan:  - Remote history of chronic hypertension on chart review  - /72 today, if becomes chronically elevated above 140/90 would start labetalol  Orders:  -     Comprehensive metabolic panel; Future  -     Protein / creatinine ratio, urine; Future  2. Diet controlled gestational diabetes mellitus (GDM) in second trimester  3. Obesity affecting pregnancy in second trimester, unspecified obesity type  4. 26 weeks gestation of pregnancy  -     CBC and differential; Future  -     RPR-Syphilis Screening (Total Syphilis IGG/IGM); Future  -     Anemia Panel w/Reflex, OB; Future        Subjective:     CC: Prenatal care    Anila Cheng is a 28 y.o.  female who presents for routine prenatal care at 26w0d.  Pregnancy ROS: Denies leakage of fluid, pelvic pain, or vaginal bleeding.  Reports normal fetal movement.    The following portions of the patient's history were reviewed and updated as appropriate: allergies, current medications, past family history, past medical history, obstetric history, gynecologic history, past social history, past surgical history and problem list.      Objective:  /72   Ht 5' 4\" (1.626 m)   Wt 104 kg (228 lb 3.2 oz)   LMP 2024 (Approximate)   BMI 39.17 kg/m²   Pregravid Weight/BMI: 102 kg (224 lb) (BMI 38.43)  Current Weight: 104 kg (228 lb 3.2 oz)   Total Weight Gain: 1.905 kg (4 lb 3.2 oz)   Pre-Herbert Vitals      Flowsheet Row Most Recent Value   Prenatal Assessment    Fetal Heart Rate 144   Movement Present   Prenatal Vitals    Blood Pressure 134/72   Weight - Scale 104 kg (228 lb 3.2 oz)   Urine Albumin/Glucose    Dilation/Effacement/Station    Vaginal Drainage    Draining Fluid No   Edema  "   LLE Edema None   RLE Edema None   Facial Edema None             General: Well appearing, no distress  Respiratory: Unlabored breathing  Cardiovascular: Regular rate.  Abdomen: Soft, gravid, nontender  Fundal Height: Appropriate for gestational age.  Extremities: Warm and well perfused.  Non tender.    Angelina Ta MD  [facility]  [facility]  8/20/2024 1:09 PM

## 2024-08-21 ENCOUNTER — APPOINTMENT (OUTPATIENT)
Dept: LAB | Facility: CLINIC | Age: 29
End: 2024-08-21
Payer: COMMERCIAL

## 2024-08-21 DIAGNOSIS — Z3A.26 26 WEEKS GESTATION OF PREGNANCY: ICD-10-CM

## 2024-08-21 DIAGNOSIS — O10.919 CHRONIC HYPERTENSION AFFECTING PREGNANCY: ICD-10-CM

## 2024-08-21 LAB
ALBUMIN SERPL BCG-MCNC: 3.3 G/DL (ref 3.5–5)
ALP SERPL-CCNC: 55 U/L (ref 34–104)
ALT SERPL W P-5'-P-CCNC: 17 U/L (ref 7–52)
ANION GAP SERPL CALCULATED.3IONS-SCNC: 10 MMOL/L (ref 4–13)
AST SERPL W P-5'-P-CCNC: 16 U/L (ref 13–39)
BASOPHILS # BLD AUTO: 0.03 THOUSANDS/ÂΜL (ref 0–0.1)
BASOPHILS NFR BLD AUTO: 0 % (ref 0–1)
BILIRUB SERPL-MCNC: 0.21 MG/DL (ref 0.2–1)
BUN SERPL-MCNC: 13 MG/DL (ref 5–25)
CALCIUM ALBUM COR SERPL-MCNC: 9.1 MG/DL (ref 8.3–10.1)
CALCIUM SERPL-MCNC: 8.5 MG/DL (ref 8.4–10.2)
CHLORIDE SERPL-SCNC: 102 MMOL/L (ref 96–108)
CO2 SERPL-SCNC: 24 MMOL/L (ref 21–32)
CREAT SERPL-MCNC: 0.58 MG/DL (ref 0.6–1.3)
CREAT UR-MCNC: 50.9 MG/DL
EOSINOPHIL # BLD AUTO: 0.04 THOUSAND/ÂΜL (ref 0–0.61)
EOSINOPHIL NFR BLD AUTO: 0 % (ref 0–6)
ERYTHROCYTE [DISTWIDTH] IN BLOOD BY AUTOMATED COUNT: 12.9 % (ref 11.6–15.1)
GFR SERPL CREATININE-BSD FRML MDRD: 125 ML/MIN/1.73SQ M
GLUCOSE SERPL-MCNC: 85 MG/DL (ref 65–140)
HCT VFR BLD AUTO: 35.5 % (ref 34.8–46.1)
HGB BLD-MCNC: 11.8 G/DL (ref 11.5–15.4)
IMM GRANULOCYTES # BLD AUTO: 0.03 THOUSAND/UL (ref 0–0.2)
IMM GRANULOCYTES NFR BLD AUTO: 0 % (ref 0–2)
LYMPHOCYTES # BLD AUTO: 0.71 THOUSANDS/ÂΜL (ref 0.6–4.47)
LYMPHOCYTES NFR BLD AUTO: 8 % (ref 14–44)
MCH RBC QN AUTO: 32.1 PG (ref 26.8–34.3)
MCHC RBC AUTO-ENTMCNC: 33.2 G/DL (ref 31.4–37.4)
MCV RBC AUTO: 97 FL (ref 82–98)
MONOCYTES # BLD AUTO: 0.54 THOUSAND/ÂΜL (ref 0.17–1.22)
MONOCYTES NFR BLD AUTO: 6 % (ref 4–12)
NEUTROPHILS # BLD AUTO: 7.56 THOUSANDS/ÂΜL (ref 1.85–7.62)
NEUTS SEG NFR BLD AUTO: 86 % (ref 43–75)
NRBC BLD AUTO-RTO: 0 /100 WBCS
PLATELET # BLD AUTO: 306 THOUSANDS/UL (ref 149–390)
PMV BLD AUTO: 10.4 FL (ref 8.9–12.7)
POTASSIUM SERPL-SCNC: 4 MMOL/L (ref 3.5–5.3)
PROT SERPL-MCNC: 6.6 G/DL (ref 6.4–8.4)
PROT UR-MCNC: 6.4 MG/DL
PROT/CREAT UR: 0.1 MG/G{CREAT} (ref 0–0.1)
RBC # BLD AUTO: 3.68 MILLION/UL (ref 3.81–5.12)
SODIUM SERPL-SCNC: 136 MMOL/L (ref 135–147)
TREPONEMA PALLIDUM IGG+IGM AB [PRESENCE] IN SERUM OR PLASMA BY IMMUNOASSAY: NORMAL
WBC # BLD AUTO: 8.91 THOUSAND/UL (ref 4.31–10.16)

## 2024-08-21 PROCEDURE — 36415 COLL VENOUS BLD VENIPUNCTURE: CPT

## 2024-08-21 PROCEDURE — 82570 ASSAY OF URINE CREATININE: CPT

## 2024-08-21 PROCEDURE — 84156 ASSAY OF PROTEIN URINE: CPT

## 2024-08-21 PROCEDURE — 80053 COMPREHEN METABOLIC PANEL: CPT

## 2024-08-21 PROCEDURE — 86780 TREPONEMA PALLIDUM: CPT

## 2024-08-21 PROCEDURE — 85025 COMPLETE CBC W/AUTO DIFF WBC: CPT

## 2024-08-29 ENCOUNTER — ULTRASOUND (OUTPATIENT)
Dept: PERINATAL CARE | Facility: OTHER | Age: 29
End: 2024-08-29
Payer: COMMERCIAL

## 2024-08-29 VITALS
HEART RATE: 69 BPM | BODY MASS INDEX: 39.61 KG/M2 | HEIGHT: 64 IN | WEIGHT: 232 LBS | DIASTOLIC BLOOD PRESSURE: 72 MMHG | SYSTOLIC BLOOD PRESSURE: 124 MMHG

## 2024-08-29 DIAGNOSIS — E66.01 SEVERE OBESITY DUE TO EXCESS CALORIES AFFECTING PREGNANCY IN SECOND TRIMESTER (HCC): ICD-10-CM

## 2024-08-29 DIAGNOSIS — O99.212 SEVERE OBESITY DUE TO EXCESS CALORIES AFFECTING PREGNANCY IN SECOND TRIMESTER (HCC): ICD-10-CM

## 2024-08-29 DIAGNOSIS — O24.410 DIET CONTROLLED GESTATIONAL DIABETES MELLITUS (GDM) IN SECOND TRIMESTER: Primary | ICD-10-CM

## 2024-08-29 DIAGNOSIS — O99.282 HYPOTHYROID IN PREGNANCY, ANTEPARTUM, SECOND TRIMESTER: ICD-10-CM

## 2024-08-29 DIAGNOSIS — Z3A.27 27 WEEKS GESTATION OF PREGNANCY: ICD-10-CM

## 2024-08-29 DIAGNOSIS — O10.919 CHRONIC HYPERTENSION AFFECTING PREGNANCY: ICD-10-CM

## 2024-08-29 DIAGNOSIS — Z36.89 ENCOUNTER FOR ULTRASOUND TO ASSESS FETAL GROWTH: ICD-10-CM

## 2024-08-29 DIAGNOSIS — E03.9 HYPOTHYROID IN PREGNANCY, ANTEPARTUM, SECOND TRIMESTER: ICD-10-CM

## 2024-08-29 PROCEDURE — 99214 OFFICE O/P EST MOD 30 MIN: CPT | Performed by: OBSTETRICS & GYNECOLOGY

## 2024-08-29 PROCEDURE — 76816 OB US FOLLOW-UP PER FETUS: CPT | Performed by: OBSTETRICS & GYNECOLOGY

## 2024-08-29 NOTE — PROGRESS NOTES
"[facility]: Anila Cheng was seen today at 27w2d for fetal growth assessment ultrasound.  See ultrasound report under \"OB Procedures\" tab.  Please don't hesitate to contact our office with any concerns or questions.  -Modesta Henriquez MD    "

## 2024-08-29 NOTE — LETTER
"  Date: 2024    STEPHANIE Callahan  [address]  [address]  [facility] [address]    Patient: Anila Cheng   YOB: 1995   Date of Visit: 2024   Gestational age 27w2d   Nature of this communication: Routine       This patient was seen recently in our  office.  Please see ultrasound report under \"OB Procedures\" tab.  Please don't hesitate to contact our office with any concerns or questions.      Sincerely,      Modesta Henriquez MD  Attending Physician, Maternal-Fetal Medicine  [facility]       "

## 2024-08-30 ENCOUNTER — DOCUMENTATION (OUTPATIENT)
Dept: PERINATAL CARE | Facility: CLINIC | Age: 29
End: 2024-08-30

## 2024-08-30 NOTE — PROGRESS NOTES
Date: 08/30/24  Anila Cheng  1995  Estimated Date of Delivery: 11/26/24  27w3d  [facility]N:[facility]    Diagnosis:  Diet controlled GDM; history of diet controlled gestational diabetes in 2021 managed by this program    Blood Sugar Logs Submitted via: Glucose Flowsheet      Assessment and Plan:  No diabetes related medications Has declined to complete Class 2 with a dietitian. Left voicemail message that the diabetes educators/dietitians will begin following her BG reports.   2000 calorie (CHO:45-15-60-15-60-30) (PRO: 2/3-1-3/4-1-3/4-2) meal plan consisting of 3 meals and 3 snacks daily including protein at each. Has changed bedtime snack to 1 CHO serving (15 mg ) & 2-3 oz protein.   Advised patient to continue current meal plan  Avoid fasting for > 10 hours overnight  Continue SMBG 4 x per day (Fasting, 1 hour after start of each meal) with a One-Touch Verio Reflect glucose meter  If okay by physician, recommend up to 30 minutes of physical activity daily     Lab Work:   Lab Results   Component Value Date    HGBA1C 5.1 08/07/2024      Ultrasound:       Date:8/29/24       Fetal Growth: Normal       NINA: Normal  Next: 9/26/24    Diabetes Self Management Support Plan outside of ongoing care: Spouse/Family   Patient Stated Goal:  To be determined at Class 2    Goal Assessment:  To be determined at Class 2    Date to report next: weekly     Alexa Wolfe, MS, RD, [facility]  Diabetes Educator  [facility] Maternal Fetal Medicine  Diabetes in Pregnancy Program  [address], [address]  [place] [address]  [facility]

## 2024-09-05 ENCOUNTER — DOCUMENTATION (OUTPATIENT)
Dept: PERINATAL CARE | Facility: CLINIC | Age: 29
End: 2024-09-05

## 2024-09-05 NOTE — PROGRESS NOTES
Date: 09/05/24  Anila Cheng  1995  Estimated Date of Delivery: 11/26/24  28w2d  OB/GYN:[facility]    Diagnosis:  Diet controlled GDM; history of diet controlled gestational diabetes in 2021 managed by this program    Blood Sugar Logs Submitted via: Glucose Flowsheet      Assessment and Plan:  No diabetes related medications Has declined to complete Class 2 with a dietitian.   2000 calorie (CHO:45-15-60-15-60-30) (PRO: 2/3-1-3/4-1-3/4-2) meal plan consisting of 3 meals and 3 snacks daily including protein at each. Has changed bedtime snack to 1 CHO serving (15 mg ) & 2-3 oz protein.   Advised patient to continue current meal plan  Avoid fasting for > 10 hours overnight  Continue SMBG 4 x per day (Fasting, 1 hour after start of each meal) with a One-Touch Verio Reflect glucose meter  If okay by physician, recommend up to 30 minutes of physical activity daily     Lab Work:   Lab Results   Component Value Date    HGBA1C 5.1 08/07/2024      Ultrasound:       Date:8/29/24       Fetal Growth: Normal       NINA: Normal  Next: 9/26/24    Diabetes Self Management Support Plan outside of ongoing care: Spouse/Family   Patient Stated Goal:  To be determined at Class 2    Goal Assessment:  To be determined at Class 2    Date to report next: weekly     Alexa Wolfe, MS, RD, [facility]  Diabetes Educator  [facility] Maternal Fetal Medicine  Diabetes in Pregnancy Program  [address], [address]  [place] [address]  [facility]

## 2024-09-06 ENCOUNTER — ROUTINE PRENATAL (OUTPATIENT)
Dept: OBGYN CLINIC | Facility: CLINIC | Age: 29
End: 2024-09-06
Payer: COMMERCIAL

## 2024-09-06 ENCOUNTER — TELEPHONE (OUTPATIENT)
Age: 29
End: 2024-09-06

## 2024-09-06 VITALS — BODY MASS INDEX: 40.41 KG/M2 | WEIGHT: 235.4 LBS | DIASTOLIC BLOOD PRESSURE: 70 MMHG | SYSTOLIC BLOOD PRESSURE: 110 MMHG

## 2024-09-06 DIAGNOSIS — Z3A.28 28 WEEKS GESTATION OF PREGNANCY: ICD-10-CM

## 2024-09-06 DIAGNOSIS — Z34.93 THIRD TRIMESTER PREGNANCY: Primary | ICD-10-CM

## 2024-09-06 DIAGNOSIS — O99.283 HYPOTHYROIDISM DURING PREGNANCY, THIRD TRIMESTER: ICD-10-CM

## 2024-09-06 DIAGNOSIS — O99.213 SEVERE OBESITY DUE TO EXCESS CALORIES AFFECTING PREGNANCY IN THIRD TRIMESTER (HCC): ICD-10-CM

## 2024-09-06 DIAGNOSIS — Z30.2 REQUEST FOR STERILIZATION: ICD-10-CM

## 2024-09-06 DIAGNOSIS — Z23 NEED FOR TDAP VACCINATION: ICD-10-CM

## 2024-09-06 DIAGNOSIS — O24.410 DIET CONTROLLED GESTATIONAL DIABETES MELLITUS (GDM) IN THIRD TRIMESTER: ICD-10-CM

## 2024-09-06 DIAGNOSIS — Z87.59 HISTORY OF GESTATIONAL HYPERTENSION: ICD-10-CM

## 2024-09-06 DIAGNOSIS — E03.9 HYPOTHYROIDISM DURING PREGNANCY, THIRD TRIMESTER: ICD-10-CM

## 2024-09-06 DIAGNOSIS — E66.01 SEVERE OBESITY DUE TO EXCESS CALORIES AFFECTING PREGNANCY IN THIRD TRIMESTER (HCC): ICD-10-CM

## 2024-09-06 DIAGNOSIS — Z23 ENCOUNTER FOR IMMUNIZATION: ICD-10-CM

## 2024-09-06 PROCEDURE — 90715 TDAP VACCINE 7 YRS/> IM: CPT | Performed by: OBSTETRICS & GYNECOLOGY

## 2024-09-06 PROCEDURE — PNV: Performed by: OBSTETRICS & GYNECOLOGY

## 2024-09-06 PROCEDURE — 90471 IMMUNIZATION ADMIN: CPT | Performed by: OBSTETRICS & GYNECOLOGY

## 2024-09-06 NOTE — PROGRESS NOTES
Assessment  28 y.o.  at 28w3d presenting for routine prenatal visit.     Plan  Diagnoses and all orders for this visit:    Third trimester pregnancy  28 weeks gestation of pregnancy  - PTL precautions  - [facility] teaching  - Birth plan given, peds list given, birth consent signed  - 28 wk labs reviewed  - Tdap given  - Rhogam not indicated  - Return in 2wk for PN    Diet controlled gestational diabetes mellitus (GDM) in third trimester  Obesity affecting pregnancy in third trimester (HCC)  - Follows with MFM  - Continue dietary modifications, glucose checking, and reporting    Hypothyroidism during pregnancy, third trimester  - Continue levothyroxine  - Stable labs    History of gestational hypertension  - Routine BP and symptom monitoring  - On ASA    Encounter for immunization  Need for Tdap vaccination  -     Tdap Vaccine greater than or equal to 6yo    Request for sterilization  - Desires pp tubal sterilization  - Task sent for scheduling 6wk pp  ____________________________________________________________        Subjective    Anila Cheng is a 28 y.o.  at 28w3d who presents for routine prenatal visit. She is without complaint. She denies contractions, loss of fluid, or vaginal bleeding. She feels regular fetal movements.     Wants to discuss pp sterilization and options pp. Discussed high likelihood of  based on hx 2 prior. Discussed immediate pp tubal may not be able to be accommodated, but can tentatively schedule for 6wk pp. Discussed if requires c/s, can be completed concurrently in most cases. Discussed alternative of immediate pp LARC  - IUDs, nexplanon, depo. She expresses she's completed childbearing and would prefer short interval tubal.     Pregnancy Problems:  Patient Active Problem List   Diagnosis    Hypothyroidism during pregnancy, third trimester    Bradycardia    Elevated brain natriuretic peptide (BNP) level    Diet controlled gestational diabetes mellitus (GDM) in third  trimester    Obesity affecting pregnancy in third trimester    28 weeks gestation of pregnancy    Anxiety state    History of gestational hypertension    Chronic hypertension affecting pregnancy         Objective  /70   Wt 107 kg (235 lb 6.4 oz)   LMP 01/31/2024 (Approximate)   BMI 40.41 kg/m²     FHT: 144 BPM   Uterine Size: 28 cm     Physical Exam:  Physical Exam  Constitutional:       General: She is not in acute distress.     Appearance: Normal appearance. She is well-developed. She is not ill-appearing, toxic-appearing or diaphoretic.   HENT:      Head: Normocephalic and atraumatic.   Eyes:      General: No scleral icterus.        Right eye: No discharge.         Left eye: No discharge.      Conjunctiva/sclera: Conjunctivae normal.   Pulmonary:      Effort: Pulmonary effort is normal. No accessory muscle usage or respiratory distress.   Abdominal:      General: There is distension (gravid).      Tenderness: There is no abdominal tenderness. There is no guarding or rebound.   Skin:     General: Skin is warm and dry.      Coloration: Skin is not jaundiced.      Findings: No bruising, erythema or rash.   Neurological:      Mental Status: She is alert.   Psychiatric:         Mood and Affect: Mood normal.         Behavior: Behavior normal.         Thought Content: Thought content normal.         Judgment: Judgment normal.

## 2024-09-06 NOTE — TELEPHONE ENCOUNTER
Pt thought she left her wallet in the office at [facility] today staff verified it is there and pt will be back in for it.

## 2024-09-07 PROBLEM — Z30.2 REQUEST FOR STERILIZATION: Status: ACTIVE | Noted: 2024-09-07

## 2024-09-12 ENCOUNTER — DOCUMENTATION (OUTPATIENT)
Dept: PERINATAL CARE | Facility: CLINIC | Age: 29
End: 2024-09-12

## 2024-09-12 NOTE — PROGRESS NOTES
Date: 09/12/24  Anila Cheng  1995  Estimated Date of Delivery: 11/26/24  298w2d  OB/GYN:[facility]    Diagnosis:  Diet controlled GDM; history of diet controlled gestational diabetes in 2021 managed by this program    Blood Sugar Logs Submitted via: Glucose Flowsheet      Assessment and Plan:  No diabetes related medications Has declined to complete Class 2 with a dietitian.   2000 calorie (CHO:45-15-60-15-60-30) (PRO: 2/3-1-3/4-1-3/4-2) meal plan consisting of 3 meals and 3 snacks daily including protein at each. Has changed bedtime snack to 1 CHO serving (15 mg ) & 2-3 oz protein.   Advised patient to continue current meal plan  Avoid fasting for > 10 hours overnight  Continue SMBG 4 x per day (Fasting, 1 hour after start of each meal) with a One-Touch Verio Reflect glucose meter  If okay by physician, recommend up to 30 minutes of physical activity daily     Lab Work:   Lab Results   Component Value Date    HGBA1C 5.1 08/07/2024      Ultrasound:       Date:8/29/24       Fetal Growth: Normal       NINA: Normal  Next: 9/26/24    Diabetes Self Management Support Plan outside of ongoing care: Spouse/Family   Patient Stated Goal:  To be determined at Class 2    Goal Assessment:  To be determined at Class 2    Date to report next: weekly     Alexa Wolfe, MS, RD, [facility]  Diabetes Educator  [facility] Maternal Fetal Medicine  Diabetes in Pregnancy Program  [address], [address]  [place] [address]  Ob/GYN Care

## 2024-09-19 ENCOUNTER — TELEPHONE (OUTPATIENT)
Age: 29
End: 2024-09-19

## 2024-09-19 ENCOUNTER — TELEPHONE (OUTPATIENT)
Dept: PERINATAL CARE | Facility: CLINIC | Age: 29
End: 2024-09-19

## 2024-09-19 ENCOUNTER — DOCUMENTATION (OUTPATIENT)
Dept: PERINATAL CARE | Facility: CLINIC | Age: 29
End: 2024-09-19

## 2024-09-19 NOTE — PROGRESS NOTES
Date: 09/19/24  Anila Cheng  1995  Estimated Date of Delivery: 11/26/24  30w2d  OB/GYN:[facility]    Diagnosis:  Diet controlled GDM; history of diet controlled gestational diabetes in 2021 managed by this program    Blood Sugar Logs Submitted via: Glucose Flowsheet          Assessment and Plan:  Schedule an appointment to review her meal plan & discuss medication  Has declined to complete Class 2 with a dietitian.   2000 calorie (CHO:45-15-60-15-60-30) (PRO: 2/3-1-3/4-1-3/4-2) meal plan consisting of 3 meals and 3 snacks daily including protein at each. Has changed bedtime snack to 1 CHO serving (15 mg ) & 2-3 oz protein.   Advised patient to continue current meal plan  Avoid fasting for > 10 hours overnight  Continue SMBG 4 x per day (Fasting, 1 hour after start of each meal) with a One-Touch Verio Reflect glucose meter  If okay by physician, recommend up to 30 minutes of physical activity daily     Lab Work:   Lab Results   Component Value Date    HGBA1C 5.1 08/07/2024      Ultrasound:       Date:8/29/24       Fetal Growth: Normal       NINA: Normal  Next: 9/26/24    Diabetes Self Management Support Plan outside of ongoing care: Spouse/Family   Patient Stated Goal:  To be determined at Class 2    Goal Assessment:  To be determined at Class 2    Date to report next: At medication/ diet review appointment    Alexa Wolfe, MS, RD, [facility]  Diabetes Educator  [facility] Maternal Fetal Medicine  Diabetes in Pregnancy Program  [address], [address]  [place] [address]  Ob[facility]N Care

## 2024-09-19 NOTE — TELEPHONE ENCOUNTER
LVM w/office number for PT to schedule 60 min visit w/a dietician to review diet and discuss medications.

## 2024-09-19 NOTE — TELEPHONE ENCOUNTER
Notes in [facility] state pt needs a consult for nutrition. She saw Judith in the past and did not want/have the second class. Scheduling comes up as two classes which she does not want. Warm transfer to office was unsuccessful. Please advise and call pt.

## 2024-09-20 ENCOUNTER — TREATMENT (OUTPATIENT)
Dept: PERINATAL CARE | Facility: CLINIC | Age: 29
End: 2024-09-20

## 2024-09-20 ENCOUNTER — TELEPHONE (OUTPATIENT)
Dept: PERINATAL CARE | Facility: CLINIC | Age: 29
End: 2024-09-20

## 2024-09-20 ENCOUNTER — PATIENT MESSAGE (OUTPATIENT)
Dept: PERINATAL CARE | Facility: CLINIC | Age: 29
End: 2024-09-20

## 2024-09-20 DIAGNOSIS — O24.414 INSULIN CONTROLLED GESTATIONAL DIABETES MELLITUS (GDM) IN THIRD TRIMESTER: Primary | ICD-10-CM

## 2024-09-20 RX ORDER — PEN NEEDLE, DIABETIC 30 GX3/16"
NEEDLE, DISPOSABLE MISCELLANEOUS
Qty: 100 EACH | Refills: 3 | Status: SHIPPED | OUTPATIENT
Start: 2024-09-20 | End: 2024-11-26

## 2024-09-20 RX ORDER — INSULIN GLARGINE 100 [IU]/ML
INJECTION, SOLUTION SUBCUTANEOUS
Qty: 15 ML | Refills: 0 | Status: SHIPPED | OUTPATIENT
Start: 2024-09-20 | End: 2024-11-26

## 2024-09-20 NOTE — PROGRESS NOTES
Date: 09/20/24  Anila Cheng  1995  Estimated Date of Delivery: 11/26/24  30w3d  OB/GYN:[facility]    Diagnosis:  Insulin controlled GDM; history of diet controlled gestational diabetes in 2021 managed by this program    Blood Sugar Logs Submitted via: Glucose Flowsheet      9/20/24 FBS was 97 today. Spoke with patient via phone today & has tried several different bedtime snacks--tried 1 CHO serving (15 gm) & 2-3 oz protein with her BG still increased. Prefers to begin insulin now to control her FBS.  & not continue playing with the meal plan.     Assessment and Plan:  Lantus--begin 26 units at bedtime (9-10 PM)--discussed with Dr. England.  Reviewed how to inject insulin via phone. 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 Has declined to complete Class 2 with a dietitian.   2000 calorie (CHO:45-15-60-15-60-30) (PRO: 2/3-1-3/4-1-3/4-2) meal plan consisting of 3 meals and 3 snacks daily including protein at each. Has changed bedtime snack to 1 CHO serving (15 mg ) & 2-3 oz protein.   Advised patient to continue current meal plan  Avoid fasting for > 10 hours overnight Advised to test her 3 AM BG for 3 nights when starts isnulin.   Continue SMBG 4 x per day (Fasting, 1 hour after start of each meal) with a One-Touch Verio Reflect glucose meter  If okay by physician, recommend up to 30 minutes of physical activity daily     Lab Work:   Lab Results   Component Value Date    HGBA1C 5.1 08/07/2024      Ultrasound:       Date:8/29/24       Fetal Growth: Normal       NINA: Normal  Next: 9/26/24    Diabetes Self Management Support Plan outside of ongoing care: Spouse/Family   Patient Stated Goal: undetermined   Goal Assessment: undetermined    Date to report next: Tuesday, 9/24/24.     Alexa Wolfe, MS, RD, [facility][note 3]  Diabetes Educator  [facility] Maternal Fetal Medicine  Diabetes in Pregnancy Program  [address], [address]  [place] [address]  Ob/GYN Care

## 2024-09-23 NOTE — PATIENT INSTRUCTIONS
Thank you for choosing us for your  care today.  If you have any questions about your ultrasound or care, please do not hesitate to contact us or your primary obstetrician.        Some general instructions for your pregnancy are:    Exercise: Aim for 150 minutes per week of regular exercise.  Walking is great!  Nutrition: Choose healthy sources of calcium, iron, and protein.  Avoid ultraprocessed foods and added sugar.  Learn about Preeclampsia: preeclampsia is a common, potentially serious high blood pressure complication in pregnancy.  A blood pressure of 140mmHg (systolic or top number) or 90mmHg (diastolic or bottom number) should be evaluated by your doctor.  Aspirin is sometimes prescribed in early pregnancy to prevent preeclampsia in women with risk factors - ask your obstetrician if you should be on this medication.  For more resources, visit:  https://[email or web address].[email or web address]  If you smoke, please try to quit completely but also try to reduce your smoking by as much as possible (as soon as possible).  Do not vape.  Please also avoid cannabis products.  Other warning signs to watch out for in pregnancy or postpartum: chest pain, obstructed breathing or shortness of breath, seizures, thoughts of hurting yourself or your baby, bleeding, a painful or swollen leg, fever, or headache (see [facility] Warning Signs campaign).  If these happen call 911.  Itching is also not normal in pregnancy and if you experience this, especially over your hands and feet, potentially worse at night, notify your doctors.     Kick Counts in Pregnancy   AMBULATORY CARE:   Kick counts  measure how much your baby is moving in your womb. A kick from your baby can be felt as a twist, turn, swish, roll, or jab. It is common to feel your baby kicking at 26 to 28 weeks of pregnancy. You may feel your baby kick as early as 20 weeks of pregnancy. You may want to start counting at 28 weeks.   Contact your  doctor immediately if:   You feel a change in the number of kicks or movements of your baby.      You feel fewer than 10 kicks within 2 hours.      You have questions or concerns about your baby's movements.     Why measure kick counts:  Your baby's movement may provide information about your baby's health. He or she may move less, or not at all, if there are problems. Your baby may move less if he or she is not getting enough oxygen or nutrition from the placenta. Do not smoke while you are pregnant. Smoking decreases the amount of oxygen that gets to your baby. Talk to your healthcare provider if you need help to quit smoking. Tell your healthcare provider as soon as you feel a change in your baby's movements.  When to measure kick counts:   Measure kick counts at the same time every day.       Measure kick counts when your baby is awake and most active. Your baby may be most active in the evening.     How to measure kick counts:  Check that your baby is awake before you measure kick counts. You can wake up your baby by lightly pushing on your belly, walking, or drinking something cold. Your healthcare provider may tell you different ways to measure kick counts. You may be told to do the following:  Use a chart or clock to keep track of the time you start and finish counting.      Sit in a chair or lie on your left side.      Place your hands on the largest part of your belly.      Count until you reach 10 kicks. Write down how much time it takes to count 10 kicks.      It may take 30 minutes to 2 hours to count 10 kicks. It should not take more than 2 hours to count 10 kicks.     Follow up with your doctor as directed:  Write down your questions so you remember to ask them during your visits.   © Copyright [employer] 2023 Information is for End User's use only and may not be sold, redistributed or otherwise used for commercial purposes.  The above information is an  only. It is not intended as  medical advice for individual conditions or treatments. Talk to your doctor, nurse or pharmacist before following any medical regimen to see if it is safe and effective for you.

## 2024-09-26 ENCOUNTER — ULTRASOUND (OUTPATIENT)
Dept: PERINATAL CARE | Facility: OTHER | Age: 29
End: 2024-09-26
Payer: COMMERCIAL

## 2024-09-26 VITALS
HEART RATE: 76 BPM | WEIGHT: 235 LBS | HEIGHT: 64 IN | DIASTOLIC BLOOD PRESSURE: 72 MMHG | SYSTOLIC BLOOD PRESSURE: 120 MMHG | BODY MASS INDEX: 40.12 KG/M2

## 2024-09-26 DIAGNOSIS — Z3A.31 31 WEEKS GESTATION OF PREGNANCY: ICD-10-CM

## 2024-09-26 DIAGNOSIS — O24.410 DIET CONTROLLED GESTATIONAL DIABETES MELLITUS (GDM) IN THIRD TRIMESTER: Primary | ICD-10-CM

## 2024-09-26 PROBLEM — O24.414 INSULIN CONTROLLED GESTATIONAL DIABETES MELLITUS (GDM) IN THIRD TRIMESTER: Status: ACTIVE | Noted: 2024-07-18

## 2024-09-26 PROCEDURE — 76816 OB US FOLLOW-UP PER FETUS: CPT | Performed by: OBSTETRICS & GYNECOLOGY

## 2024-09-26 PROCEDURE — 99213 OFFICE O/P EST LOW 20 MIN: CPT | Performed by: OBSTETRICS & GYNECOLOGY

## 2024-09-26 NOTE — PROGRESS NOTES
The patient was seen today for an ultrasound.  Please see ultrasound report (located under Ob Procedures) for additional details.   Thank you very much for allowing us to participate in the care of this very nice patient.  Should you have any questions, please do not hesitate to contact me.     Romel Bazzi MD FACOG  Attending Physician, Maternal-Fetal Medicine  [facility]

## 2024-09-27 ENCOUNTER — ROUTINE PRENATAL (OUTPATIENT)
Dept: OBGYN CLINIC | Facility: MEDICAL CENTER | Age: 29
End: 2024-09-27

## 2024-09-27 ENCOUNTER — TELEPHONE (OUTPATIENT)
Dept: PERINATAL CARE | Facility: CLINIC | Age: 29
End: 2024-09-27

## 2024-09-27 VITALS — SYSTOLIC BLOOD PRESSURE: 118 MMHG | WEIGHT: 236 LBS | DIASTOLIC BLOOD PRESSURE: 70 MMHG | BODY MASS INDEX: 40.51 KG/M2

## 2024-09-27 DIAGNOSIS — Z3A.31 31 WEEKS GESTATION OF PREGNANCY: ICD-10-CM

## 2024-09-27 DIAGNOSIS — O10.919 CHRONIC HYPERTENSION AFFECTING PREGNANCY: Primary | ICD-10-CM

## 2024-09-27 DIAGNOSIS — E03.9 HYPOTHYROIDISM DURING PREGNANCY, THIRD TRIMESTER: ICD-10-CM

## 2024-09-27 DIAGNOSIS — O24.414 INSULIN CONTROLLED GESTATIONAL DIABETES MELLITUS (GDM) IN THIRD TRIMESTER: Primary | ICD-10-CM

## 2024-09-27 DIAGNOSIS — O24.410 DIET CONTROLLED GESTATIONAL DIABETES MELLITUS (GDM) IN THIRD TRIMESTER: ICD-10-CM

## 2024-09-27 DIAGNOSIS — O99.283 HYPOTHYROIDISM DURING PREGNANCY, THIRD TRIMESTER: ICD-10-CM

## 2024-09-27 PROCEDURE — PNV: Performed by: NURSE PRACTITIONER

## 2024-09-27 NOTE — PROGRESS NOTES
Denies loss of fluid, vaginal bleeding and abdominal pain.  Confirms frequent fetal movement.  Doing fetal kick counts.  Tolerating prenatal vitamin, Lexapro, low-dose aspirin and levothyroxine well.  Has not needed to start insulin.  Fasting blood sugars less than 95 and 1 hour postprandials less than 140.   center ultrasound reviewed-24-Vertex presentation, normal-appearing fetal growth, posterior placenta, NINA-WNL and EFW 1769g/3 pounds 14 ounces (43%).  Recommendation for follow-up in 4 weeks  BP: 118/70 wt : + 12lbs  Plan  -Continue prenatal vitamin daily  -Continue fetal kick counts daily  -Depression/anxiety in pregnancy continue Lexapro as ordered.  Call office with worsening symptoms, thoughts of self-harm or harm to others  -Hypothyroid in pregnancy continue levothyroxine as ordered  -Gestational diabetes encouraged to continue following diet, checking blood sugars and close contact with diabetes and pregnancy program  -Written information provided about RSV vaccine  -Follow-up with  center scheduled 10/25/24  -Common discomforts of pregnancy and precautions including  labor reviewed.  Signs and symptoms to report reviewed.  RTO 2 weeks

## 2024-10-07 PROBLEM — Z3A.33 33 WEEKS GESTATION OF PREGNANCY: Status: ACTIVE | Noted: 2024-07-23

## 2024-10-07 NOTE — ASSESSMENT & PLAN NOTE
- Reports sugars to Diabetes in Pregnancy   - started insulin on Sunday currently on 26 units Lantus  - will contact [facility] regarding ANFS-   Lab Results   Component Value Date    HGBA1C 5.1 08/07/2024

## 2024-10-07 NOTE — ASSESSMENT & PLAN NOTE
- reviewed s/s PTL, FKC  - 162 mg ASA daily until 36 weeks  - meds: levothyroxine, Lexapro, PNV, ASA  - fetal echo- completed 8/15/24  - next rpopos80/25/2024  - Flu vaccine - declined, RSV vaccine - next visit  -  next visit 2 weeks

## 2024-10-08 ENCOUNTER — ROUTINE PRENATAL (OUTPATIENT)
Dept: OBGYN CLINIC | Facility: CLINIC | Age: 29
End: 2024-10-08

## 2024-10-08 VITALS — BODY MASS INDEX: 41.13 KG/M2 | DIASTOLIC BLOOD PRESSURE: 74 MMHG | WEIGHT: 239.6 LBS | SYSTOLIC BLOOD PRESSURE: 122 MMHG

## 2024-10-08 DIAGNOSIS — O10.919 CHRONIC HYPERTENSION AFFECTING PREGNANCY: ICD-10-CM

## 2024-10-08 DIAGNOSIS — E03.9 HYPOTHYROIDISM DURING PREGNANCY, THIRD TRIMESTER: Primary | ICD-10-CM

## 2024-10-08 DIAGNOSIS — Z87.59 HISTORY OF GESTATIONAL HYPERTENSION: ICD-10-CM

## 2024-10-08 DIAGNOSIS — O99.213 OBESITY AFFECTING PREGNANCY IN THIRD TRIMESTER, UNSPECIFIED OBESITY TYPE: ICD-10-CM

## 2024-10-08 DIAGNOSIS — Z34.93 THIRD TRIMESTER PREGNANCY: ICD-10-CM

## 2024-10-08 DIAGNOSIS — O99.283 HYPOTHYROIDISM DURING PREGNANCY, THIRD TRIMESTER: Primary | ICD-10-CM

## 2024-10-08 DIAGNOSIS — O24.414 INSULIN CONTROLLED GESTATIONAL DIABETES MELLITUS (GDM) IN THIRD TRIMESTER: ICD-10-CM

## 2024-10-08 DIAGNOSIS — Z3A.33 33 WEEKS GESTATION OF PREGNANCY: ICD-10-CM

## 2024-10-08 DIAGNOSIS — Z30.2 REQUEST FOR STERILIZATION: ICD-10-CM

## 2024-10-08 PROCEDURE — PNV: Performed by: ADVANCED PRACTICE MIDWIFE

## 2024-10-08 NOTE — PROGRESS NOTES
Routine Prenatal Visit  [facility] [address], [facility]O [place]    Assessment/Plan:  Anila is a 28 y.o. year old  at 33w0d who presents for routine prenatal visit.     1. Hypothyroidism during pregnancy, third trimester  Assessment & Plan:  -2024 TSH 1.76, free T4 0.66.  -Levothyroxine increased to 112 mcg daily.       2. Insulin controlled gestational diabetes mellitus (GDM) in third trimester  Assessment & Plan:  - Reports sugars to Diabetes in Pregnancy   - started insulin on  currently on 26 units Lantus  - will contact [facility] regarding ANFS-   Lab Results   Component Value Date    HGBA1C 5.1 2024     3. Obesity affecting pregnancy in third trimester, unspecified obesity type  4. History of gestational hypertension  5. Request for sterilization  Assessment & Plan:  Ongoing scheduling for 6wk pp; desires with c/s if indicated  -previously counseled  6. Chronic hypertension affecting pregnancy  Assessment & Plan:  Has not had any recurrent elevated pressures above 140/90  7. Third trimester pregnancy  8. 33 weeks gestation of pregnancy  Assessment & Plan:  - reviewed s/s PTL, FKC  - 162 mg ASA daily until 36 weeks  - meds: levothyroxine, Lexapro, PNV, ASA  - fetal echo- completed 8/15/24  - next norabh28/  - Flu vaccine - declined, RSV vaccine - next visit  -  next visit 2 weeks        Subjective:     CC: Prenatal care    Anila Cheng is a 28 y.o.  female who presents for routine prenatal care at 33w0d.  Pregnancy ROS: no leakage of fluid, pelvic pain, or vaginal bleeding.  Good fetal movement.    The following portions of the patient's history were reviewed and updated as appropriate: allergies, current medications, past family history, past medical history, obstetric history, gynecologic history, past social history, past surgical history and problem list.      Objective:  /74   Wt 109 kg (239 lb 9.6 oz)   LMP 2024  (Approximate)   BMI 41.13 kg/m²   Pregravid Weight/BMI: 102 kg (224 lb) (BMI 38.43)  Current Weight: 109 kg (239 lb 9.6 oz)   Total Weight Gain: 7.076 kg (15 lb 9.6 oz)   Pre- Vitals      Flowsheet Row Most Recent Value   Prenatal Assessment    Fetal Heart Rate 154   Fundal Height (cm) 33 cm   Movement Present   Prenatal Vitals    Blood Pressure 122/74   Weight - Scale 109 kg (239 lb 9.6 oz)   Urine Albumin/Glucose    Dilation/Effacement/Station    Vaginal Drainage    Edema    LLE Edema None   RLE Edema None             General: Well appearing, no distress  Respiratory: Unlabored breathing  Cardiovascular: Regular rate.  Abdomen: Soft, gravid, nontender  Fundal Height: Appropriate for gestational age.  Extremities: Warm and well perfused.  Non tender.

## 2024-10-09 NOTE — PATIENT INSTRUCTIONS
Nonstress Test for Pregnancy   WHAT YOU NEED TO KNOW:   What do I need to know about a nonstress test?  A nonstress test measures your baby's heart rate and movements. Nonstress means that no stress will be placed on your baby during the test.  How do I prepare for a nonstress test?  Your healthcare provider will talk to you about how to prepare for this test. He or she may tell you to eat and drink plenty of fluids before your test. If you smoke, you may be asked not to smoke within 2 hours before the test. He or she will also tell you which medicines to take or not take on the day of your test.  What will happen during a nonstress test?  You may be asked to lie down or recline back for the test on a bed. One or 2 belts with sensors will be placed around your abdomen. Your baby's heart rate will be recorded with a machine. If your baby does not move, your baby may be asleep. Your healthcare provider may make a noise near your abdomen to try to wake your baby. The test usually takes about 20 minutes, but can take longer if your baby needs to be awakened.  What do I need to know about the test results?  Your baby will be expected to move at least 2 times for a certain amount of time. Your baby's heart rate will be expected to go up by a certain number of beats per minute during movement. If your baby does not move as expected, the test may need to be repeated or you may need other tests.  CARE AGREEMENT:   You have the right to help plan your care. Learn about your health condition and how it may be treated. Discuss treatment options with your healthcare providers to decide what care you want to receive. You always have the right to refuse treatment. The above information is an  only. It is not intended as medical advice for individual conditions or treatments. Talk to your doctor, nurse or pharmacist before following any medical regimen to see if it is safe and effective for you.  © Copyright [employer]  Information is for End User's use only and may not be sold, redistributed or otherwise used for commercial purposes. All illustrations and images included in CareNotes® are the copyrighted property of [employer] or [employer]    Thank you for choosing us for your  care today.  If you have any questions about your ultrasound or care, please do not hesitate to contact us or your primary obstetrician.        Some general instructions for your pregnancy are:    Exercise: Aim for 150 minutes per week of regular exercise.  Walking is great!  Nutrition: Choose healthy sources of calcium, iron, and protein.  Avoid ultraprocessed foods and added sugar.  Learn about Preeclampsia: preeclampsia is a common, potentially serious high blood pressure complication in pregnancy.  A blood pressure of 140mmHg (systolic or top number) or 90mmHg (diastolic or bottom number) should be evaluated by your doctor.  Aspirin is sometimes prescribed in early pregnancy to prevent preeclampsia in women with risk factors - ask your obstetrician if you should be on this medication.  For more resources, visit:  https://www.highriskpregnancyinfo.org/preeclampsia  If you smoke, please try to quit completely but also try to reduce your smoking by as much as possible (as soon as possible).  Do not vape.  Please also avoid cannabis products.  Other warning signs to watch out for in pregnancy or postpartum: chest pain, obstructed breathing or shortness of breath, seizures, thoughts of hurting yourself or your baby, bleeding, a painful or swollen leg, fever, or headache (see AWHONN POST-BIRTH Warning Signs campaign).  If these happen call 911.  Itching is also not normal in pregnancy and if you experience this, especially over your hands and feet, potentially worse at night, notify your doctors.     Nonstress Test for Pregnancy   WHAT YOU NEED TO KNOW:   What do I need to know about a nonstress test?  A nonstress test  measures your baby's heart rate and movements. Nonstress means that no stress will be placed on your baby during the test.  How do I prepare for a nonstress test?  Your healthcare provider will talk to you about how to prepare for this test. Your provider may tell you to eat and drink plenty of liquids before your test. If you smoke, you may be asked not to smoke within 2 hours before the test. Your provider will also tell you which medicines to take or not take on the day of your test.  What will happen during a nonstress test?  You may be asked to lie down or recline back for the test on a bed. One or 2 belts with sensors will be placed around your abdomen. Your baby's heart rate will be recorded with a machine. If your baby does not move, your baby may be asleep. Your healthcare provider may make a noise near your abdomen to try to wake your baby. The test usually takes about 20 minutes, but can take longer if your baby needs to be awakened.        What do I need to know about the test results?  Your baby will be expected to move at least 2 times for a certain amount of time. Your baby's heart rate will be expected to go up by a certain number of beats per minute during movement. If your baby does not move as expected, the test may need to be repeated or you may need other tests.  CARE AGREEMENT:   You have the right to help plan your care. Learn about your health condition and how it may be treated. Discuss treatment options with your healthcare providers to decide what care you want to receive. You always have the right to refuse treatment. The above information is an  only. It is not intended as medical advice for individual conditions or treatments. Talk to your doctor, nurse or pharmacist before following any medical regimen to see if it is safe and effective for you.  © Copyright [employer] 2023 Information is for End User's use only and may not be sold, redistributed or otherwise used for  commercial purposes.     Kick Counts in Pregnancy   AMBULATORY CARE:   Kick counts  measure how much your baby is moving in your womb. A kick from your baby can be felt as a twist, turn, swish, roll, or jab. It is common to feel your baby kicking at 26 to 28 weeks of pregnancy. You may feel your baby kick as early as 20 weeks of pregnancy. You may want to start counting at 28 weeks.   Contact your doctor immediately if:   You feel a change in the number of kicks or movements of your baby.      You feel fewer than 10 kicks within 2 hours.      You have questions or concerns about your baby's movements.     Why measure kick counts:  Your baby's movement may provide information about your baby's health. He or she may move less, or not at all, if there are problems. Your baby may move less if he or she is not getting enough oxygen or nutrition from the placenta. Do not smoke while you are pregnant. Smoking decreases the amount of oxygen that gets to your baby. Talk to your healthcare provider if you need help to quit smoking. Tell your healthcare provider as soon as you feel a change in your baby's movements.  When to measure kick counts:   Measure kick counts at the same time every day.       Measure kick counts when your baby is awake and most active. Your baby may be most active in the evening.     How to measure kick counts:  Check that your baby is awake before you measure kick counts. You can wake up your baby by lightly pushing on your belly, walking, or drinking something cold. Your healthcare provider may tell you different ways to measure kick counts. You may be told to do the following:  Use a chart or clock to keep track of the time you start and finish counting.      Sit in a chair or lie on your left side.      Place your hands on the largest part of your belly.      Count until you reach 10 kicks. Write down how much time it takes to count 10 kicks.      It may take 30 minutes to 2 hours to count 10  kicks. It should not take more than 2 hours to count 10 kicks.     Follow up with your doctor as directed:  Write down your questions so you remember to ask them during your visits.   © Copyright [employer] 2023 Information is for End User's use only and may not be sold, redistributed or otherwise used for commercial purposes.  The above information is an  only. It is not intended as medical advice for individual conditions or treatments. Talk to your doctor, nurse or pharmacist before following any medical regimen to see if it is safe and effective for you.

## 2024-10-14 ENCOUNTER — ULTRASOUND (OUTPATIENT)
Dept: PERINATAL CARE | Facility: OTHER | Age: 29
End: 2024-10-14
Payer: COMMERCIAL

## 2024-10-14 VITALS
HEART RATE: 83 BPM | HEIGHT: 64 IN | DIASTOLIC BLOOD PRESSURE: 72 MMHG | BODY MASS INDEX: 41.21 KG/M2 | WEIGHT: 241.4 LBS | SYSTOLIC BLOOD PRESSURE: 122 MMHG

## 2024-10-14 DIAGNOSIS — O24.414 INSULIN CONTROLLED GESTATIONAL DIABETES MELLITUS (GDM) IN THIRD TRIMESTER: ICD-10-CM

## 2024-10-14 DIAGNOSIS — Z3A.33 33 WEEKS GESTATION OF PREGNANCY: Primary | ICD-10-CM

## 2024-10-14 DIAGNOSIS — E66.813 OBESITY, CLASS 3: ICD-10-CM

## 2024-10-14 PROCEDURE — 59025 FETAL NON-STRESS TEST: CPT | Performed by: OBSTETRICS & GYNECOLOGY

## 2024-10-14 PROCEDURE — 76815 OB US LIMITED FETUS(S): CPT | Performed by: OBSTETRICS & GYNECOLOGY

## 2024-10-14 NOTE — PROGRESS NOTES
Non-Stress Testing:    Non-Stress test, equipment, procedure, and expected outcomes explained. Reviewed fetal kick counts and when to call OB.Verified patient understanding of fetal kick counts with teach back method. Patient reports feeling daily fetal movements. Patient has no questions or concerns.     Reviewed non-stress test with Dr. Antoine.

## 2024-10-14 NOTE — LETTER
October 14, 2024     Radha Mendes MD  [address] [place] [address]  [address] [place] [address]    Patient: Anila Cheng   YOB: 1995   Date of Visit: 10/14/2024       Dear Dr. Mendes:    Thank you for referring Anila Cheng to me for evaluation. Below are my notes for this consultation.    If you have questions, please do not hesitate to call me. I look forward to following your patient along with you.         Sincerely,        Lonnie Antoine MD        CC: No Recipients    Lonnie Antoine MD  10/14/2024 10:34 AM  Sign when Signing Visit  A fetal ultrasound and NST were completed. See Ob procedures in Epic for an interpretation and recommendations. Do not hesitate to contact us in [facility] if you have questions.    Lonnie Antoine MD, MSCE  Maternal Fetal Medicine

## 2024-10-14 NOTE — LETTER
NST sleeve cover sheet    Patient name: Anila Cheng  : 1995  MRN: [id number]    AUSTYN: Estimated Date of Delivery: 24    Obstetrician: [facility]    Reason(s) for testing: Obesity (pre-grav BMI 38.43)    Testing frequency:    ___  2x/wk    X  1x/wk  ___  Dopplers  ___  BPP?      Last growth scan: 24 (43%), _________, _________    Baby:      Male   /   Female   /   [place]             Baby Name: ___________________            IOL or  C/S: _____________________

## 2024-10-14 NOTE — PROGRESS NOTES
A fetal ultrasound and NST were completed. See Ob procedures in Epic for an interpretation and recommendations. Do not hesitate to contact us in [facility] if you have questions.    Lonnie Antoine MD, MSCE  Maternal Fetal Medicine

## 2024-10-15 ENCOUNTER — HOSPITAL ENCOUNTER (OUTPATIENT)
Facility: HOSPITAL | Age: 29
Discharge: HOME | End: 2024-10-15
Attending: OBSTETRICS & GYNECOLOGY | Admitting: OBSTETRICS & GYNECOLOGY
Payer: COMMERCIAL

## 2024-10-15 ENCOUNTER — NURSE TRIAGE (OUTPATIENT)
Age: 29
End: 2024-10-15

## 2024-10-15 VITALS
HEART RATE: 86 BPM | DIASTOLIC BLOOD PRESSURE: 79 MMHG | OXYGEN SATURATION: 99 % | RESPIRATION RATE: 16 BRPM | SYSTOLIC BLOOD PRESSURE: 137 MMHG

## 2024-10-15 PROBLEM — O16.9 HYPERTENSION AFFECTING PREGNANCY: Status: ACTIVE | Noted: 2024-10-15

## 2024-10-15 PROBLEM — R00.2 PALPITATIONS: Status: ACTIVE | Noted: 2024-10-15

## 2024-10-15 PROBLEM — R51.9 PREGNANCY HEADACHE IN THIRD TRIMESTER: Status: ACTIVE | Noted: 2024-10-15

## 2024-10-15 PROBLEM — O26.893 PREGNANCY HEADACHE IN THIRD TRIMESTER: Status: ACTIVE | Noted: 2024-10-15

## 2024-10-15 PROBLEM — R07.9 CHEST PAIN: Status: ACTIVE | Noted: 2024-10-15

## 2024-10-15 LAB
ALBUMIN SERPL BCG-MCNC: 3.3 G/DL (ref 3.5–5)
ALP SERPL-CCNC: 61 U/L (ref 34–104)
ALT SERPL W P-5'-P-CCNC: 10 U/L (ref 7–52)
ANION GAP SERPL CALCULATED.3IONS-SCNC: 6 MMOL/L (ref 4–13)
AST SERPL W P-5'-P-CCNC: 14 U/L (ref 13–39)
BASOPHILS # BLD AUTO: 0.03 THOUSANDS/ΜL (ref 0–0.1)
BASOPHILS NFR BLD AUTO: 0 % (ref 0–1)
BILIRUB SERPL-MCNC: 0.25 MG/DL (ref 0.2–1)
BNP SERPL-MCNC: 20 PG/ML (ref 0–100)
BUN SERPL-MCNC: 12 MG/DL (ref 5–25)
CALCIUM ALBUM COR SERPL-MCNC: 9.3 MG/DL (ref 8.3–10.1)
CALCIUM SERPL-MCNC: 8.7 MG/DL (ref 8.4–10.2)
CHLORIDE SERPL-SCNC: 105 MMOL/L (ref 96–108)
CO2 SERPL-SCNC: 25 MMOL/L (ref 21–32)
CREAT SERPL-MCNC: 0.58 MG/DL (ref 0.6–1.3)
CREAT UR-MCNC: 18.4 MG/DL
EOSINOPHIL # BLD AUTO: 0.02 THOUSAND/ΜL (ref 0–0.61)
EOSINOPHIL NFR BLD AUTO: 0 % (ref 0–6)
ERYTHROCYTE [DISTWIDTH] IN BLOOD BY AUTOMATED COUNT: 13.4 % (ref 11.6–15.1)
GFR SERPL CREATININE-BSD FRML MDRD: 125 ML/MIN/1.73SQ M
GLUCOSE SERPL-MCNC: 96 MG/DL (ref 65–140)
HCT VFR BLD AUTO: 34.6 % (ref 34.8–46.1)
HGB BLD-MCNC: 11.4 G/DL (ref 11.5–15.4)
IMM GRANULOCYTES # BLD AUTO: 0.05 THOUSAND/UL (ref 0–0.2)
IMM GRANULOCYTES NFR BLD AUTO: 0 % (ref 0–2)
LYMPHOCYTES # BLD AUTO: 0.92 THOUSANDS/ΜL (ref 0.6–4.47)
LYMPHOCYTES NFR BLD AUTO: 7 % (ref 14–44)
MCH RBC QN AUTO: 31 PG (ref 26.8–34.3)
MCHC RBC AUTO-ENTMCNC: 32.9 G/DL (ref 31.4–37.4)
MCV RBC AUTO: 94 FL (ref 82–98)
MONOCYTES # BLD AUTO: 0.9 THOUSAND/ΜL (ref 0.17–1.22)
MONOCYTES NFR BLD AUTO: 6 % (ref 4–12)
NEUTROPHILS # BLD AUTO: 12.16 THOUSANDS/ΜL (ref 1.85–7.62)
NEUTS SEG NFR BLD AUTO: 87 % (ref 43–75)
NRBC BLD AUTO-RTO: 0 /100 WBCS
PLATELET # BLD AUTO: 288 THOUSANDS/UL (ref 149–390)
PMV BLD AUTO: 9.4 FL (ref 8.9–12.7)
POTASSIUM SERPL-SCNC: 3.9 MMOL/L (ref 3.5–5.3)
PROT SERPL-MCNC: 6.7 G/DL (ref 6.4–8.4)
PROT UR-MCNC: <4 MG/DL
RBC # BLD AUTO: 3.68 MILLION/UL (ref 3.81–5.12)
SODIUM SERPL-SCNC: 136 MMOL/L (ref 135–147)
WBC # BLD AUTO: 14.08 THOUSAND/UL (ref 4.31–10.16)

## 2024-10-15 PROCEDURE — 84156 ASSAY OF PROTEIN URINE: CPT

## 2024-10-15 PROCEDURE — 99204 OFFICE O/P NEW MOD 45 MIN: CPT

## 2024-10-15 PROCEDURE — 83880 ASSAY OF NATRIURETIC PEPTIDE: CPT

## 2024-10-15 PROCEDURE — 80053 COMPREHEN METABOLIC PANEL: CPT

## 2024-10-15 PROCEDURE — 82570 ASSAY OF URINE CREATININE: CPT

## 2024-10-15 PROCEDURE — NC001 PR NO CHARGE: Performed by: OBSTETRICS & GYNECOLOGY

## 2024-10-15 PROCEDURE — 59025 FETAL NON-STRESS TEST: CPT

## 2024-10-15 PROCEDURE — 85025 COMPLETE CBC W/AUTO DIFF WBC: CPT

## 2024-10-15 PROCEDURE — 93005 ELECTROCARDIOGRAM TRACING: CPT

## 2024-10-15 RX ORDER — METOCLOPRAMIDE HYDROCHLORIDE 5 MG/ML
10 INJECTION INTRAMUSCULAR; INTRAVENOUS EVERY 8 HOURS SCHEDULED
Status: DISCONTINUED | OUTPATIENT
Start: 2024-10-15 | End: 2024-10-15 | Stop reason: HOSPADM

## 2024-10-15 RX ORDER — SODIUM CHLORIDE 9 MG/ML
100 INJECTION, SOLUTION INTRAVENOUS ONCE
Status: DISCONTINUED | OUTPATIENT
Start: 2024-10-15 | End: 2024-10-15 | Stop reason: HOSPADM

## 2024-10-15 RX ORDER — MAGNESIUM SULFATE HEPTAHYDRATE 40 MG/ML
2 INJECTION, SOLUTION INTRAVENOUS EVERY 24 HOURS
Status: DISCONTINUED | OUTPATIENT
Start: 2024-10-15 | End: 2024-10-15 | Stop reason: HOSPADM

## 2024-10-15 RX ORDER — DIPHENHYDRAMINE HYDROCHLORIDE 50 MG/ML
25 INJECTION INTRAMUSCULAR; INTRAVENOUS EVERY 8 HOURS SCHEDULED
Status: DISCONTINUED | OUTPATIENT
Start: 2024-10-15 | End: 2024-10-15 | Stop reason: HOSPADM

## 2024-10-15 RX ADMIN — METOCLOPRAMIDE 10 MG: 5 INJECTION, SOLUTION INTRAMUSCULAR; INTRAVENOUS at 16:45

## 2024-10-15 RX ADMIN — DIPHENHYDRAMINE HYDROCHLORIDE 25 MG: 50 INJECTION INTRAMUSCULAR; INTRAVENOUS at 16:45

## 2024-10-15 RX ADMIN — MAGNESIUM SULFATE IN WATER 2 G: 40 INJECTION, SOLUTION INTRAVENOUS at 16:45

## 2024-10-15 NOTE — PROGRESS NOTES
L&D Triage Note - OB/GYN  Anila Cheng 28 y.o. female MRN: [id number]  Unit/Bed#:  TRIAGE  Encounter: [id number]      ASSESSMENT:    Anila Cheng is a 28 y.o.  at 34w0d who presents today due to concern for preeclampsia. Patient also reporting a headache, and chest pain and palpitations. CBC/CMP wnl and p:c ratio: too low to calculate. Blood pressures wnl in triage. Headache cocktail of 1L fluid bolus, reglan, benadryl, and magnesium given. EKG normal. Disposition dependent on the night team.     PLAN:    1) Elevated blood pressures, headaches, chest pain and palpitations; r/o preeclampsia   - Prior history of siPreE w/SF in the postpartum period; presented with chest pain and palpitations, which then required ICU admission  - Current chronic hypertension, not on medications  - Blood pressures in triage: wnl  - HR 80-90's  - CBC/CMP wnl; p:c ratio: too low to calculate  - EKG normal  - Given a HA cocktail consisting of 1L NS bolus, reglan, benadryl, and magnesium  - BNP still pending         - Case discussed with Dr. Villafana    SUBJECTIVE:    Anila Cheng 28 y.o.  at 34w0d with an Estimated Date of Delivery: 24 presenting with elevated blood pressures at home. She reports having blood pressures of 166/87 and 150/87 at home. She also reports having headaches rating it a 6/10, describes it as a pounding headache from her bilateral frontal to temples. She also noticed an associated twitchiness from her eye to her nose to lips on the right side. She reports last taking tylenol at 2PM. She also endorses chest pain and palpitations similar to what she had when she was previously admitted to the ICU for superimposed preeclampsia in the postpartum period.    She denies headache, vision changes, SOB, RUQ pain, and increased swelling.    Her past obstetrical history is significant for 2 's, hx of siPreE w/SF in the postpartum period requiring ICU admission.  Her current  pregnancy has been notable for chronic hypertension (not on meds), hypothyroidism (on levo 112mcg qD), Mobitz type I heart block, and A2GDM (on Lantus 26u qHS).     Contractions: denies  Leakage of fluid: denies  Vaginal Bleeding: denies  Fetal movement: present    OBJECTIVE:    Vitals:    10/15/24 1553   BP:    Pulse: 86   Resp:    SpO2: 99%       Results from last 7 days   Lab Units 10/15/24  1606   WBC Thousand/uL 14.08*   HEMOGLOBIN g/dL 11.4*   MCV fL 94   PLATELETS Thousands/uL 288     Results from last 7 days   Lab Units 10/15/24  1606   POTASSIUM mmol/L 3.9   CHLORIDE mmol/L 105   CO2 mmol/L 25   BUN mg/dL 12   CREATININE mg/dL 0.58*   EGFR ml/min/1.73sq m 125     Results from last 7 days   Lab Units 10/15/24  1606   AST U/L 14   ALT U/L 10   ALK PHOS U/L 61   ALBUMIN g/dL 3.3*          ROS:  Constitutional: Negative  Respiratory: Negative  Cardiovascular: positive for chest pain and palpitations    Gastrointestinal: Negative  Neurological: positive for headaches, and twitchiness from right eye    General Physical Exam:  General: Well appearing, no distress  Respiratory: Unlabored breathing  Cardiovascular: Regular rate.  Abdomen: Soft, gravid, nontender  Fundal Height: Appropriate for gestational age.  Extremities: Warm and well perfused.  Non tender.  Neurological: CN I-XII intact. Bilateral upper and lower extremity strength and sensation intact.    Fetal monitoring:  Fetal heart rate: NST reactive. Baseline 135 with moderate variability and presence of 15x15 accelerations  Deaver: irregular contractions spaced out 18 minutes apart      Albert Choi MD  10/15/2024  5:47 PM

## 2024-10-15 NOTE — PROCEDURES
Anila Cheng, a  at 34w0d with an AUSTYN of 2024, by Ultrasound, was seen at [facility] for the following procedure(s): $Procedure Type: NST]    Nonstress Test  Reason for NST: Routine  Variability: Moderate  Decelerations: None  Accelerations: Yes  Acoustic Stimulator: No  Baseline: 135 BPM  Uterine Irritability: No  Contractions: Regular  Contraction Frequency (minutes): 18 min                   Interpretation  Nonstress Test Interpretation: Reactive  Overall Impression: Reassuring

## 2024-10-15 NOTE — TELEPHONE ENCOUNTER
"Pt is 34w0d,  concerned for elevated BP reading, intermittent HA and eye twitching that started today. States her HA is currently 6/10, took tylenol which provided mild relief. She did have stars in her vision this morning, that has subsided. Reports her right eye has been twitching, sensation goes down her nose and into her upper lip. Denies any RUQ pain or swelling in hands/face/feet. BP reading from 20 minutes ago was 137/87. Pt states she is making herself anxious and feels like she is having palpitations. She checked her BP and HR while on the phone, /87 and HR 94. Denies chest pain or SOB.    ESC sent to On Call Provider Dr. Moore x2- no response    Call back to pt to check in. States she is not feeling well, she is almost at the hospital currently. Last she checked her HR was 93, /87. Generalized pounding HA- 7/10 pain and feels like her heart is beating out of her chest. Advised she should be evaluated, continue to L&D. She verbalized understanding and is thankful.     ESC sent to On Call Provider Dr. Moore and Gigi L&D Charge RN       Reason for Disposition   [1] Pregnant 20 or more weeks AND [2] Systolic BP >= 160 OR Diastolic >= 110    Answer Assessment - Initial Assessment Questions  1. BLOOD PRESSURE: \"What is your blood pressure?\" \"Did you take at least two measurements 5 minutes apart?\"      137/87  2. ONSET: \"When did you take your blood pressure?\"      20 minutes ago  3. HOW: \"How did you take your blood pressure?\" (e.g., automatic home BP monitor, visiting nurse)      Home BP monitor  4. HISTORY: \"Do you have a history of high blood pressure?\" \"Were you diagnosed with preeclampsia during this or previous pregnancies?\"      Had elevated BP readings with last pregnancy  5. MEDICINES: \"Are you taking any medicines for blood pressure?\" \"Have you missed any doses recently?\"      denies  6. PREGNANCY: \"How many weeks pregnant are you?\" \"How many babies are you carrying?\" (e.g., " "single baby, twins)      34w0d  7. AUSTYN: \"What date are you expecting to deliver?\"      11/26/24  8. OTHER SYMPTOMS: \"Do you have any other symptoms?\" (e.g., abdomen pain, chest pain, difficulty breathing, hand or face swelling, headache, vision changes)      denies    Protocols used: Pregnancy - High Blood Pressure-Adult-AH    "

## 2024-10-15 NOTE — TELEPHONE ENCOUNTER
Pt had called in and spoke with a PEP in regards to a high BP reading, HA and eye twitching. During warm transfer, pt was not responding and then line disconnected. Attempted call back to pt, left non-detailed voicemail message for patient to return our call. Mychart message also sent.

## 2024-10-16 LAB
ATRIAL RATE: 75 BPM
P AXIS: 46 DEGREES
PR INTERVAL: 178 MS
QRS AXIS: 41 DEGREES
QRSD INTERVAL: 78 MS
QT INTERVAL: 388 MS
QTC INTERVAL: 433 MS
T WAVE AXIS: 23 DEGREES
VENTRICULAR RATE: 75 BPM

## 2024-10-16 PROCEDURE — 93010 ELECTROCARDIOGRAM REPORT: CPT | Performed by: INTERNAL MEDICINE

## 2024-10-21 ENCOUNTER — TELEPHONE (OUTPATIENT)
Age: 29
End: 2024-10-21

## 2024-10-21 NOTE — TELEPHONE ENCOUNTER
LVM w/office number for PT to r/s 10/25 growth/NST to next available appt that she is agreeable to.

## 2024-10-21 NOTE — TELEPHONE ENCOUNTER
Pt called in to reschedule 10/25 growth, pt can only do mornings and had to cancel bec her kids have a school parade. Offered 11/01 in [place], pt declined  since she can only do mornings. Pls follow up

## 2024-10-22 ENCOUNTER — TELEPHONE (OUTPATIENT)
Dept: OBGYN CLINIC | Facility: MEDICAL CENTER | Age: 29
End: 2024-10-22

## 2024-10-22 ENCOUNTER — ROUTINE PRENATAL (OUTPATIENT)
Dept: PERINATAL CARE | Facility: OTHER | Age: 29
End: 2024-10-22
Payer: COMMERCIAL

## 2024-10-22 VITALS
HEIGHT: 64 IN | BODY MASS INDEX: 41.01 KG/M2 | WEIGHT: 240.2 LBS | DIASTOLIC BLOOD PRESSURE: 60 MMHG | HEART RATE: 75 BPM | SYSTOLIC BLOOD PRESSURE: 100 MMHG

## 2024-10-22 DIAGNOSIS — Z3A.35 35 WEEKS GESTATION OF PREGNANCY: ICD-10-CM

## 2024-10-22 DIAGNOSIS — O24.414 INSULIN CONTROLLED GESTATIONAL DIABETES MELLITUS (GDM) IN THIRD TRIMESTER: Primary | ICD-10-CM

## 2024-10-22 PROCEDURE — 59025 FETAL NON-STRESS TEST: CPT | Performed by: OBSTETRICS & GYNECOLOGY

## 2024-10-22 NOTE — TELEPHONE ENCOUNTER
3RD TRIMESTER CHECK-IN CALL      Overall how are you feeling? Patient reports to be doing well overall. Was recently evaluated at the hospital 10/15 due to concern for preeclampsia. Blood pressures in triage wnl and labs completed. Patient discharged home with return precautions. Patient has f/u appointment this Thursday.     Compliant with routine OB appointments? Yes.     Have you completed your 3rd trimester lab work? Yes. A2GDM- Working with diabetic educators at [facility]. Blood sugars well controlled.      Have you reviewed the contents of 3rd trimester folder from office? Yes.                Have you decided on a pediatrician? Yes. [facility].                            Questions on paperwork to go back to office? No.  Questions on the baby birth certificate forms? No.

## 2024-10-22 NOTE — PROGRESS NOTES
[facility]: Anila Cheng was seen today at 35w0d for NST. Baseline is 135bpm with moderate variability, 15x15 accels, no decels. Please don't hesitate to contact our office with any concerns or questions.  -Modesta Henriquez MD

## 2024-10-22 NOTE — LETTER
"     Date: 10/22/2024    Radha Mendes MD  [address] [place] [address]  [address] [place] [address]    Patient: Anila Cheng   YOB: 1995   Date of Visit: 10/22/2024   Gestational age 35w0d   Nature of this communication: Routine       This patient was seen recently in our  office.  Please see ultrasound report under \"OB Procedures\" tab.  Please don't hesitate to contact our office with any concerns or questions.      Sincerely,      Modesta Henriquez MD  Attending Physician, Maternal-Fetal Medicine  [facility]    "

## 2024-10-24 ENCOUNTER — ROUTINE PRENATAL (OUTPATIENT)
Dept: OBGYN CLINIC | Facility: CLINIC | Age: 29
End: 2024-10-24
Payer: COMMERCIAL

## 2024-10-24 ENCOUNTER — TELEPHONE (OUTPATIENT)
Dept: OBGYN CLINIC | Facility: MEDICAL CENTER | Age: 29
End: 2024-10-24

## 2024-10-24 VITALS — BODY MASS INDEX: 41.37 KG/M2 | WEIGHT: 241 LBS | DIASTOLIC BLOOD PRESSURE: 70 MMHG | SYSTOLIC BLOOD PRESSURE: 120 MMHG

## 2024-10-24 DIAGNOSIS — Z3A.35 35 WEEKS GESTATION OF PREGNANCY: ICD-10-CM

## 2024-10-24 DIAGNOSIS — Z34.83 ENCOUNTER FOR SUPERVISION OF OTHER NORMAL PREGNANCY IN THIRD TRIMESTER: Primary | ICD-10-CM

## 2024-10-24 DIAGNOSIS — O24.414 INSULIN CONTROLLED GESTATIONAL DIABETES MELLITUS (GDM) IN THIRD TRIMESTER: ICD-10-CM

## 2024-10-24 DIAGNOSIS — E03.9 ACQUIRED HYPOTHYROIDISM: ICD-10-CM

## 2024-10-24 DIAGNOSIS — Z29.11 NEED FOR RSV IMMUNIZATION: ICD-10-CM

## 2024-10-24 PROCEDURE — 90471 IMMUNIZATION ADMIN: CPT | Performed by: OBSTETRICS & GYNECOLOGY

## 2024-10-24 PROCEDURE — 90678 RSV VACC PREF BIVALENT IM: CPT | Performed by: OBSTETRICS & GYNECOLOGY

## 2024-10-24 PROCEDURE — PNV: Performed by: OBSTETRICS & GYNECOLOGY

## 2024-10-24 PROCEDURE — 87150 DNA/RNA AMPLIFIED PROBE: CPT | Performed by: OBSTETRICS & GYNECOLOGY

## 2024-10-24 RX ORDER — LEVOTHYROXINE SODIUM 112 UG/1
112 TABLET ORAL DAILY
Qty: 30 TABLET | Refills: 5 | Status: SHIPPED | OUTPATIENT
Start: 2024-10-24

## 2024-10-24 NOTE — PATIENT INSTRUCTIONS
Kick Counts in Pregnancy   AMBULATORY CARE:   Kick counts  measure how much your baby is moving in your womb. A kick from your baby can be felt as a twist, turn, swish, roll, or jab. It is common to feel your baby kicking at 26 to 28 weeks of pregnancy. You may feel your baby kick as early as 20 weeks of pregnancy. You may want to start counting at 28 weeks.   Contact your doctor immediately if:   You feel a change in the number of kicks or movements of your baby.      You feel fewer than 10 kicks within 2 hours.      You have questions or concerns about your baby's movements.     Why measure kick counts:  Your baby's movement may provide information about your baby's health. He or she may move less, or not at all, if there are problems. Your baby may move less if he or she is not getting enough oxygen or nutrition from the placenta. Do not smoke while you are pregnant. Smoking decreases the amount of oxygen that gets to your baby. Talk to your healthcare provider if you need help to quit smoking. Tell your healthcare provider as soon as you feel a change in your baby's movements.  When to measure kick counts:   Measure kick counts at the same time every day.       Measure kick counts when your baby is awake and most active. Your baby may be most active in the evening.     How to measure kick counts:  Check that your baby is awake before you measure kick counts. You can wake up your baby by lightly pushing on your belly, walking, or drinking something cold. Your healthcare provider may tell you different ways to measure kick counts. You may be told to do the following:  Use a chart or clock to keep track of the time you start and finish counting.      Sit in a chair or lie on your left side.      Place your hands on the largest part of your belly.      Count until you reach 10 kicks. Write down how much time it takes to count 10 kicks.      It may take 30 minutes to 2 hours to count 10 kicks. It should not take more  than 2 hours to count 10 kicks.     Follow up with your doctor as directed:  Write down your questions so you remember to ask them during your visits.   ©  Mer2023 Information is for End User's use only and may not be sold, redistributed or otherwise used for commercial purposes.  The above information is an  only. It is not intended as medical advice for individual conditions or treatments. Talk to your doctor, nurse or pharmacist before following any medical regimen to see if it is safe and effective for you. Thank you for choosing us for your  care today.  If you have any questions about your ultrasound or care, please do not hesitate to contact us or your primary obstetrician.        Some general instructions for your pregnancy are:    Exercise: Aim for 150 minutes per week of regular exercise.  Walking is great!  Nutrition: Choose healthy sources of calcium, iron, and protein.  Avoid ultraprocessed foods and added sugar.  Learn about Preeclampsia: preeclampsia is a common, potentially serious high blood pressure complication in pregnancy.  A blood pressure of 140mmHg (systolic or top number) or 90mmHg (diastolic or bottom number) should be evaluated by your doctor.  Aspirin is sometimes prescribed in early pregnancy to prevent preeclampsia in women with risk factors - ask your obstetrician if you should be on this medication.  For more resources, visit:  https://www.highriskpregnancyinfo.org/preeclampsia  If you smoke, please try to quit completely but also try to reduce your smoking by as much as possible (as soon as possible).  Do not vape.  Please also avoid cannabis products.  Other warning signs to watch out for in pregnancy or postpartum: chest pain, obstructed breathing or shortness of breath, seizures, thoughts of hurting yourself or your baby, bleeding, a painful or swollen leg, fever, or headache (see [facility] POST-BIRTH Warning Signs campaign).  If these happen  call 911.  Itching is also not normal in pregnancy and if you experience this, especially over your hands and feet, potentially worse at night, notify your doctors.      Nonstress Test for Pregnancy   WHAT YOU NEED TO KNOW:   What do I need to know about a nonstress test?  A nonstress test measures your baby's heart rate and movements. Nonstress means that no stress will be placed on your baby during the test.  How do I prepare for a nonstress test?  Your healthcare provider will talk to you about how to prepare for this test. Your provider may tell you to eat and drink plenty of liquids before your test. If you smoke, you may be asked not to smoke within 2 hours before the test. Your provider will also tell you which medicines to take or not take on the day of your test.  What will happen during a nonstress test?  You may be asked to lie down or recline back for the test on a bed. One or 2 belts with sensors will be placed around your abdomen. Your baby's heart rate will be recorded with a machine. If your baby does not move, your baby may be asleep. Your healthcare provider may make a noise near your abdomen to try to wake your baby. The test usually takes about 20 minutes, but can take longer if your baby needs to be awakened.        What do I need to know about the test results?  Your baby will be expected to move at least 2 times for a certain amount of time. Your baby's heart rate will be expected to go up by a certain number of beats per minute during movement. If your baby does not move as expected, the test may need to be repeated or you may need other tests.  CARE AGREEMENT:   You have the right to help plan your care. Learn about your health condition and how it may be treated. Discuss treatment options with your healthcare providers to decide what care you want to receive. You always have the right to refuse treatment. The above information is an  only. It is not intended as medical advice  for individual conditions or treatments. Talk to your doctor, nurse or pharmacist before following any medical regimen to see if it is safe and effective for you.  © Copyright [employer] 2023 Information is for End User's use only and may not be sold, redistributed or otherwise used for commercial purposes.     Kick Counts in Pregnancy   AMBULATORY CARE:   Kick counts  measure how much your baby is moving in your womb. A kick from your baby can be felt as a twist, turn, swish, roll, or jab. It is common to feel your baby kicking at 26 to 28 weeks of pregnancy. You may feel your baby kick as early as 20 weeks of pregnancy. You may want to start counting at 28 weeks.   Contact your doctor immediately if:   You feel a change in the number of kicks or movements of your baby.      You feel fewer than 10 kicks within 2 hours.      You have questions or concerns about your baby's movements.     Why measure kick counts:  Your baby's movement may provide information about your baby's health. He or she may move less, or not at all, if there are problems. Your baby may move less if he or she is not getting enough oxygen or nutrition from the placenta. Do not smoke while you are pregnant. Smoking decreases the amount of oxygen that gets to your baby. Talk to your healthcare provider if you need help to quit smoking. Tell your healthcare provider as soon as you feel a change in your baby's movements.  When to measure kick counts:   Measure kick counts at the same time every day.       Measure kick counts when your baby is awake and most active. Your baby may be most active in the evening.     How to measure kick counts:  Check that your baby is awake before you measure kick counts. You can wake up your baby by lightly pushing on your belly, walking, or drinking something cold. Your healthcare provider may tell you different ways to measure kick counts. You may be told to do the following:  Use a chart or clock to keep track of the  time you start and finish counting.      Sit in a chair or lie on your left side.      Place your hands on the largest part of your belly.      Count until you reach 10 kicks. Write down how much time it takes to count 10 kicks.      It may take 30 minutes to 2 hours to count 10 kicks. It should not take more than 2 hours to count 10 kicks.     Follow up with your doctor as directed:  Write down your questions so you remember to ask them during your visits.   © Copyright [employer] 2023 Information is for End User's use only and may not be sold, redistributed or otherwise used for commercial purposes.  The above information is an  only. It is not intended as medical advice for individual conditions or treatments. Talk to your doctor, nurse or pharmacist before following any medical regimen to see if it is safe and effective for you.

## 2024-10-24 NOTE — TELEPHONE ENCOUNTER
Spoke w/pt in regards to scheduled IOL date/time.  Scheduled at [facility] L/D 11/18/24 at 6am.  Pt aware and agreeable      ----- Message from Radha Mendes MD sent at 10/24/2024  9:50 AM EDT -----  Procedure to be scheduled (IOL or CS): IOL    AUSTYN: Estimated Date of Delivery: 11/26/24     Indication for delivery: Diabetes, HTN    Requested date (s) of delivery: 11/18   If requested date is unavailable, is there a date by which the pt must be delivered?    Physician preference: Vaughn    Cervical Exam Dilation: 3cm, Effacement:  80%, Station:  -1, Consistency: soft, and Position:  posterior    If IOL, anticipated method: pitocin or amniotomy    AM or PM IOL?am    If CS, with or without tubal:

## 2024-10-24 NOTE — PROGRESS NOTES
Routine Prenatal Visit  [facility] [address] [place]    Assessment/Plan:  Anila is a 29 y.o. year old  at 35w2d who presents for routine prenatal visit.     1. Encounter for supervision of other normal pregnancy in third trimester  2. Need for RSV immunization  3. 35 weeks gestation of pregnancy  4. Insulin controlled gestational diabetes mellitus (GDM) in third trimester        Subjective:     CC: Prenatal care    Anila Cheng is a 29 y.o.  female who presents for routine prenatal care at 35w2d.  Pregnancy ROS: no leakage of fluid, pelvic pain, or vaginal bleeding.  good fetal movement.    The following portions of the patient's history were reviewed and updated as appropriate: allergies, current medications, past family history, past medical history, obstetric history, gynecologic history, past social history, past surgical history and problem list.      Objective:  /70   Wt 109 kg (241 lb)   LMP 2024 (Approximate)   BMI 41.37 kg/m²   Pregravid Weight/BMI: 102 kg (224 lb) (BMI 38.43)  Current Weight: 109 kg (241 lb)   Total Weight Gain: 7.711 kg (17 lb)   Pre-Herbert Vitals      Flowsheet Row Most Recent Value   Prenatal Assessment    Fetal Heart Rate 145   Movement Present   Prenatal Vitals    Blood Pressure 120/70   Weight - Scale 109 kg (241 lb)   Urine Albumin/Glucose    Dilation/Effacement/Station    Vaginal Drainage    Draining Fluid No   Edema    LLE Edema None   RLE Edema None   Facial Edema None             General: Well appearing, no distress  Respiratory: Unlabored breathing  Cardiovascular: Regular rate.  Abdomen: Soft, gravid, nontender  Fundal Height: Appropriate for gestational age.  Extremities: Warm and well perfused.  Non tender.

## 2024-10-25 ENCOUNTER — HOSPITAL ENCOUNTER (OUTPATIENT)
Facility: HOSPITAL | Age: 29
Discharge: HOME | End: 2024-10-25
Attending: OBSTETRICS & GYNECOLOGY | Admitting: OBSTETRICS & GYNECOLOGY
Payer: COMMERCIAL

## 2024-10-25 ENCOUNTER — ULTRASOUND (OUTPATIENT)
Dept: PERINATAL CARE | Facility: OTHER | Age: 29
End: 2024-10-25
Payer: COMMERCIAL

## 2024-10-25 VITALS
HEIGHT: 64 IN | BODY MASS INDEX: 41.04 KG/M2 | SYSTOLIC BLOOD PRESSURE: 110 MMHG | HEART RATE: 76 BPM | DIASTOLIC BLOOD PRESSURE: 70 MMHG | WEIGHT: 240.4 LBS

## 2024-10-25 VITALS
DIASTOLIC BLOOD PRESSURE: 71 MMHG | SYSTOLIC BLOOD PRESSURE: 131 MMHG | RESPIRATION RATE: 18 BRPM | HEART RATE: 82 BPM | TEMPERATURE: 97.6 F

## 2024-10-25 DIAGNOSIS — O24.414 INSULIN CONTROLLED GESTATIONAL DIABETES MELLITUS (GDM) IN THIRD TRIMESTER: Primary | ICD-10-CM

## 2024-10-25 DIAGNOSIS — O10.919 CHRONIC HYPERTENSION AFFECTING PREGNANCY: ICD-10-CM

## 2024-10-25 DIAGNOSIS — Z3A.35 35 WEEKS GESTATION OF PREGNANCY: ICD-10-CM

## 2024-10-25 PROBLEM — O36.8390 FETAL HEART RATE/RHYTHM ABNORMALITY, ANTEPARTUM: Status: ACTIVE | Noted: 2024-10-25

## 2024-10-25 PROCEDURE — 76818 FETAL BIOPHYS PROFILE W/NST: CPT | Performed by: OBSTETRICS & GYNECOLOGY

## 2024-10-25 PROCEDURE — NC001 PR NO CHARGE: Performed by: OBSTETRICS & GYNECOLOGY

## 2024-10-25 PROCEDURE — 99214 OFFICE O/P EST MOD 30 MIN: CPT | Performed by: OBSTETRICS & GYNECOLOGY

## 2024-10-25 PROCEDURE — 99212 OFFICE O/P EST SF 10 MIN: CPT

## 2024-10-25 RX ORDER — ACETAMINOPHEN 325 MG/1
975 TABLET ORAL ONCE
Status: COMPLETED | OUTPATIENT
Start: 2024-10-25 | End: 2024-10-25

## 2024-10-25 RX ADMIN — ACETAMINOPHEN 975 MG: 325 TABLET, FILM COATED ORAL at 14:49

## 2024-10-25 NOTE — LETTER
"   Date: 10/25/2024    [facility]  [address]  [address]  [facility] [address]    Patient: Anila Cheng   YOB: 1995   Date of Visit: 10/25/2024   Gestational age 35w3d   Nature of this communication: Priority: sending to L&D for monitoring extended       This patient was seen recently in our  office.  Please see ultrasound report under \"OB Procedures\" tab.  Please don't hesitate to contact our office with any concerns or questions.      Sincerely,      Anais England MD  Attending Physician, Maternal-Fetal Medicine  [facility]      " 11-Aug-2023 16:26

## 2024-10-25 NOTE — PROGRESS NOTES
[facility]: Ms. Cheng was seen today for  NST/NINA .  Her NST began with what appears to be a spontaneous 3 minute deceleration; this was followed by a reactive tracing with moderate variability throughout and accelerations.  To contractions which followed the deceleration were not followed by a deceleration.  NINA done subsequently showed a BPP of 8 out of 8.    Given GA of 35w3d I recommend extended monitoring.  OB team at [facility] will be notified.    The time spent on this established patient on the encounter date included 5 minutes previsit service time reviewing records and precharting, 5 minutes face-to-face service time counseling regarding results and coordinating care, and  4 minutes charting, totalling 14 minutes.  Please don't hesitate to contact our office with any concerns or questions.  -Anais England MD

## 2024-10-25 NOTE — PROGRESS NOTES
L&D Triage Note - OB/GYN  Anila Cheng 29 y.o. female MRN: [id number]  Unit/Bed#:  TRIAGE 2 Encounter: [id number]      ASSESSMENT/PLAN  Anila Cheng is a 29 y.o.  at 35w3d here for decel in the [facility] office with reassuring monitoring and 8/9 BPP to follow, mild headache, and occasional contractions      1) Extended fetal monitoring  - FHT reactive for 1 hr with no decels, accels, and moderate variability  - 8/8 BPP in [facility] today  - Continue APFS    2) Mild range headache  - headache 2/2, completely resolved with tylenol    3) occasional contractions   - SVE 3/80/-1 and very posterior, unchanged from office yesterday  - Contractions resolved     4)  Discharge instructions  - Patient instructed to call if experiencing worsening contractions, vaginal bleeding, loss of fluid or decreased fetal movement.  - Will follow up with OBGYN in office      She is a [facility] of [facility]  D/w Dr. Rosenberg, on call OBGYN Attending Physician  ______________    SUBJECTIVE    AUSTYN: Estimated Date of Delivery: 24    HPI:  29 y.o.  35w3d presents with complaint of being sent from the maternal-fetal medicine office for the decel.  Patient then had a reactive NST and a 8 out of 8 BPP.  Patient reports she has a mild headache that is 2 out of 10 in intensity and gets frequent headaches this pregnancy.  She has not tried taking anything for this headache.  Patient also reports she is feeling mild contractions every 13 minutes.  She has no further complaints    Contractions: as described   Leakage of fluid: no  Vaginal Bleeding: no  Fetal movement: present    Her obstetrical history is significant for Obesity, GDMA2, hypothyroidism, cHTN, migraines    ROS:  Constitutional: Negative  Respiratory: Negative  Cardiovascular: Negative    Gastrointestinal: Negative    Physical Exam  General: Well appearing, no distress  Respiratory: Unlabored breathing  Cardiovascular: Regular rate  Abdomen: Soft,  "gravid, nontender   Fundal Height: Appropriate for gestational age.  Extremities: Warm and well perfused.  Non tender.      OBJECTIVE:  /71 (BP Location: Right arm)   Pulse 82   Temp 97.6 °F (36.4 °C) (Temporal)   Resp 18   LMP 01/31/2024 (Approximate)   There is no height or weight on file to calculate BMI.  Labs: No results found for this or any previous visit (from the past 24 hour(s)).      SVE:  Cervical Dilation: 3  Cervical Effacement: 80  Cervical Consistency: Soft  Fetal Station: -1  Presentation: Vertex  Method: Manual  OB Examiner: [place]    FHT:  Baseline Rate (FHR): 145 bpm  Variability: Moderate  Accelerations: 15 x 15 or greater  Decelerations: None    TOCO:   Contraction Frequency (minutes): 5-7  Contraction Duration (seconds):   Contraction Intensity: Mild      Juanita Her DO  OB/GYN   10/25/2024  4:10 PM      Portions of the record may have been created with voice recognition software.  Occasional wrong word or \"sound a like\" substitutions may have occurred due to the inherent limitations of voice recognition software.  Read the chart carefully and recognize, using context, where substitutions have occurred   "

## 2024-10-25 NOTE — PROGRESS NOTES
NST was done today. Pt. Reported active fetal movement at home between visit. Daily fetal kick count reviewed and emphasized. Patient verbalized understanding of all and was receptive.  Please also see Dr. England's note.

## 2024-10-28 LAB — GP B STREP DNA SPEC QL NAA+PROBE: NEGATIVE

## 2024-10-29 ENCOUNTER — ULTRASOUND (OUTPATIENT)
Dept: PERINATAL CARE | Facility: OTHER | Age: 29
End: 2024-10-29
Payer: COMMERCIAL

## 2024-10-29 VITALS
WEIGHT: 240.8 LBS | SYSTOLIC BLOOD PRESSURE: 122 MMHG | HEIGHT: 64 IN | HEART RATE: 72 BPM | DIASTOLIC BLOOD PRESSURE: 66 MMHG | BODY MASS INDEX: 41.11 KG/M2

## 2024-10-29 DIAGNOSIS — Z3A.36 36 WEEKS GESTATION OF PREGNANCY: ICD-10-CM

## 2024-10-29 DIAGNOSIS — E66.01 SEVERE OBESITY DUE TO EXCESS CALORIES AFFECTING PREGNANCY IN THIRD TRIMESTER (HCC): ICD-10-CM

## 2024-10-29 DIAGNOSIS — E03.9 HYPOTHYROIDISM DURING PREGNANCY, THIRD TRIMESTER: ICD-10-CM

## 2024-10-29 DIAGNOSIS — O99.283 HYPOTHYROIDISM DURING PREGNANCY, THIRD TRIMESTER: ICD-10-CM

## 2024-10-29 DIAGNOSIS — O10.919 CHRONIC HYPERTENSION AFFECTING PREGNANCY: ICD-10-CM

## 2024-10-29 DIAGNOSIS — Z36.89 ENCOUNTER FOR ULTRASOUND TO ASSESS FETAL GROWTH: ICD-10-CM

## 2024-10-29 DIAGNOSIS — O99.213 SEVERE OBESITY DUE TO EXCESS CALORIES AFFECTING PREGNANCY IN THIRD TRIMESTER (HCC): ICD-10-CM

## 2024-10-29 DIAGNOSIS — O24.414 INSULIN CONTROLLED GESTATIONAL DIABETES MELLITUS (GDM) IN THIRD TRIMESTER: Primary | ICD-10-CM

## 2024-10-29 PROBLEM — O16.9 HYPERTENSION AFFECTING PREGNANCY: Status: RESOLVED | Noted: 2024-10-15 | Resolved: 2024-10-29

## 2024-10-29 PROCEDURE — 76816 OB US FOLLOW-UP PER FETUS: CPT | Performed by: OBSTETRICS & GYNECOLOGY

## 2024-10-29 PROCEDURE — 59025 FETAL NON-STRESS TEST: CPT | Performed by: NURSE PRACTITIONER

## 2024-10-29 PROCEDURE — 99214 OFFICE O/P EST MOD 30 MIN: CPT | Performed by: NURSE PRACTITIONER

## 2024-10-29 NOTE — PROGRESS NOTES
Repeat Non-Stress Testing:    Patient verbalizes +FM. Pt denies ALL:               Leaking of fluid   Contractions   Vaginal bleeding   Decreased fetal movement    Patient is performing daily kick counts. Patient has no questions or concerns.   NST strip reviewed by STEPHANIE Mcwilliams.

## 2024-10-29 NOTE — LETTER
"  Date: 10/29/2024    Arsh Moore MD  [address] [place] [address]    Patient: Anila Cheng   YOB: 1995   Date of Visit: 10/29/2024   Gestational age 36w0d   Nature of this communication: Routine       This patient was seen recently in our  office.  Please see ultrasound report under \"OB Procedures\" tab.  Please don't hesitate to contact our office with any concerns or questions.      Sincerely,      Modesta Henriquez MD  Attending Physician, Maternal-Fetal Medicine  [facility]       "

## 2024-10-29 NOTE — PROGRESS NOTES
"[facility]: Ms. Cheng was seen today at 36w0d gestational age for NST (found under the pregnancy episode) which I reviewed the RN assessment and agree, and fetal growth ultrasound (see ultrasound report under OB procedures tab).  See ultrasound report under \"OB Procedures\" tab.    Reshma BROWN    "

## 2024-10-31 NOTE — PATIENT INSTRUCTIONS
Thank you for choosing us for your  care today.  If you have any questions about your ultrasound or care, please do not hesitate to contact us or your primary obstetrician.        Some general instructions for your pregnancy are:    Exercise: Aim for 150 minutes per week of regular exercise.  Walking is great!  Nutrition: Choose healthy sources of calcium, iron, and protein.  Avoid ultraprocessed foods and added sugar.  Learn about Preeclampsia: preeclampsia is a common, potentially serious high blood pressure complication in pregnancy.  A blood pressure of 140mmHg (systolic or top number) or 90mmHg (diastolic or bottom number) should be evaluated by your doctor.  Aspirin is sometimes prescribed in early pregnancy to prevent preeclampsia in women with risk factors - ask your obstetrician if you should be on this medication.  For more resources, visit:  https://www.highriskpregnancyinfo.org/preeclampsia  If you smoke, please try to quit completely but also try to reduce your smoking by as much as possible (as soon as possible).  Do not vape.  Please also avoid cannabis products.  Other warning signs to watch out for in pregnancy or postpartum: chest pain, obstructed breathing or shortness of breath, seizures, thoughts of hurting yourself or your baby, bleeding, a painful or swollen leg, fever, or headache (see [facility] POST-BIRTH Warning Signs campaign).  If these happen call 911.  Itching is also not normal in pregnancy and if you experience this, especially over your hands and feet, potentially worse at night, notify your doctors.         Nonstress Test for Pregnancy   WHAT YOU NEED TO KNOW:   What do I need to know about a nonstress test?  A nonstress test measures your baby's heart rate and movements. Nonstress means that no stress will be placed on your baby during the test.  How do I prepare for a nonstress test?  Your healthcare provider will talk to you about how to prepare for this test. Your provider  may tell you to eat and drink plenty of liquids before your test. If you smoke, you may be asked not to smoke within 2 hours before the test. Your provider will also tell you which medicines to take or not take on the day of your test.  What will happen during a nonstress test?  You may be asked to lie down or recline back for the test on a bed. One or 2 belts with sensors will be placed around your abdomen. Your baby's heart rate will be recorded with a machine. If your baby does not move, your baby may be asleep. Your healthcare provider may make a noise near your abdomen to try to wake your baby. The test usually takes about 20 minutes, but can take longer if your baby needs to be awakened.        What do I need to know about the test results?  Your baby will be expected to move at least 2 times for a certain amount of time. Your baby's heart rate will be expected to go up by a certain number of beats per minute during movement. If your baby does not move as expected, the test may need to be repeated or you may need other tests.  CARE AGREEMENT:   You have the right to help plan your care. Learn about your health condition and how it may be treated. Discuss treatment options with your healthcare providers to decide what care you want to receive. You always have the right to refuse treatment. The above information is an  only. It is not intended as medical advice for individual conditions or treatments. Talk to your doctor, nurse or pharmacist before following any medical regimen to see if it is safe and effective for you.  © Copyright [employer] 2023 Information is for End User's use only and may not be sold, redistributed or otherwise used for commercial purposes.       Kick Counts in Pregnancy   AMBULATORY CARE:   Kick counts  measure how much your baby is moving in your womb. A kick from your baby can be felt as a twist, turn, swish, roll, or jab. It is common to feel your baby kicking at 26 to 28  weeks of pregnancy. You may feel your baby kick as early as 20 weeks of pregnancy. You may want to start counting at 28 weeks.   Contact your doctor immediately if:   You feel a change in the number of kicks or movements of your baby.      You feel fewer than 10 kicks within 2 hours.      You have questions or concerns about your baby's movements.     Why measure kick counts:  Your baby's movement may provide information about your baby's health. He or she may move less, or not at all, if there are problems. Your baby may move less if he or she is not getting enough oxygen or nutrition from the placenta. Do not smoke while you are pregnant. Smoking decreases the amount of oxygen that gets to your baby. Talk to your healthcare provider if you need help to quit smoking. Tell your healthcare provider as soon as you feel a change in your baby's movements.  When to measure kick counts:   Measure kick counts at the same time every day.       Measure kick counts when your baby is awake and most active. Your baby may be most active in the evening.     How to measure kick counts:  Check that your baby is awake before you measure kick counts. You can wake up your baby by lightly pushing on your belly, walking, or drinking something cold. Your healthcare provider may tell you different ways to measure kick counts. You may be told to do the following:  Use a chart or clock to keep track of the time you start and finish counting.      Sit in a chair or lie on your left side.      Place your hands on the largest part of your belly.      Count until you reach 10 kicks. Write down how much time it takes to count 10 kicks.      It may take 30 minutes to 2 hours to count 10 kicks. It should not take more than 2 hours to count 10 kicks.     Follow up with your doctor as directed:  Write down your questions so you remember to ask them during your visits.   © Copyright [employer] 2023 Information is for End User's use only and may not be  sold, redistributed or otherwise used for commercial purposes.  The above information is an  only. It is not intended as medical advice for individual conditions or treatments. Talk to your doctor, nurse or pharmacist before following any medical regimen to see if it is safe and effective for you.

## 2024-11-01 ENCOUNTER — ROUTINE PRENATAL (OUTPATIENT)
Dept: PERINATAL CARE | Facility: OTHER | Age: 29
End: 2024-11-01
Payer: COMMERCIAL

## 2024-11-01 ENCOUNTER — NURSE TRIAGE (OUTPATIENT)
Dept: OTHER | Facility: OTHER | Age: 29
End: 2024-11-01

## 2024-11-01 ENCOUNTER — HOSPITAL ENCOUNTER (OUTPATIENT)
Facility: HOSPITAL | Age: 29
Discharge: HOME | End: 2024-11-01
Attending: OBSTETRICS & GYNECOLOGY | Admitting: OBSTETRICS & GYNECOLOGY
Payer: COMMERCIAL

## 2024-11-01 VITALS
SYSTOLIC BLOOD PRESSURE: 107 MMHG | HEART RATE: 66 BPM | WEIGHT: 238.8 LBS | HEIGHT: 64 IN | DIASTOLIC BLOOD PRESSURE: 67 MMHG | BODY MASS INDEX: 40.77 KG/M2

## 2024-11-01 VITALS
SYSTOLIC BLOOD PRESSURE: 138 MMHG | DIASTOLIC BLOOD PRESSURE: 71 MMHG | RESPIRATION RATE: 18 BRPM | HEART RATE: 93 BPM | TEMPERATURE: 98.4 F | BODY MASS INDEX: 40.77 KG/M2 | HEIGHT: 64 IN | WEIGHT: 238.8 LBS

## 2024-11-01 DIAGNOSIS — Z3A.36 36 WEEKS GESTATION OF PREGNANCY: Primary | ICD-10-CM

## 2024-11-01 DIAGNOSIS — O24.414 INSULIN CONTROLLED GESTATIONAL DIABETES MELLITUS (GDM) IN THIRD TRIMESTER: ICD-10-CM

## 2024-11-01 PROBLEM — O47.9 IRREGULAR UTERINE CONTRACTIONS: Status: ACTIVE | Noted: 2024-11-01

## 2024-11-01 LAB
BILIRUB UR QL STRIP: NEGATIVE
CLARITY UR: CLEAR
COLOR UR: COLORLESS
GLUCOSE UR STRIP-MCNC: NEGATIVE MG/DL
HGB UR QL STRIP.AUTO: NEGATIVE
KETONES UR STRIP-MCNC: ABNORMAL MG/DL
LEUKOCYTE ESTERASE UR QL STRIP: NEGATIVE
NITRITE UR QL STRIP: NEGATIVE
PH UR STRIP.AUTO: 5.5 [PH]
PROT UR STRIP-MCNC: NEGATIVE MG/DL
SP GR UR STRIP.AUTO: 1.01 (ref 1–1.03)
UROBILINOGEN UR STRIP-ACNC: <2 MG/DL

## 2024-11-01 PROCEDURE — 59025 FETAL NON-STRESS TEST: CPT | Performed by: OBSTETRICS & GYNECOLOGY

## 2024-11-01 PROCEDURE — 81003 URINALYSIS AUTO W/O SCOPE: CPT

## 2024-11-01 PROCEDURE — NC001 PR NO CHARGE: Performed by: OBSTETRICS & GYNECOLOGY

## 2024-11-01 PROCEDURE — 99212 OFFICE O/P EST SF 10 MIN: CPT

## 2024-11-01 NOTE — TELEPHONE ENCOUNTER
"Reason for Disposition  • Leakage of fluid from vagina    Answer Assessment - Initial Assessment Questions  1. LOCATION: \"Where does it hurt?\"       Whole abdominal   2. RADIATION: \"Does the pain shoot anywhere else?\" (e.g., chest, back)      Denies   3. ONSET: \"When did the pain begin?\" (Minutes, hours or days ago)       1500  4. SUDDEN: \"Gradual or sudden onset?\"      Sudden   5. PATTERN: \"Does the pain come and go, or has it been constant since it started?\"       Irregular intermittent   6. SEVERITY: \"How bad is the pain?\" \"What does it keep you from doing?\"  (e.g., Scale 1-10; mild, moderate, or severe)      5/10  7. RECURRENT SYMPTOM: \"Have you ever had this type of stomach pain before?\" If Yes, ask: \"When was the last time?\" and \"What happened that time?\"       Denies   8. CAUSE: \"What do you think is causing the stomach pain?      Unsure   9. RELIEVING/AGGRAVATING FACTORS: \"What makes it better or worse?\" (e.g., antacids, bowel movement, movement)      Denies   10. FETAL MOVEMENT: \"Has the baby's movement decreased or changed significantly from normal?\"        Baseline   11. OTHER SYMPTOMS: \"Do you have any other symptoms?\" (e.g., back pain, contractions, diarrhea, fever, headache, urination pain, vaginal bleeding, vaginal discharge, vomiting)       A lot of pressure, and rectal pressure.  Slow constant leaking onset today 1500  12. AUSTYN: \"What date are you expecting to deliver?\"        11/26    Protocols used: Pregnancy - Abdominal Pain Greater Than 20 Weeks EGA-Adult-AH    "

## 2024-11-01 NOTE — TELEPHONE ENCOUNTER
Gestation:36W3D  AUSTYN:     Baseline FM  c/o intermittent irregular abdominal pain/tightening since 1500 rated 5/10, with increased rectal pressure, stating “I feel like I have to poop”, and increased pelvic pressure. New onset slow vaginal fluid leaking, consistent since . No additional symptoms.      On call provider contacted and informed of patient's concerns and status.    Patient reports she is en-route to [facility]. ETA 45 mins. Provider updated.     L&D charge nurse notified.

## 2024-11-01 NOTE — TELEPHONE ENCOUNTER
Regardin Weeks pregnant having a lot of pressure, cramping & slighlyt leaking  ----- Message from Barbara LAZO sent at 2024  6:44 PM EDT -----  '' I'm 36 weeks pregnant having a lot of pressure, cramping and slightly leaking concerned and looking for medical advice.''

## 2024-11-02 NOTE — DISCHARGE INSTR - AVS FIRST PAGE
You were evaluated for abdominal pain and found to have irregular contractions. Your cervical exam is unchanged compared to last exam on 10/25/24. Return if you have persistent contractions, fever, vomiting.

## 2024-11-02 NOTE — PROGRESS NOTES
L&D Triage Note - OB/GYN  Anila Cheng 29 y.o. female MRN: [id number]  Unit/Bed#:  TRIAGE  Encounter: [id number]      ASSESSMENT:    Anila Cheng is a 29 y.o.  at 36w3d who was evaluated today in triage for abdominal pain, vaginal discharge. SVE 3/50/-1 with irregular contractions on tocometer. The patient does not appear to be in labor and it is safe to discharge home.     PLAN:    1) Contractions  - SVE: 3/50/-1  - [place]: Irregular contractions  - Encouraged PO hydration, warm bath/shower, reassurance  -Patient offered pain medication, however patient declines.    2) 36 weeks gestation of pregnancy  - Continue routine prenatal care  - Discharge from OB triage with labor precautions    - Reviewed rupture of membranes, false vs true labor, decreased fetal movement, and vaginal bleeding   - Pt to call provider with any concerns and follow up at her next scheduled prenatal appointment    - Case discussed with Dr. Aguero    SUBJECTIVE:    Anila Cheng 29 y.o.  at 36w3d with an Estimated Date of Delivery: 24 presenting with abdominal pain.  Patient reports intermittent nonradiating generalized abdominal cramping over the past week, associate with nausea.  Patient also reports increased pressure in her buttocks.  Patient further reports of increased vaginal discharge, and increased frequency of urination.  Patient is not taking any medication for the pain, denies any exacerbating or relieving factors.    Denies fever, chills, chest pain, shortness of breath, nausea, vomiting, diarrhea, constipation, blood in stool, blood in urine, headache, lightheadedness, decreased frequency of urination, abnormal smell, vaginal bleeding, dysuria.      Her past obstetrical history is significant for preeclampsia in her second pregnancy, 2 prior spontaneous vaginal deliveries. This pregnancy has been complicated by insulin treated gestational diabetes, 36 units basal  insulin.    Contractions: Irregular contractions on tocometry  Leakage of fluid: Denies, however reports increased vaginal discharge  Vaginal Bleeding: Denies  Fetal movement: present    OBJECTIVE:    Vitals:    11/01/24 2033   BP: 138/71   Pulse:    Resp:    Temp:        ROS:  Constitutional: Negative  Respiratory: Negative  Cardiovascular: Negative    Gastrointestinal: Negative    General Physical Exam:  General: Well appearing, no distress  Respiratory: Unlabored breathing  Cardiovascular: Regular rate.  Abdomen: Soft, gravid, nontender  Fundal Height: Appropriate for gestational age.  Extremities: Warm and well perfused.  Non tender.      Fetal monitoring:  Fetal heart rate: Baseline 140s 150s, moderate variability, 15 x 15, positive accelerations, no decelerations.  [place]: Irregular contractions      Cervical Exam  SVE: 3/50/-1    KOH/WTMT:     Infection:   - no clue cells    - no hyphae   - no trichomonads present    Membrane status   - no ferning   - negative nitrazine   - no pooling        Martinez Duggan, DO  11/1/2024  11:00 PM

## 2024-11-04 NOTE — PATIENT INSTRUCTIONS
Thank you for choosing us for your  care today.  If you have any questions about your ultrasound or care, please do not hesitate to contact us or your primary obstetrician.        Some general instructions for your pregnancy are:    Exercise: Aim for 150 minutes per week of regular exercise.  Walking is great!  Nutrition: Choose healthy sources of calcium, iron, and protein.  Avoid ultraprocessed foods and added sugar.  Learn about Preeclampsia: preeclampsia is a common, potentially serious high blood pressure complication in pregnancy.  A blood pressure of 140mmHg (systolic or top number) or 90mmHg (diastolic or bottom number) should be evaluated by your doctor.  Aspirin is sometimes prescribed in early pregnancy to prevent preeclampsia in women with risk factors - ask your obstetrician if you should be on this medication.  For more resources, visit:  https://[email or web address].[email or web address]  If you smoke, please try to quit completely but also try to reduce your smoking by as much as possible (as soon as possible).  Do not vape.  Please also avoid cannabis products.  Other warning signs to watch out for in pregnancy or postpartum: chest pain, obstructed breathing or shortness of breath, seizures, thoughts of hurting yourself or your baby, bleeding, a painful or swollen leg, fever, or headache (see [facility] POST[facility] Warning Signs campaign).  If these happen call 911.  Itching is also not normal in pregnancy and if you experience this, especially over your hands and feet, potentially worse at night, notify your doctors.     Kick Counts in Pregnancy   AMBULATORY CARE:   Kick counts  measure how much your baby is moving in your womb. A kick from your baby can be felt as a twist, turn, swish, roll, or jab. It is common to feel your baby kicking at 26 to 28 weeks of pregnancy. You may feel your baby kick as early as 20 weeks of pregnancy. You may want to start counting at 28 weeks.   Contact your  doctor immediately if:   You feel a change in the number of kicks or movements of your baby.      You feel fewer than 10 kicks within 2 hours.      You have questions or concerns about your baby's movements.     Why measure kick counts:  Your baby's movement may provide information about your baby's health. He or she may move less, or not at all, if there are problems. Your baby may move less if he or she is not getting enough oxygen or nutrition from the placenta. Do not smoke while you are pregnant. Smoking decreases the amount of oxygen that gets to your baby. Talk to your healthcare provider if you need help to quit smoking. Tell your healthcare provider as soon as you feel a change in your baby's movements.  When to measure kick counts:   Measure kick counts at the same time every day.       Measure kick counts when your baby is awake and most active. Your baby may be most active in the evening.     How to measure kick counts:  Check that your baby is awake before you measure kick counts. You can wake up your baby by lightly pushing on your belly, walking, or drinking something cold. Your healthcare provider may tell you different ways to measure kick counts. You may be told to do the following:  Use a chart or clock to keep track of the time you start and finish counting.      Sit in a chair or lie on your left side.      Place your hands on the largest part of your belly.      Count until you reach 10 kicks. Write down how much time it takes to count 10 kicks.      It may take 30 minutes to 2 hours to count 10 kicks. It should not take more than 2 hours to count 10 kicks.     Follow up with your doctor as directed:  Write down your questions so you remember to ask them during your visits.   © Copyright [employer] 2023 Information is for End User's use only and may not be sold, redistributed or otherwise used for commercial purposes.  The above information is an  only. It is not intended as  medical advice for individual conditions or treatments. Talk to your doctor, nurse or pharmacist before following any medical regimen to see if it is safe and effective for you. Nonstress Test for Pregnancy   WHAT YOU NEED TO KNOW:   What do I need to know about a nonstress test?  A nonstress test measures your baby's heart rate and movements. Nonstress means that no stress will be placed on your baby during the test.  How do I prepare for a nonstress test?  Your healthcare provider will talk to you about how to prepare for this test. Your provider may tell you to eat and drink plenty of liquids before your test. If you smoke, you may be asked not to smoke within 2 hours before the test. Your provider will also tell you which medicines to take or not take on the day of your test.  What will happen during a nonstress test?  You may be asked to lie down or recline back for the test on a bed. One or 2 belts with sensors will be placed around your abdomen. Your baby's heart rate will be recorded with a machine. If your baby does not move, your baby may be asleep. Your healthcare provider may make a noise near your abdomen to try to wake your baby. The test usually takes about 20 minutes, but can take longer if your baby needs to be awakened.        What do I need to know about the test results?  Your baby will be expected to move at least 2 times for a certain amount of time. Your baby's heart rate will be expected to go up by a certain number of beats per minute during movement. If your baby does not move as expected, the test may need to be repeated or you may need other tests.  CARE AGREEMENT:   You have the right to help plan your care. Learn about your health condition and how it may be treated. Discuss treatment options with your healthcare providers to decide what care you want to receive. You always have the right to refuse treatment. The above information is an  only. It is not intended as  medical advice for individual conditions or treatments. Talk to your doctor, nurse or pharmacist before following any medical regimen to see if it is safe and effective for you.  © Copyright Merative 2023 Information is for End User's use only and may not be sold, redistributed or otherwise used for commercial purposes.

## 2024-11-05 ENCOUNTER — ROUTINE PRENATAL (OUTPATIENT)
Dept: PERINATAL CARE | Facility: OTHER | Age: 29
End: 2024-11-05
Payer: COMMERCIAL

## 2024-11-05 VITALS
HEIGHT: 64 IN | HEART RATE: 90 BPM | BODY MASS INDEX: 41.01 KG/M2 | WEIGHT: 240.2 LBS | SYSTOLIC BLOOD PRESSURE: 130 MMHG | DIASTOLIC BLOOD PRESSURE: 76 MMHG

## 2024-11-05 DIAGNOSIS — Z3A.37 37 WEEKS GESTATION OF PREGNANCY: ICD-10-CM

## 2024-11-05 DIAGNOSIS — O24.414 INSULIN CONTROLLED GESTATIONAL DIABETES MELLITUS (GDM) IN THIRD TRIMESTER: Primary | ICD-10-CM

## 2024-11-05 PROCEDURE — 59025 FETAL NON-STRESS TEST: CPT | Performed by: OBSTETRICS & GYNECOLOGY

## 2024-11-07 ENCOUNTER — ROUTINE PRENATAL (OUTPATIENT)
Dept: OBGYN CLINIC | Facility: CLINIC | Age: 29
End: 2024-11-07

## 2024-11-07 VITALS
BODY MASS INDEX: 40.97 KG/M2 | HEIGHT: 64 IN | WEIGHT: 240 LBS | SYSTOLIC BLOOD PRESSURE: 124 MMHG | DIASTOLIC BLOOD PRESSURE: 72 MMHG

## 2024-11-07 DIAGNOSIS — E66.01 SEVERE OBESITY DUE TO EXCESS CALORIES AFFECTING PREGNANCY IN THIRD TRIMESTER (HCC): ICD-10-CM

## 2024-11-07 DIAGNOSIS — O99.213 SEVERE OBESITY DUE TO EXCESS CALORIES AFFECTING PREGNANCY IN THIRD TRIMESTER (HCC): ICD-10-CM

## 2024-11-07 DIAGNOSIS — Z30.2 REQUEST FOR STERILIZATION: ICD-10-CM

## 2024-11-07 DIAGNOSIS — O99.283 HYPOTHYROIDISM DURING PREGNANCY, THIRD TRIMESTER: ICD-10-CM

## 2024-11-07 DIAGNOSIS — Z3A.36 36 WEEKS GESTATION OF PREGNANCY: Primary | ICD-10-CM

## 2024-11-07 DIAGNOSIS — E03.9 HYPOTHYROIDISM DURING PREGNANCY, THIRD TRIMESTER: ICD-10-CM

## 2024-11-07 DIAGNOSIS — O24.414 INSULIN CONTROLLED GESTATIONAL DIABETES MELLITUS (GDM) IN THIRD TRIMESTER: ICD-10-CM

## 2024-11-07 DIAGNOSIS — O10.919 CHRONIC HYPERTENSION AFFECTING PREGNANCY: ICD-10-CM

## 2024-11-07 PROCEDURE — PNV: Performed by: OBSTETRICS & GYNECOLOGY

## 2024-11-07 NOTE — PROGRESS NOTES
"Routine Prenatal Visit  [facility] [address], [facility] [place]      OB/GYN Prenatal Visit    ASSESSMENT / PLAN:  1. 36 weeks gestation of pregnancy  Assessment & Plan:  GBS negative   2. Chronic hypertension affecting pregnancy  Assessment & Plan:  No meds  Asa to stop today   3. Hypothyroidism during pregnancy, third trimester  Assessment & Plan:  Lab Results   Component Value Date    NWQ9HPHSTLRP 1.763 2024    TSH 2.60 2023     Synthroid dose is currently - 112  4. Insulin controlled gestational diabetes mellitus (GDM) in third trimester  Assessment & Plan:  - Reports sugars to Diabetes in Pregnancy   NST 2 x week       Lab Results   Component Value Date    HGBA1C 5.1 2024       5. Severe obesity due to excess calories affecting pregnancy in third trimester (HCC)  6. Request for sterilization        SUBJECTIVE:  Anila Cheng is a 29 y.o.  at 37 here for prenatal visit.  She has o obstetric complaints and denies pelvic pain, cramping/contractions, vaginal bleeding, loss of fluid, or decreased fetal movement.   Starting to feel lots of pelvic pressure       The following portions of the patient's history were reviewed and updated as appropriate: allergies, current medications, past family history, past medical history, obstetric history, gynecologic history, past social history, past surgical history and problem list.      Objective:  /72   Ht 5' 4\" (1.626 m)   Wt 109 kg (240 lb)   LMP 2024 (Approximate)   BMI 41.20 kg/m²   Pregravid Weight/BMI: 102 kg (224 lb) (BMI 38.43)  Current Weight: 109 kg (240 lb)   Total Weight Gain: 7.258 kg (16 lb)   Pre- Vitals      Flowsheet Row Most Recent Value   Prenatal Assessment    Movement Present   Prenatal Vitals    Blood Pressure 124/72   Weight - Scale 109 kg (240 lb)   Urine Albumin/Glucose    Dilation/Effacement/Station    Vaginal Drainage    Draining Fluid No   Edema    LLE Edema None "   RLE Edema None   Facial Edema None               Physical Exam:    General: Well appearing, no distress  Respiratory: Unlabored breathing  Cardiovascular: Regular rate.  Abdomen: Soft, gravid, nontender  Fundal Height: Appropriate for gestational age.  Extremities: Warm and well perfused.  Non tender.      Arsh Moore MD

## 2024-11-07 NOTE — ASSESSMENT & PLAN NOTE
- Reports sugars to Diabetes in Pregnancy   NST 2 x week       Lab Results   Component Value Date    HGBA1C 5.1 08/07/2024

## 2024-11-07 NOTE — ASSESSMENT & PLAN NOTE
Lab Results   Component Value Date    HJP6LFHEXQTJ 1.763 08/07/2024    TSH 2.60 12/14/2023     Synthroid dose is currently - 112

## 2024-11-08 ENCOUNTER — ULTRASOUND (OUTPATIENT)
Dept: PERINATAL CARE | Facility: OTHER | Age: 29
End: 2024-11-08
Payer: COMMERCIAL

## 2024-11-08 VITALS
HEART RATE: 77 BPM | DIASTOLIC BLOOD PRESSURE: 77 MMHG | SYSTOLIC BLOOD PRESSURE: 123 MMHG | HEIGHT: 64 IN | BODY MASS INDEX: 40.97 KG/M2 | WEIGHT: 240 LBS

## 2024-11-08 DIAGNOSIS — Z3A.37 37 WEEKS GESTATION OF PREGNANCY: Primary | ICD-10-CM

## 2024-11-08 DIAGNOSIS — O24.414 INSULIN CONTROLLED GESTATIONAL DIABETES MELLITUS (GDM) IN THIRD TRIMESTER: ICD-10-CM

## 2024-11-08 PROCEDURE — 59025 FETAL NON-STRESS TEST: CPT | Performed by: STUDENT IN AN ORGANIZED HEALTH CARE EDUCATION/TRAINING PROGRAM

## 2024-11-08 PROCEDURE — 76815 OB US LIMITED FETUS(S): CPT | Performed by: STUDENT IN AN ORGANIZED HEALTH CARE EDUCATION/TRAINING PROGRAM

## 2024-11-08 NOTE — PROGRESS NOTES
This patient received  care under my supervision on 24 at 37w3d gestational age at [facility].  NST is reactive.  -Romi Kaur MD

## 2024-11-11 ENCOUNTER — ROUTINE PRENATAL (OUTPATIENT)
Dept: PERINATAL CARE | Facility: OTHER | Age: 29
End: 2024-11-11
Payer: COMMERCIAL

## 2024-11-11 DIAGNOSIS — Z3A.37 37 WEEKS GESTATION OF PREGNANCY: ICD-10-CM

## 2024-11-11 DIAGNOSIS — O24.414 INSULIN CONTROLLED GESTATIONAL DIABETES MELLITUS (GDM) IN THIRD TRIMESTER: Primary | ICD-10-CM

## 2024-11-11 PROCEDURE — 59025 FETAL NON-STRESS TEST: CPT | Performed by: OBSTETRICS & GYNECOLOGY

## 2024-11-15 ENCOUNTER — ROUTINE PRENATAL (OUTPATIENT)
Dept: OBGYN CLINIC | Facility: CLINIC | Age: 29
End: 2024-11-15
Payer: COMMERCIAL

## 2024-11-15 VITALS — BODY MASS INDEX: 42.02 KG/M2 | DIASTOLIC BLOOD PRESSURE: 70 MMHG | WEIGHT: 244.8 LBS | SYSTOLIC BLOOD PRESSURE: 136 MMHG

## 2024-11-15 DIAGNOSIS — O99.283 HYPOTHYROIDISM DURING PREGNANCY, THIRD TRIMESTER: ICD-10-CM

## 2024-11-15 DIAGNOSIS — Z87.59 HISTORY OF GESTATIONAL HYPERTENSION: ICD-10-CM

## 2024-11-15 DIAGNOSIS — O10.919 CHRONIC HYPERTENSION AFFECTING PREGNANCY: ICD-10-CM

## 2024-11-15 DIAGNOSIS — O99.213 OBESITY AFFECTING PREGNANCY IN THIRD TRIMESTER, UNSPECIFIED OBESITY TYPE: ICD-10-CM

## 2024-11-15 DIAGNOSIS — Z30.2 REQUEST FOR STERILIZATION: ICD-10-CM

## 2024-11-15 DIAGNOSIS — Z34.93 THIRD TRIMESTER PREGNANCY: Primary | ICD-10-CM

## 2024-11-15 DIAGNOSIS — E03.9 HYPOTHYROIDISM DURING PREGNANCY, THIRD TRIMESTER: ICD-10-CM

## 2024-11-15 DIAGNOSIS — Z3A.38 38 WEEKS GESTATION OF PREGNANCY: ICD-10-CM

## 2024-11-15 DIAGNOSIS — O24.414 INSULIN CONTROLLED GESTATIONAL DIABETES MELLITUS (GDM) IN THIRD TRIMESTER: ICD-10-CM

## 2024-11-15 PROCEDURE — 59025 FETAL NON-STRESS TEST: CPT | Performed by: OBSTETRICS & GYNECOLOGY

## 2024-11-15 PROCEDURE — 76815 OB US LIMITED FETUS(S): CPT | Performed by: OBSTETRICS & GYNECOLOGY

## 2024-11-15 PROCEDURE — PNV: Performed by: OBSTETRICS & GYNECOLOGY

## 2024-11-15 NOTE — PROGRESS NOTES
Assessment  29 y.o.  at 38w3d presenting for routine prenatal visit.     Plan  Diagnoses and all orders for this visit:    Third trimester pregnancy  38 weeks gestation of pregnancy  - Labor precautions  - FKC  - IOL upcoming    Chronic hypertension affecting pregnancy  History of gestational hypertension  - BP wnl  - Asymptomatic    Insulin controlled gestational diabetes mellitus (GDM) in third trimester  - Continue insulin, glucose checking, and reporting  - Follows with MFM  - Ongoing APFS  - IOL scheduled    Hypothyroidism during pregnancy, third trimester  - Continue levothyroxine    Request for sterilization  - If c/s    ____________________________________________________________        Subjective    Anila Cheng is a 29 y.o.  at 38w3d who presents for routine prenatal visit. She is noting some blood stained mucoid discharge, intermittent ctxns. Come in runs, as close as q8-9min but then resolve. Denies loss of fluid, or vaginal bleeding. She feels regular fetal movements.     Pregnancy Problems:  Patient Active Problem List   Diagnosis    Hypothyroidism during pregnancy, third trimester    Bradycardia    Elevated brain natriuretic peptide (BNP) level    Insulin controlled gestational diabetes mellitus (GDM) in third trimester    Obesity affecting pregnancy in third trimester    38 weeks gestation of pregnancy    Anxiety state    History of gestational hypertension    Chronic hypertension affecting pregnancy    Request for sterilization    Obesity, class 3    Pregnancy headache in third trimester    Chest pain    Palpitations    Fetal heart rate/rhythm abnormality, antepartum    Irregular uterine contractions         Objective  /70   Wt 111 kg (244 lb 12.8 oz)   LMP 2024 (Approximate)   BMI 42.02 kg/m²     NST:  [place]:  NINA: 145 BPM / moderate / +accels  Quiet  9.6cm   Uterine Size: size equals dates   Presentations: cephalic   Pelvic Exam:     Dilation: 3cm    Effacement:  50%    Station:  -3    Consistency: soft    Position: middle     Physical Exam:  Physical Exam  Constitutional:       General: She is not in acute distress.     Appearance: Normal appearance. She is well-developed. She is not ill-appearing, toxic-appearing or diaphoretic.   HENT:      Head: Normocephalic and atraumatic.   Eyes:      General: No scleral icterus.        Right eye: No discharge.         Left eye: No discharge.      Conjunctiva/sclera: Conjunctivae normal.   Pulmonary:      Effort: Pulmonary effort is normal. No accessory muscle usage or respiratory distress.   Abdominal:      General: There is distension (gravid).      Tenderness: There is no abdominal tenderness. There is no guarding or rebound.   Skin:     General: Skin is warm and dry.      Coloration: Skin is not jaundiced.      Findings: No bruising, erythema or rash.   Neurological:      Mental Status: She is alert.   Psychiatric:         Mood and Affect: Mood normal.         Behavior: Behavior normal.         Thought Content: Thought content normal.         Judgment: Judgment normal.

## 2024-11-16 ENCOUNTER — HOSPITAL ENCOUNTER (OUTPATIENT)
Facility: HOSPITAL | Age: 29
Discharge: HOME | End: 2024-11-16
Attending: OBSTETRICS & GYNECOLOGY | Admitting: OBSTETRICS & GYNECOLOGY
Payer: COMMERCIAL

## 2024-11-16 VITALS — SYSTOLIC BLOOD PRESSURE: 128 MMHG | HEART RATE: 93 BPM | DIASTOLIC BLOOD PRESSURE: 63 MMHG

## 2024-11-16 PROBLEM — O36.8190 DECREASED FETAL MOVEMENT: Status: ACTIVE | Noted: 2024-11-16

## 2024-11-16 PROCEDURE — 76815 OB US LIMITED FETUS(S): CPT

## 2024-11-16 PROCEDURE — 99213 OFFICE O/P EST LOW 20 MIN: CPT | Performed by: OBSTETRICS & GYNECOLOGY

## 2024-11-16 PROCEDURE — 59025 FETAL NON-STRESS TEST: CPT

## 2024-11-16 PROCEDURE — NC001 PR NO CHARGE: Performed by: OBSTETRICS & GYNECOLOGY

## 2024-11-16 PROCEDURE — 99212 OFFICE O/P EST SF 10 MIN: CPT

## 2024-11-16 NOTE — PROGRESS NOTES
L&D Triage Note - OB/GYN  Anila Cheng 29 y.o. female MRN: [id number]  Unit/Bed#: LD TRIAGE  Encounter: [id number]    Patient is seen by [facility].    ASSESSMENT  Anila Cheng is a 29 y.o.  at 38w4d here for decreased fetal movement. She reports feeling improved movement upon arrival to triage and is reassured after hearing fetal heart beat. NINA wnl. SVE performed due to recent membrane sweep in office and occasional contractions on monitor; unchanged from office exam. Appropriate for d/c home with strict return precautions.    PLAN  #1. Decreased fetal movement:   NST reactive  NINA 13.87    #2. Uterine contractions:   Q12 on tocometry, patient not feeling them  Recently had membrane sweep in office and SVE at that time was 3/50/-3  SVE unchanged today  Continue routine prenatal care; IOL scheduled 24.    Discharge instructions  Patient instructed to call if experiencing worsening contractions, vaginal bleeding, loss of fluid or decreased fetal movement.  Will follow up with OBGYN per routine prenatal care; IOL scheduled for 24.    D/w Dr. Gonzalez.  ______________    SUBJECTIVE    AUSTYN: Estimated Date of Delivery: 24    HPI:  29 y.o.  38w4d presents with complaint of decreased fetal movement. She reports feeling decreased movement since her membrane sweep yesterday in the office. She is otherwise without complaint.    Contractions: denies  Leakage of fluid: denies  Vaginal Bleeding: denies  Fetal movement: as above    Her obstetrical history is significant for SVDx2.    ROS:  Constitutional: Negative  Respiratory: Negative  Cardiovascular: Negative    Gastrointestinal: Negative    OBJECTIVE:    Vitals:  /63   Pulse 93   LMP 2024 (Approximate)   There is no height or weight on file to calculate BMI.    Physical Exam  Vitals reviewed.   Constitutional:       Appearance: Normal appearance.   Abdominal:      Comments: Gravid, non-tender   Genitourinary:      General: Normal vulva.   Musculoskeletal:         General: Normal range of motion.   Skin:     General: Skin is warm and dry.   Neurological:      Mental Status: She is alert.         SVE: 3/50/-3  Method: Manual  OB Examiner: [facility]    FHT:  Baseline Rate (FHR): 145 bpm  Variability: Moderate  Accelerations: 15 x 15 or greater  Decelerations: None    TOCO:   Contraction Frequency (minutes): q12    IMAGING:      TAUS   NINA      - Q1 4.49 cm     - Q2 1.97 cm     - Q3 2.50 cm     - Q4 4.91 cm     - Total: 13.87 cm   Placenta: fundal, posterior   Presentation: cephalic    Labs: No results found for this or any previous visit (from the past 24 hours).    Cammie Hurtado MD  11/16/2024  7:17 PM

## 2024-11-17 NOTE — PROCEDURES
Anila Cheng, a  at 38w4d with an AUSTYN of 2024, by Ultrasound, was seen at [facility] for the following procedure(s): $Procedure Type: NINA, NST]    Nonstress Test  Reason for NST: Decreased Fetal Movement  Variability: Moderate  Decelerations: None  Accelerations: Yes  Baseline: 145 BPM  Uterine Irritability: No  Contractions: Irregular  Contraction Frequency (minutes): 12 min    4 Quadrant NINA  NINA Q1 (cm): 4.5 cm  NINA Q2 (cm): 2 cm  NINA Q3 (cm): 2.5 cm  NINA Q4 (cm): 4.9 cm  NINA TOTAL (cm): 13.9 cm              Interpretation  Nonstress Test Interpretation: Reactive                  Cammie Hurtado MD  OBGYN PGY2

## 2024-11-18 ENCOUNTER — HOSPITAL ENCOUNTER (INPATIENT)
Facility: HOSPITAL | Age: 29
LOS: 1 days | Discharge: HOME | End: 2024-11-19
Attending: OBSTETRICS & GYNECOLOGY | Admitting: OBSTETRICS & GYNECOLOGY
Payer: COMMERCIAL

## 2024-11-18 ENCOUNTER — HOSPITAL ENCOUNTER (OUTPATIENT)
Dept: LABOR AND DELIVERY | Facility: HOSPITAL | Age: 29
Discharge: HOME | End: 2024-11-18
Payer: COMMERCIAL

## 2024-11-18 ENCOUNTER — TELEPHONE (OUTPATIENT)
Dept: OBGYN CLINIC | Facility: MEDICAL CENTER | Age: 29
End: 2024-11-18

## 2024-11-18 ENCOUNTER — ANESTHESIA EVENT (INPATIENT)
Dept: ANESTHESIOLOGY | Facility: HOSPITAL | Age: 29
End: 2024-11-18
Payer: COMMERCIAL

## 2024-11-18 ENCOUNTER — ANESTHESIA (INPATIENT)
Dept: ANESTHESIOLOGY | Facility: HOSPITAL | Age: 29
End: 2024-11-18
Payer: COMMERCIAL

## 2024-11-18 DIAGNOSIS — Z3A.38 38 WEEKS GESTATION OF PREGNANCY: ICD-10-CM

## 2024-11-18 LAB
ABO GROUP BLD: NORMAL
ALBUMIN SERPL BCG-MCNC: 3.5 G/DL (ref 3.5–5)
ALP SERPL-CCNC: 90 U/L (ref 34–104)
ALT SERPL W P-5'-P-CCNC: 12 U/L (ref 7–52)
ANION GAP SERPL CALCULATED.3IONS-SCNC: 8 MMOL/L (ref 4–13)
AST SERPL W P-5'-P-CCNC: 18 U/L (ref 13–39)
BASE EXCESS BLDCOA CALC-SCNC: -2.9 MMOL/L (ref 3–11)
BASE EXCESS BLDCOV CALC-SCNC: -4.3 MMOL/L (ref 1–9)
BILIRUB SERPL-MCNC: 0.34 MG/DL (ref 0.2–1)
BLD GP AB SCN SERPL QL: NEGATIVE
BUN SERPL-MCNC: 19 MG/DL (ref 5–25)
CALCIUM SERPL-MCNC: 9.3 MG/DL (ref 8.4–10.2)
CHLORIDE SERPL-SCNC: 104 MMOL/L (ref 96–108)
CO2 SERPL-SCNC: 22 MMOL/L (ref 21–32)
CREAT SERPL-MCNC: 0.63 MG/DL (ref 0.6–1.3)
CREAT UR-MCNC: 39.1 MG/DL
ERYTHROCYTE [DISTWIDTH] IN BLOOD BY AUTOMATED COUNT: 13.6 % (ref 11.6–15.1)
GFR SERPL CREATININE-BSD FRML MDRD: 121 ML/MIN/1.73SQ M
GLUCOSE SERPL-MCNC: 89 MG/DL (ref 65–140)
GLUCOSE SERPL-MCNC: 91 MG/DL (ref 65–140)
GLUCOSE SERPL-MCNC: 98 MG/DL (ref 65–140)
HCO3 BLDCOA-SCNC: 22 MMOL/L (ref 17.3–27.3)
HCO3 BLDCOV-SCNC: 20.2 MMOL/L (ref 12.2–28.6)
HCT VFR BLD AUTO: 35.1 % (ref 34.8–46.1)
HGB BLD-MCNC: 11.6 G/DL (ref 11.5–15.4)
MCH RBC QN AUTO: 30.2 PG (ref 26.8–34.3)
MCHC RBC AUTO-ENTMCNC: 33 G/DL (ref 31.4–37.4)
MCV RBC AUTO: 91 FL (ref 82–98)
O2 CT VFR BLDCOA CALC: 17.1 ML/DL
OXYHGB MFR BLDCOA: 80.2 %
OXYHGB MFR BLDCOV: 91.2 %
PCO2 BLDCOA: 38.8 MM[HG] (ref 30–60)
PCO2 BLDCOV: 35.7 MM HG (ref 27–43)
PH BLDCOA: 7.37 [PH] (ref 7.23–7.43)
PH BLDCOV: 7.37 [PH] (ref 7.19–7.49)
PLATELET # BLD AUTO: 272 THOUSANDS/UL (ref 149–390)
PMV BLD AUTO: 9.5 FL (ref 8.9–12.7)
PO2 BLDCOA: 36.7 MM HG (ref 5–25)
PO2 BLDCOV: 50.4 MM HG (ref 15–45)
POTASSIUM SERPL-SCNC: 4 MMOL/L (ref 3.5–5.3)
PROT SERPL-MCNC: 7 G/DL (ref 6.4–8.4)
PROT UR-MCNC: <4 MG/DL
RBC # BLD AUTO: 3.84 MILLION/UL (ref 3.81–5.12)
RH BLD: POSITIVE
SAO2 % BLDCOV: 19.4 ML/DL
SODIUM SERPL-SCNC: 134 MMOL/L (ref 135–147)
SPECIMEN EXPIRATION DATE: NORMAL
TREPONEMA PALLIDUM IGG+IGM AB [PRESENCE] IN SERUM OR PLASMA BY IMMUNOASSAY: NORMAL
WBC # BLD AUTO: 14.4 THOUSAND/UL (ref 4.31–10.16)

## 2024-11-18 PROCEDURE — 82805 BLOOD GASES W/O2 SATURATION: CPT | Performed by: OBSTETRICS & GYNECOLOGY

## 2024-11-18 PROCEDURE — 59400 OBSTETRICAL CARE: CPT | Performed by: OBSTETRICS & GYNECOLOGY

## 2024-11-18 PROCEDURE — 86900 BLOOD TYPING SEROLOGIC ABO: CPT

## 2024-11-18 PROCEDURE — 82948 REAGENT STRIP/BLOOD GLUCOSE: CPT

## 2024-11-18 PROCEDURE — NC001 PR NO CHARGE: Performed by: OBSTETRICS & GYNECOLOGY

## 2024-11-18 PROCEDURE — 86901 BLOOD TYPING SEROLOGIC RH(D): CPT

## 2024-11-18 PROCEDURE — 82570 ASSAY OF URINE CREATININE: CPT

## 2024-11-18 PROCEDURE — 86780 TREPONEMA PALLIDUM: CPT

## 2024-11-18 PROCEDURE — 80053 COMPREHEN METABOLIC PANEL: CPT

## 2024-11-18 PROCEDURE — 86850 RBC ANTIBODY SCREEN: CPT

## 2024-11-18 PROCEDURE — 84156 ASSAY OF PROTEIN URINE: CPT

## 2024-11-18 PROCEDURE — 85027 COMPLETE CBC AUTOMATED: CPT

## 2024-11-18 PROCEDURE — 4A1HXCZ MONITORING OF PRODUCTS OF CONCEPTION, CARDIAC RATE, EXTERNAL APPROACH: ICD-10-PCS | Performed by: OBSTETRICS & GYNECOLOGY

## 2024-11-18 RX ORDER — LEVOTHYROXINE SODIUM 112 UG/1
112 TABLET ORAL
Status: DISCONTINUED | OUTPATIENT
Start: 2024-11-18 | End: 2024-11-20 | Stop reason: HOSPADM

## 2024-11-18 RX ORDER — SIMETHICONE 80 MG
80 TABLET,CHEWABLE ORAL 4 TIMES DAILY PRN
Status: DISCONTINUED | OUTPATIENT
Start: 2024-11-18 | End: 2024-11-20 | Stop reason: HOSPADM

## 2024-11-18 RX ORDER — ESCITALOPRAM OXALATE 10 MG/1
10 TABLET ORAL DAILY
Status: DISCONTINUED | OUTPATIENT
Start: 2024-11-18 | End: 2024-11-20 | Stop reason: HOSPADM

## 2024-11-18 RX ORDER — BUPIVACAINE HYDROCHLORIDE 2.5 MG/ML
30 INJECTION, SOLUTION EPIDURAL; INFILTRATION; INTRACAUDAL ONCE AS NEEDED
Status: DISCONTINUED | OUTPATIENT
Start: 2024-11-18 | End: 2024-11-20 | Stop reason: HOSPADM

## 2024-11-18 RX ORDER — LIDOCAINE HYDROCHLORIDE AND EPINEPHRINE 15; 5 MG/ML; UG/ML
INJECTION, SOLUTION EPIDURAL
Status: COMPLETED | OUTPATIENT
Start: 2024-11-18 | End: 2024-11-18

## 2024-11-18 RX ORDER — DIPHENHYDRAMINE HCL 25 MG
25 TABLET ORAL EVERY 6 HOURS PRN
Status: DISCONTINUED | OUTPATIENT
Start: 2024-11-18 | End: 2024-11-20 | Stop reason: HOSPADM

## 2024-11-18 RX ORDER — DOCUSATE SODIUM 100 MG/1
100 CAPSULE, LIQUID FILLED ORAL 2 TIMES DAILY
Status: DISCONTINUED | OUTPATIENT
Start: 2024-11-18 | End: 2024-11-20 | Stop reason: HOSPADM

## 2024-11-18 RX ORDER — OXYTOCIN/RINGER'S LACTATE 30/500 ML
1-30 PLASTIC BAG, INJECTION (ML) INTRAVENOUS
Status: DISCONTINUED | OUTPATIENT
Start: 2024-11-18 | End: 2024-11-20 | Stop reason: HOSPADM

## 2024-11-18 RX ORDER — CALCIUM CARBONATE 500 MG/1
1000 TABLET, CHEWABLE ORAL DAILY PRN
Status: DISCONTINUED | OUTPATIENT
Start: 2024-11-18 | End: 2024-11-20 | Stop reason: HOSPADM

## 2024-11-18 RX ORDER — ONDANSETRON 2 MG/ML
4 INJECTION INTRAMUSCULAR; INTRAVENOUS EVERY 8 HOURS PRN
Status: DISCONTINUED | OUTPATIENT
Start: 2024-11-18 | End: 2024-11-20 | Stop reason: HOSPADM

## 2024-11-18 RX ORDER — BENZOCAINE/MENTHOL 6 MG-10 MG
1 LOZENGE MUCOUS MEMBRANE DAILY PRN
Status: DISCONTINUED | OUTPATIENT
Start: 2024-11-18 | End: 2024-11-20 | Stop reason: HOSPADM

## 2024-11-18 RX ORDER — IBUPROFEN 600 MG/1
600 TABLET, FILM COATED ORAL EVERY 6 HOURS
Status: DISCONTINUED | OUTPATIENT
Start: 2024-11-18 | End: 2024-11-20 | Stop reason: HOSPADM

## 2024-11-18 RX ORDER — ACETAMINOPHEN 325 MG/1
650 TABLET ORAL EVERY 4 HOURS PRN
Status: DISCONTINUED | OUTPATIENT
Start: 2024-11-18 | End: 2024-11-20 | Stop reason: HOSPADM

## 2024-11-18 RX ORDER — SODIUM CHLORIDE, SODIUM LACTATE, POTASSIUM CHLORIDE, CALCIUM CHLORIDE 600; 310; 30; 20 MG/100ML; MG/100ML; MG/100ML; MG/100ML
125 INJECTION, SOLUTION INTRAVENOUS CONTINUOUS
Status: DISCONTINUED | OUTPATIENT
Start: 2024-11-18 | End: 2024-11-20 | Stop reason: HOSPADM

## 2024-11-18 RX ORDER — OXYTOCIN/RINGER'S LACTATE 30/500 ML
250 PLASTIC BAG, INJECTION (ML) INTRAVENOUS CONTINUOUS
Status: ACTIVE | OUTPATIENT
Start: 2024-11-18 | End: 2024-11-18

## 2024-11-18 RX ORDER — ONDANSETRON 2 MG/ML
4 INJECTION INTRAMUSCULAR; INTRAVENOUS EVERY 6 HOURS PRN
Status: DISCONTINUED | OUTPATIENT
Start: 2024-11-18 | End: 2024-11-18 | Stop reason: SDUPTHER

## 2024-11-18 RX ADMIN — Medication 2 MILLI-UNITS/MIN: at 09:26

## 2024-11-18 RX ADMIN — ROPIVACAINE HYDROCHLORIDE: 2 INJECTION, SOLUTION EPIDURAL; INFILTRATION at 09:21

## 2024-11-18 RX ADMIN — SODIUM CHLORIDE, SODIUM LACTATE, POTASSIUM CHLORIDE, AND CALCIUM CHLORIDE 999 ML/HR: .6; .31; .03; .02 INJECTION, SOLUTION INTRAVENOUS at 07:45

## 2024-11-18 RX ADMIN — HYDROCORTISONE 1 APPLICATION: 1 CREAM TOPICAL at 14:57

## 2024-11-18 RX ADMIN — DOCUSATE SODIUM 100 MG: 100 CAPSULE, LIQUID FILLED ORAL at 14:39

## 2024-11-18 RX ADMIN — ACETAMINOPHEN 650 MG: 325 TABLET, FILM COATED ORAL at 17:53

## 2024-11-18 RX ADMIN — BENZOCAINE AND LEVOMENTHOL 1 APPLICATION: 200; 5 SPRAY TOPICAL at 14:57

## 2024-11-18 RX ADMIN — IBUPROFEN 600 MG: 600 TABLET, FILM COATED ORAL at 14:38

## 2024-11-18 RX ADMIN — LIDOCAINE HYDROCHLORIDE AND EPINEPHRINE 3 ML: 15; 5 INJECTION, SOLUTION EPIDURAL at 09:07

## 2024-11-18 RX ADMIN — IBUPROFEN 600 MG: 600 TABLET, FILM COATED ORAL at 20:28

## 2024-11-18 RX ADMIN — SODIUM CHLORIDE, SODIUM LACTATE, POTASSIUM CHLORIDE, AND CALCIUM CHLORIDE 999 ML/HR: .6; .31; .03; .02 INJECTION, SOLUTION INTRAVENOUS at 09:01

## 2024-11-18 NOTE — ASSESSMENT & PLAN NOTE
Continue routine post partum care  Encourage ambulation  Encourage breastfeeding  Contraception: Undecided - Sterilization declines bridge  Anticipate discharge PPD 1 vs 2

## 2024-11-18 NOTE — DISCHARGE SUMMARY
Discharge Summary - OB/GYN   Name: Anila Cheng 29 y.o. female I MRN: [id number]  Unit/Bed#: -01 I Date of Admission: 2024   Date of Service: 2024 I Hospital Day: 1      Obstetrics Discharge Summary  Anila Cheng 29 y.o. female MRN: [id number]  Unit/Bed#: -01 Encounter: [id number]    Admission Date: 2024     Discharge Date: 2024    Admitting Attending: Dr. Mendes  Delivery Attending: Dr. Mendes  Discharging Attending: Dr. Villafana    Admitting Diagnoses:   1. Pregnancy at 38w6d   2. cHTN  3. A2GDM    Discharge Diagnoses:   1. Same as above  2. Delivery of term     Delivery  Route of Delivery: Vaginal, Spontaneous    Anesthesia: Epidural,   QBL: 0 ml    Delivery: Vaginal, Spontaneous at 2024 11:39 AM  No Laceration: Perineal: None     Baby's Weight: 2870 g (6 lb 5.2 oz); 101.24    Apgar scores: 8  and 9  at 1 and 5 minutes, respectively      Hospital course: Anila Cheng is now a 29 y.o.  who was initially admitted at 38w6d in the setting of A2GDM and chronic hypertension. Patient SROM clear in the way to the maternity, the received pitocin and an epidural was placed. She progressed to complete cervical dilation and began pushing.     Her post-delivery course was complicated by chronic hypertension. She was not on medications, and most of her blood pressures were normotensive. Her postpartum pain was well controlled with oral analgesics. Maternal blood type is AB positive so RhoGAM was not indicated.    On day of discharge, she was ambulating and able to reasonably perform all ADLs. She was voiding and had appropriate bowel function. Pain was well controlled. She was discharged home on postpartum day #1 without complications. Patient was instructed to follow up with her OBGYN as an outpatient and was given appropriate warnings to call provider if she develops signs of infection or uncontrolled pain.    Complications: none  apparent    Condition at discharge: good     Provisions for Follow-Up Care:  Please see after visit summary for information related to follow-up care and any pertinent home health orders.      Disposition: Home    Planned Readmission: No    Discharge Medications:   Please see AVS for a complete list of discharge medications.    Discharge instructions :   -Do not place anything (no partner, tampons or douche) in your vagina for 6 weeks  -You may walk for exercise for the first 6 weeks then gradually return to your usual activities   -Please do not drive for 1 week if you have no stitches and for 2 weeks if you have stitches    -You may take baths or shower per your preference   -Please examine your breasts in the mirror daily and call your doctor for redness or tenderness or increased warmth    -Please call your doctor's office if temperature > 100.4*F or 38* C, worsening pain or a foul discharge.    Albert Choi MD  Obstetrics & Gynecology PGY-1  11/19/2024  6:43 AM

## 2024-11-18 NOTE — ANESTHESIA POSTPROCEDURE EVALUATION
Post-Op Assessment Note    CV Status:  Stable    Pain management: adequate      Post-op block assessment: catheter intact and no complications   Mental Status:  Alert and awake   Hydration Status:  Stable   PONV Controlled:  None   Airway Patency:  Patent    No anethesia notable event occurred.    Staff: Anesthesiologist           Last Filed PACU Vitals:  Vitals Value Taken Time   Temp     Pulse 67 11/18/24 1211   /68 11/18/24 1211   Resp     SpO2     Vitals shown include unfiled device data.    Modified Surjit:  No data recorded

## 2024-11-18 NOTE — L&D DELIVERY NOTE
OBGYN Vaginal Delivery Summary  Anila Cheng 29 y.o. female MRN: [id number]  Unit/Bed#: -01 Encounter: [id number]    Predelivery Diagnosis:  1. Pregnancy at 38w6d   2. cHTN  3. A2GDM    Postdelivery Diagnosis:  1. Same as above  2. Delivery of term     Procedure: spontaneous vaginal delivery, repair of no laceration(s)    Attending: Dr. Mendes    Assistant: Dr. Joyce Gautam    Anesthesia: Epidural    QBL: 0 mL  Admission H.6g/dL  Admission platelets: 272 thousands/uL    Complications: none apparent    Specimens: cord blood, arterial and venous cord blood gases, placenta to storage.     Findings:   1. Viable female at 1139, with APGARS of 8 and 9 at 1 and 5 minutes respectively. Weight pending at time of dictation.  2. Spontaneous delivery of intact placenta at 1142. Centrally insertion three vessel umbilical cord  3. No laceration  4. Blood gases:  Umbilical Cord Venous Blood Gas:  Results from last 7 days   Lab Units 24  1141   PH COV  7.371   PCO2 COV mm HG 35.7   HCO3 COV mmol/L 20.2   BASE EXC COV mmol/L -4.3*   O2 CT CD VB mL/dL 19.4   O2 HGB, VENOUS CORD % 91.2     Umbilical Cord Arterial Blood Gas:  Results from last 7 days   Lab Units 24  1141   PH COA  7.371   PCO2 COA  38.8   PO2 COA mm HG 36.7*   HCO3 COA mmol/L 22.0   BASE EXC COA mmol/L -2.9*   O2 CONTENT CORD ART ml/dl 17.1   O2 HGB, ARTERIAL CORD % 80.2       Disposition:  Patient tolerated the procedure well and was recovering in labor and delivery room.    Brief history and labor course:  Anila Cheng is a 29 y.o.  at 38wk6d. She presented to labor and delivery for induction of labor in the setting of A2GDM and chronic hypertension. Patient SROM clear in the way to the maternity, the received pitocin and an epidural was placed. She progressed to complete cervical dilation and began pushing.     Description of procedure  After pushing for one minute, the patient delivered a viable female   at 1139 on 2024, weight pending at time of dictation, apgars of 8 (1 min) and 9 (5 min). The fetal vertex delivered spontaneously. Baby restituted  to LOT. There was  no nuchal cord. The anterior (right) shoulder delivered atraumatically with maternal expulsive forces and the assistance of gentle downward traction. The posterior shoulder delivered with maternal expulsive forces and the assistance of gentle upward traction. The remainder of the fetus delivered spontaneously.     Upon delivery the infant was placed on the mother's abdomen and delayed cord clamping was performed. The umbilical cord was then doubly clamped and cut. The infant was noted to cry spontaneously and was moving all extremities appropriately. There was no evidence for injury. Awaiting nurse resuscitators evaluated the . Arterial and venous cord blood gases and cord blood were collected for analysis and were promptly sent to the lab. In the immediate post-partum, IV pitocin was administered, and the uterus was noted to contract down well with massage and pitocin. The placenta delivered spontaneously at 1142 and was noted to be intact and had a centrally inserted 3 vessel cord. The placenta was sent to storage.    The vagina, cervix, perineum, and rectum were inspected. No laceration(s) were noted.   At the conclusion of the procedure, all needle, sponge, and instrument counts were noted to be correct. Patient tolerated the procedure well and was allowed to recover in labor and delivery room with family and  before being transferred to the post-partum floor.     Dr. Mendes was present and participated in all key portions of the case.    Joyce Silva MD  2024  12:46 PM

## 2024-11-18 NOTE — TELEPHONE ENCOUNTER
Spoke with patient and we chose Jan 9, 2025 for her surgery case.. Case entered and labs ordered.. My chart message sent and teams number given.

## 2024-11-18 NOTE — ANESTHESIA PREPROCEDURE EVALUATION
Procedure:  LABOR ANALGESIA    Relevant Problems   CARDIO   (+) Chest pain   (+) Chronic hypertension affecting pregnancy      ENDO   (+) Hypothyroidism during pregnancy, third trimester      GYN   (+) 38 weeks gestation of pregnancy      NEURO/PSYCH   (+) Anxiety state   (+) Pregnancy headache in third trimester      Obstetrics/Gynecology   (+) Obesity affecting pregnancy in third trimester (Resolved)        Physical Exam    Airway    Mallampati score: III  TM Distance: >3 FB  Neck ROM: full     Dental   No notable dental hx     Cardiovascular  Rhythm: regular, Rate: normal, Cardiovascular exam normal    Pulmonary  Pulmonary exam normal Breath sounds clear to auscultation    Other Findings  post-pubertal.      Anesthesia Plan  ASA Score- 3     Anesthesia Type- epidural with ASA Monitors.         Additional Monitors:     Airway Plan:            Plan Factors-    Chart reviewed.   Existing labs reviewed. Patient summary reviewed.                  Induction-     Postoperative Plan-     Perioperative Resuscitation Plan - Level 1 - Full Code.       Informed Consent- Anesthetic plan and risks discussed with patient.

## 2024-11-18 NOTE — H&P
H & P- Obstetrics   Anila Cheng 29 y.o. female MRN: [id number]  Unit/Bed#: -01 Encounter: [id number]    Assessment: 29 y.o.  at 38w6d admitted for induction of labor in the setting of A2GDM and chronic hypertension. Patient SROMed in the parking lot on her way in. Plan for pitocin vs expectant management.    Plan:   38 weeks gestation of pregnancy  Assessment & Plan  Admit to OBGYN   Clear liquid diet, IVF LR 125cc/hr   F/u T&S, CBC, RPR   GBS negative; EFW: 13% at 36w  SVE: 470/-3  Continuous fetal monitoring and tocometry   Analgesia at maternal request   Vertex by TAUS  Contraception plan: salpingectomy if c section, declines bridge  Plan: pitocin if needed    SROM and labor vs PROM  Assessment & Plan  Patient scheduled more morning induction  Rupture of membranes occurred in parking lot on her way in for clear fluid  Pooling noted, nitrazine positive    Request for sterilization  Assessment & Plan  Plan for bilateral salpingectomy if  section    Chronic hypertension affecting pregnancy  Assessment & Plan  Follow up admission CBC/CMP and p:c ratio    Anxiety state  Assessment & Plan  Continue home Lexapro    Insulin controlled gestational diabetes mellitus (GDM) in third trimester  Assessment & Plan    Lab Results   Component Value Date    HGBA1C 5.1 2024   Check blood glucose every 4 hours during cervical ripening, every 2 hours in latent labor, and every hour in active labor    Hypothyroidism during pregnancy, third trimester  Assessment & Plan  Continue home Levothyroxine        Discussed case and plan w/ Dr. Mendes      Chief Complaint: here for induction, leakage of fluid    HPI: Anlia Cheng is a 29 y.o.  with an AUSTYN of 2024, by Ultrasound at 38w6d who is being admitted for induction of labor. While in the parking lot, patient noted a huge gush of fluid and continued leakage since. She endorses contractions but is unsure how frequent they are. She  denies vaginal bleeding and decreased fetal movement.    Patient Active Problem List   Diagnosis    Hypothyroidism during pregnancy, third trimester    Bradycardia    Elevated brain natriuretic peptide (BNP) level    Insulin controlled gestational diabetes mellitus (GDM) in third trimester    Obesity affecting pregnancy in third trimester    38 weeks gestation of pregnancy    Anxiety state    History of gestational hypertension    Chronic hypertension affecting pregnancy    Request for sterilization    Obesity, class 3    Pregnancy headache in third trimester    Chest pain    Palpitations    Fetal heart rate/rhythm abnormality, antepartum    Irregular uterine contractions    Decreased fetal movement    SROM and labor vs PROM       Baby complications/comments: none    Review of Systems   Constitutional:  Negative for chills and fever.   HENT: Negative.     Respiratory:  Negative for cough and shortness of breath.    Cardiovascular:  Negative for chest pain.   Gastrointestinal:  Negative for abdominal pain, nausea and vomiting.   Genitourinary: Negative.  Negative for vaginal bleeding.   Musculoskeletal: Negative.    Neurological:  Negative for headaches.   Psychiatric/Behavioral: Negative.         OB Hx:  OB History    Para Term  AB Living   3 2 2   2   SAB IAB Ectopic Multiple Live Births      0 2      # Outcome Date GA Lbr Rohan/2nd Weight Sex Type Anes PTL Lv   3 Current            2 Term 21 38w4d / 00:23 2860 g (6 lb 4.9 oz) F Vag-Spont EPI N KALEE   1 Term 16 37w6d  3147 g (6 lb 15 oz) M Vag-Spont EPI N KALEE       Past Medical Hx:  Past Medical History:   Diagnosis Date    Chronic hypertension affecting pregnancy 2020    Disease of thyroid gland     Gestational diabetes mellitus (GDM) in third trimester 2021    Hypothyroid     Seasonal allergies     Urinary tract infection     Varicella        Past Surgical hx:  Past Surgical History:   Procedure Laterality Date    KNEE CARTILAGE  SURGERY Right     WISDOM TOOTH EXTRACTION      WRIST SURGERY Left        Social Hx:  Alcohol use: denies  Tobacco use: denies  Other substance use: denies    Allergies   Allergen Reactions    Pollen Extract Allergic Rhinitis         Medications Prior to Admission:     Blood Glucose Monitoring Suppl (OneTouch Verio Reflect) w/Device KIT    escitalopram (LEXAPRO) 10 mg tablet    Insulin Pen Needle (Pen Needles) 31G X 5 MM MISC    Lancets (OneTouch Delica Plus [id number]) MISC    Lantus SoloStar 100 units/mL SOPN    levothyroxine 112 mcg tablet    OneTouch Verio test strip    Prenatal Vit-DSS-Fe Fum-FA (Prenatal 19) tablet    Objective:     There is no height or weight on file to calculate BMI.     Physical Exam:  Physical Exam  HENT:      Head: Normocephalic and atraumatic.      Mouth/Throat:      Mouth: Mucous membranes are moist.   Cardiovascular:      Rate and Rhythm: Normal rate.      Pulses: Normal pulses.   Pulmonary:      Effort: Pulmonary effort is normal. No respiratory distress.      Breath sounds: Normal breath sounds.   Abdominal:      General: There is no distension.   Neurological:      General: No focal deficit present.      Mental Status: She is alert.   Skin:     General: Skin is warm and dry.   Psychiatric:         Mood and Affect: Mood normal.         Behavior: Behavior normal.   Vitals reviewed.            FHT:  Baseline Rate (FHR): 145 bpm  FHR Category: Category I  Moderate variability  15 x 15 accelerations present  No decelerations    TOCO:   Contraction Frequency (minutes): q6    Lab Results   Component Value Date    WBC 14.08 (H) 10/15/2024    HGB 11.4 (L) 10/15/2024    HCT 34.6 (L) 10/15/2024     10/15/2024     Lab Results   Component Value Date    K 3.9 10/15/2024     10/15/2024    CO2 25 10/15/2024    BUN 12 10/15/2024    CREATININE 0.58 (L) 10/15/2024    AST 14 10/15/2024    ALT 10 10/15/2024       Prenatal Labs: Reviewed      Blood type: AB positive  Antibody: negative  GBS:  negative  HIV: Non-reactive  Rubella: Immune  Syphilis IgM/IgG: Non-reactive  HBsAg: Non-reactive  HCAb: Non-reactive  Chlamydia: Negative  Gonorrhea: Negative  Diabetes 1 hour screen: 167  Platelets: 288k  Hgb: 11.4    >2 Midnights  INPATIENT     Signature/Title: Bessy Rao MD  Date: 11/18/2024  Time: 7:10 AM

## 2024-11-18 NOTE — ASSESSMENT & PLAN NOTE
Continue home Lexapro 10 mg   Pt meets criteria for severe malnutrition in the setting of acute/chronic illness.

## 2024-11-18 NOTE — TELEPHONE ENCOUNTER
----- Message from Gemma FRASER sent at 2024  9:25 AM EDT -----  Regarding: RE: schedule tubal; pregnant pt    ----- Message -----  From: Angelica Porter MD  Sent: 2024   5:16 PM EDT  To: Gemma Romano  Subject: schedule tubal; pregnant pt                      [facility] GYN Department  Surgery Scheduling Sheet    Patient currently pregnant, AUSTYN 24 with hx 2 prior   Wants to schedule tubal 6wk pp. May schedule 6wk post-AUSTYN    Please also indicate that her pp leave should be 8 weeks for  + surgical recovery     Patient Name: Anila Cheng  : 1995    Provider: Angelica Porter MD     Needed: no; Language: N/A    Procedure: exam under anesthesia and laparoscopic bilateral salpingectomy    Diagnosis: request for sterilization    Special Needs or Equipment: none    Anesthesia: General anesthesia    Length of stay: outpatient  Does patient have comorbid conditions that will require close perioperative monitoring prior to safe discharge: no    Pre-Admission Testing Needed: yes   Labs that should be ordered: cbc, type and screen, and urine pregnancy test    Order PAT that is recommended in prep for procedure?: Yes    Medical Clearance Needed: no; Provider: N/A    MA Form Signed (tubals/hysterectomy): Not Indicated    Surgical Drink Given: no     How many days out of work: 2 week(s)     How many days no drivin week(s)       Is pre op appt needed?  Yes, postpartum  Interval for post op appt: 2 week(s)     For Surgical Scheduler:     Surgery Scheduled On:  [place]    Pre-op Appt:   Post op Appt:  Consult/Medical clearance appt:

## 2024-11-18 NOTE — ASSESSMENT & PLAN NOTE
Patient scheduled more morning induction  Rupture of membranes occurred in parking lot on her way in for clear fluid  Pooling noted, nitrazine positive

## 2024-11-18 NOTE — ANESTHESIA PROCEDURE NOTES
"Epidural Block    Patient location during procedure: OB/L&D  Start time: 11/18/2024 9:06 AM  Reason for block: at surgeon's request and primary anesthetic  Staffing  Performed by: Dylan Medrano DO  Authorized by: Dylan Medrano DO    Preanesthetic Checklist  Completed: patient identified, IV checked, risks and benefits discussed, surgical consent, monitors and equipment checked, pre-op evaluation and timeout performed  Epidural  Patient position: sitting  Prep: Betadine and ChloraPrep  Sedation Level: no sedation  Patient monitoring: frequent blood pressure checks, continuous pulse oximetry and heart rate  Approach: midline  Location: lumbar, L3-4  Injection technique: LULA saline  Needle  Needle type: Tuohy   Needle gauge: 17 G  Needle insertion depth: 7 cm  Catheter type: multi-orifice  Catheter size: 19 G  Catheter at skin depth: 12 cm  Catheter securement method: stabilization device and clear occlusive dressing  Test dose: negativelidocaine-epinephrine (XYLOCAINE-MPF/EPINEPHRINE) 1.5 %-1:200,000 injection 3 mL - Epidural   3 mL - 11/18/2024 9:07:00 AM  Assessment  Sensory level: T10  Number of attempts: 1negative aspiration for CSF, negative aspiration for heme and no paresthesia on injection  patient tolerated the procedure well with no immediate complications  Additional Notes  Dural puncture epidural technique.  25g 5\" pencan spinal needle.  +CSF.  Spinal needle removed.          "

## 2024-11-19 VITALS
RESPIRATION RATE: 16 BRPM | TEMPERATURE: 98.1 F | HEIGHT: 64 IN | OXYGEN SATURATION: 98 % | DIASTOLIC BLOOD PRESSURE: 84 MMHG | SYSTOLIC BLOOD PRESSURE: 129 MMHG | WEIGHT: 244.8 LBS | BODY MASS INDEX: 41.79 KG/M2 | HEART RATE: 69 BPM

## 2024-11-19 PROCEDURE — 99024 POSTOP FOLLOW-UP VISIT: CPT | Performed by: OBSTETRICS & GYNECOLOGY

## 2024-11-19 RX ORDER — BENZOCAINE/MENTHOL 6 MG-10 MG
1 LOZENGE MUCOUS MEMBRANE DAILY PRN
Start: 2024-11-19

## 2024-11-19 RX ORDER — IBUPROFEN 600 MG/1
600 TABLET, FILM COATED ORAL EVERY 6 HOURS PRN
Qty: 30 TABLET | Refills: 0 | Status: SHIPPED | OUTPATIENT
Start: 2024-11-19 | End: 2024-11-22

## 2024-11-19 RX ORDER — ACETAMINOPHEN 325 MG/1
650 TABLET ORAL EVERY 6 HOURS PRN
Qty: 24 TABLET | Refills: 0 | Status: SHIPPED | OUTPATIENT
Start: 2024-11-19 | End: 2024-11-22

## 2024-11-19 RX ORDER — DOCUSATE SODIUM 100 MG/1
100 CAPSULE, LIQUID FILLED ORAL 2 TIMES DAILY
Qty: 60 CAPSULE | Refills: 0 | Status: SHIPPED | OUTPATIENT
Start: 2024-11-19

## 2024-11-19 RX ADMIN — ACETAMINOPHEN 650 MG: 325 TABLET, FILM COATED ORAL at 07:31

## 2024-11-19 RX ADMIN — LEVOTHYROXINE SODIUM 112 MCG: 112 TABLET ORAL at 06:12

## 2024-11-19 RX ADMIN — DOCUSATE SODIUM 100 MG: 100 CAPSULE, LIQUID FILLED ORAL at 09:01

## 2024-11-19 RX ADMIN — IBUPROFEN 600 MG: 600 TABLET, FILM COATED ORAL at 02:21

## 2024-11-19 RX ADMIN — ESCITALOPRAM OXALATE 10 MG: 10 TABLET ORAL at 09:01

## 2024-11-19 RX ADMIN — IBUPROFEN 600 MG: 600 TABLET, FILM COATED ORAL at 09:01

## 2024-11-19 NOTE — PLAN OF CARE

## 2024-11-19 NOTE — PROGRESS NOTES
"Progress Note - OB/GYN  Anila Cheng 29 y.o. female MRN: [id number]  Unit/Bed#:  410-01 Encounter: [id number]    Assessment and Plan     Anila Cheng is a patient of: [facility]. She is PPD# 1 s/p  spontaneous vaginal delivery  Recovering well and is stable       Request for sterilization  Assessment & Plan  Plan for bilateral salpingectomy if  section    Chronic hypertension affecting pregnancy  Assessment & Plan  Follow up admission CBC/CMP and p:c ratio unable to calculate  BP monitoring  Systolic (12hrs), Av , Min:111 , Max:160   Diastolic (12hrs), Av, Min:58, Max:82         Anxiety state  Assessment & Plan  Continue home Lexapro 10 mg    Insulin controlled gestational diabetes mellitus (GDM) in third trimester  Assessment & Plan  2hr GTT 6 weeks PP    Hypothyroidism during pregnancy, third trimester  Assessment & Plan  Continue home Levothyroxine 112mcg    *  (spontaneous vaginal delivery)  Assessment & Plan  Continue routine post partum care  Encourage ambulation  Encourage breastfeeding  Contraception: Undecided - Sterilization declines bridge  Anticipate discharge PPD 1 vs 2         Disposition    - Anticipate discharge home on PPD# 1      Subjective/Objective     Chief Complaint: Postpartum State     Subjective:    Anila Cheng is PPD#1 s/p  spontaneous vaginal delivery. She has no current complaints.  Pain is well controlled.  Patient is currently voiding.  She is ambulating.  Patient is currently passing flatus and has had no bowel movement. She is tolerating PO, and denies nausea or vomitting. She denies headaches, vision changes, RUQ tenderness.  Patient denies fever, chills, chest pain, shortness of breath, or calf tenderness. Lochia is normal. She is  Breastfeeding. She is recovering well and is stable.         Vitals:   /80 (BP Location: Left arm)   Pulse 65   Temp 97.9 °F (36.6 °C) (Oral)   Resp 18   Ht 5' 4\" (1.626 m)   Wt 111 kg " (244 lb 12.8 oz)   LMP 01/31/2024 (Approximate)   SpO2 98%   Breastfeeding Yes   BMI 42.02 kg/m²       Intake/Output Summary (Last 24 hours) at 11/19/2024 0638  Last data filed at 11/18/2024 1545  Gross per 24 hour   Intake --   Output 1375 ml   Net -1375 ml       Invasive Devices       None                   Physical Exam:   GEN: Anila Cheng appears well, alert and oriented x 3, pleasant and cooperative   CARDIO: RRR, no murmurs or rubs  RESP:  CTAB, no wheezes or rales  ABDOMEN: soft, no tenderness, no distention, fundus firm and non-tender @ umbilicus  EXTREMITIES: non tender, no erythema, b/l Navid's sign negative      Labs:     Hemoglobin   Date Value Ref Range Status   11/18/2024 11.6 11.5 - 15.4 g/dL Final   10/15/2024 11.4 (L) 11.5 - 15.4 g/dL Final   06/09/2016 11.3 (L) 11.7 - 15.5 g/dL Final     WBC   Date Value Ref Range Status   11/18/2024 14.40 (H) 4.31 - 10.16 Thousand/uL Final   10/15/2024 14.08 (H) 4.31 - 10.16 Thousand/uL Final   06/09/2016 12.3 (H) 3.8 - 10.8 Thousand/uL Final     Platelets   Date Value Ref Range Status   11/18/2024 272 149 - 390 Thousands/uL Final   10/15/2024 288 149 - 390 Thousands/uL Final   06/09/2016 317 140 - 400 Thousand/uL Final     Creatinine   Date Value Ref Range Status   11/18/2024 0.63 0.60 - 1.30 mg/dL Final     Comment:     Standardized to IDMS reference method   10/15/2024 0.58 (L) 0.60 - 1.30 mg/dL Final     Comment:     Standardized to IDMS reference method   12/14/2023 0.77 0.40 - 1.10 mg/dL Final   02/28/2022 0.92 0.40 - 1.10 mg/dL Final     AST   Date Value Ref Range Status   11/18/2024 18 13 - 39 U/L Final   10/15/2024 14 13 - 39 U/L Final   12/14/2023 15 <41 U/L Final   02/28/2022 10 <41 U/L Final     ALT   Date Value Ref Range Status   11/18/2024 12 7 - 52 U/L Final     Comment:     Specimen collection should occur prior to Sulfasalazine administration due to the potential for falsely depressed results.    10/15/2024 10 7 - 52 U/L Final      Comment:     Specimen collection should occur prior to Sulfasalazine administration due to the potential for falsely depressed results.    12/14/2023 12 <56 U/L Final   02/28/2022 15 <56 U/L Final          Albert Choi MD  11/19/2024  6:38 AM

## 2024-11-19 NOTE — PROGRESS NOTES
Patient and partner provided with discharge education packet. Discussed and provided patient with save your life magnet. Patient and partner provided with information on safe sleep practices. Patient and partner asked appropriate questions and verbalized understanding.

## 2024-11-19 NOTE — PLAN OF CARE
Problem: POSTPARTUM  Goal: Experiences normal postpartum course  Description: INTERVENTIONS:  - Monitor maternal vital signs  - Assess uterine involution and lochia  Outcome: Adequate for Discharge  Goal: Appropriate maternal -  bonding  Description: INTERVENTIONS:  - Identify family support  - Assess for appropriate maternal/infant bonding   -Encourage maternal/infant bonding opportunities  - Referral to  or  as needed  Outcome: Adequate for Discharge  Goal: Establishment of infant feeding pattern  Description: INTERVENTIONS:  - Assess breast/bottle feeding  - Refer to lactation as needed  Outcome: Adequate for Discharge  Goal: Incision(s), wounds(s) or drain site(s) healing without S/S of infection  Description: INTERVENTIONS  - Assess and document dressing, incision, wound bed, drain sites and surrounding tissue  - Provide patient and family education  Outcome: Adequate for Discharge     Problem: PAIN - ADULT  Goal: Verbalizes/displays adequate comfort level or baseline comfort level  Description: Interventions:  - Encourage patient to monitor pain and request assistance  - Assess pain using appropriate pain scale  - Administer analgesics based on type and severity of pain and evaluate response  - Implement non-pharmacological measures as appropriate and evaluate response  - Consider cultural and social influences on pain and pain management  - Notify physician/advanced practitioner if interventions unsuccessful or patient reports new pain  Outcome: Adequate for Discharge     Problem: INFECTION - ADULT  Goal: Absence or prevention of progression during hospitalization  Description: INTERVENTIONS:  - Assess and monitor for signs and symptoms of infection  - Monitor lab/diagnostic results  - Monitor all insertion sites, i.e. indwelling lines, tubes, and drains  - Monitor endotracheal if appropriate and nasal secretions for changes in amount and color  - [place] appropriate  cooling/warming therapies per order  - Administer medications as ordered  - Instruct and encourage patient and family to use good hand hygiene technique  - Identify and instruct in appropriate isolation precautions for identified infection/condition  Outcome: Adequate for Discharge  Goal: Absence of fever/infection during neutropenic period  Description: INTERVENTIONS:  - Monitor WBC    Outcome: Adequate for Discharge     Problem: SAFETY ADULT  Goal: Patient will remain free of falls  Description: INTERVENTIONS:  - Educate patient/family on patient safety including physical limitations  - Instruct patient to call for assistance with activity   - Consult OT/PT to assist with strengthening/mobility   - Keep Call bell within reach  - Keep bed low and locked with side rails adjusted as appropriate  - Keep care items and personal belongings within reach  - Initiate and maintain comfort rounds  - Make Fall Risk Sign visible to staff  - Apply yellow socks and bracelet for high fall risk patients  - Consider moving patient to room near nurses station  Outcome: Adequate for Discharge  Goal: Maintain or return to baseline ADL function  Description: INTERVENTIONS:  -  Assess patient's ability to carry out ADLs; assess patient's baseline for ADL function and identify physical deficits which impact ability to perform ADLs (bathing, care of mouth/teeth, toileting, grooming, dressing, etc.)  - Assess/evaluate cause of self-care deficits   - Assess range of motion  - Assess patient's mobility; develop plan if impaired  - Assess patient's need for assistive devices and provide as appropriate  - Encourage maximum independence but intervene and supervise when necessary  - Involve family in performance of ADLs  - Assess for home care needs following discharge   - Consider OT consult to assist with ADL evaluation and planning for discharge  - Provide patient education as appropriate  Outcome: Adequate for Discharge  Goal:  Maintains/Returns to pre admission functional level  Description: INTERVENTIONS:  - Perform AM-PAC 6 Click Basic Mobility/ Daily Activity assessment daily.  - Set and communicate daily mobility goal to care team and patient/family/caregiver.   - Collaborate with rehabilitation services on mobility goals if consulted  - Out of bed for toileting  - Record patient progress and toleration of activity level   Outcome: Adequate for Discharge     Problem: Knowledge Deficit  Goal: Patient/family/caregiver demonstrates understanding of disease process, treatment plan, medications, and discharge instructions  Description: Complete learning assessment and assess knowledge base.  Interventions:  - Provide teaching at level of understanding  - Provide teaching via preferred learning methods  Outcome: Adequate for Discharge     Problem: DISCHARGE PLANNING  Goal: Discharge to home or other facility with appropriate resources  Description: INTERVENTIONS:  - Identify barriers to discharge w/patient and caregiver  - Arrange for needed discharge resources and transportation as appropriate  - Identify discharge learning needs (meds, wound care, etc.)  - Arrange for interpretive services to assist at discharge as needed  - Refer to Case Management Department for coordinating discharge planning if the patient needs post-hospital services based on physician/advanced practitioner order or complex needs related to functional status, cognitive ability, or social support system  Outcome: Adequate for Discharge

## 2024-11-20 ENCOUNTER — NURSE TRIAGE (OUTPATIENT)
Age: 29
End: 2024-11-20

## 2024-11-20 ENCOUNTER — HOSPITAL ENCOUNTER (EMERGENCY)
Facility: HOSPITAL | Age: 29
Discharge: HOME | End: 2024-11-20
Attending: EMERGENCY MEDICINE
Payer: COMMERCIAL

## 2024-11-20 VITALS
HEART RATE: 64 BPM | DIASTOLIC BLOOD PRESSURE: 60 MMHG | TEMPERATURE: 99 F | SYSTOLIC BLOOD PRESSURE: 118 MMHG | WEIGHT: 240.3 LBS | BODY MASS INDEX: 41.25 KG/M2 | OXYGEN SATURATION: 98 % | RESPIRATION RATE: 21 BRPM

## 2024-11-20 DIAGNOSIS — R03.0 ELEVATED BP WITHOUT DIAGNOSIS OF HYPERTENSION: Primary | ICD-10-CM

## 2024-11-20 DIAGNOSIS — O10.919 CHRONIC HYPERTENSION AFFECTING PREGNANCY: ICD-10-CM

## 2024-11-20 LAB
ALBUMIN SERPL BCG-MCNC: 3.1 G/DL (ref 3.5–5)
ALP SERPL-CCNC: 74 U/L (ref 34–104)
ALT SERPL W P-5'-P-CCNC: 16 U/L (ref 7–52)
ANION GAP SERPL CALCULATED.3IONS-SCNC: 5 MMOL/L (ref 4–13)
AST SERPL W P-5'-P-CCNC: 31 U/L (ref 13–39)
ATRIAL RATE: 62 BPM
BACTERIA UR QL AUTO: ABNORMAL /HPF
BASOPHILS # BLD AUTO: 0.03 THOUSANDS/ÂΜL (ref 0–0.1)
BASOPHILS NFR BLD AUTO: 0 % (ref 0–1)
BILIRUB SERPL-MCNC: 0.21 MG/DL (ref 0.2–1)
BILIRUB UR QL STRIP: NEGATIVE
BUN SERPL-MCNC: 10 MG/DL (ref 5–25)
CALCIUM ALBUM COR SERPL-MCNC: 9.2 MG/DL (ref 8.3–10.1)
CALCIUM SERPL-MCNC: 8.5 MG/DL (ref 8.4–10.2)
CHLORIDE SERPL-SCNC: 103 MMOL/L (ref 96–108)
CLARITY UR: ABNORMAL
CO2 SERPL-SCNC: 28 MMOL/L (ref 21–32)
COLOR UR: COLORLESS
CREAT SERPL-MCNC: 0.69 MG/DL (ref 0.6–1.3)
CREAT UR-MCNC: 37.5 MG/DL
EOSINOPHIL # BLD AUTO: 0.25 THOUSAND/ÂΜL (ref 0–0.61)
EOSINOPHIL NFR BLD AUTO: 2 % (ref 0–6)
ERYTHROCYTE [DISTWIDTH] IN BLOOD BY AUTOMATED COUNT: 13.8 % (ref 11.6–15.1)
GFR SERPL CREATININE-BSD FRML MDRD: 118 ML/MIN/1.73SQ M
GLUCOSE SERPL-MCNC: 68 MG/DL (ref 65–140)
GLUCOSE UR STRIP-MCNC: NEGATIVE MG/DL
HCT VFR BLD AUTO: 35.7 % (ref 34.8–46.1)
HGB BLD-MCNC: 11.6 G/DL (ref 11.5–15.4)
HGB UR QL STRIP.AUTO: ABNORMAL
IMM GRANULOCYTES # BLD AUTO: 0.06 THOUSAND/UL (ref 0–0.2)
IMM GRANULOCYTES NFR BLD AUTO: 1 % (ref 0–2)
KETONES UR STRIP-MCNC: NEGATIVE MG/DL
LEUKOCYTE ESTERASE UR QL STRIP: ABNORMAL
LYMPHOCYTES # BLD AUTO: 0.89 THOUSANDS/ÂΜL (ref 0.6–4.47)
LYMPHOCYTES NFR BLD AUTO: 8 % (ref 14–44)
MCH RBC QN AUTO: 30 PG (ref 26.8–34.3)
MCHC RBC AUTO-ENTMCNC: 32.5 G/DL (ref 31.4–37.4)
MCV RBC AUTO: 92 FL (ref 82–98)
MONOCYTES # BLD AUTO: 0.71 THOUSAND/ÂΜL (ref 0.17–1.22)
MONOCYTES NFR BLD AUTO: 6 % (ref 4–12)
NEUTROPHILS # BLD AUTO: 9.99 THOUSANDS/ÂΜL (ref 1.85–7.62)
NEUTS SEG NFR BLD AUTO: 83 % (ref 43–75)
NITRITE UR QL STRIP: NEGATIVE
NON-SQ EPI CELLS URNS QL MICRO: ABNORMAL /HPF
NRBC BLD AUTO-RTO: 0 /100 WBCS
P AXIS: 62 DEGREES
PH UR STRIP.AUTO: 6 [PH]
PLATELET # BLD AUTO: 293 THOUSANDS/UL (ref 149–390)
PMV BLD AUTO: 9.5 FL (ref 8.9–12.7)
POTASSIUM SERPL-SCNC: 3.8 MMOL/L (ref 3.5–5.3)
PR INTERVAL: 186 MS
PROT SERPL-MCNC: 6.2 G/DL (ref 6.4–8.4)
PROT UR STRIP-MCNC: ABNORMAL MG/DL
PROT UR-MCNC: 56 MG/DL
PROT/CREAT UR: 1.5 MG/G{CREAT} (ref 0–0.1)
QRS AXIS: 84 DEGREES
QRSD INTERVAL: 80 MS
QT INTERVAL: 398 MS
QTC INTERVAL: 403 MS
RBC # BLD AUTO: 3.87 MILLION/UL (ref 3.81–5.12)
RBC #/AREA URNS AUTO: ABNORMAL /HPF
SODIUM SERPL-SCNC: 136 MMOL/L (ref 135–147)
SP GR UR STRIP.AUTO: 1 (ref 1–1.03)
T WAVE AXIS: 53 DEGREES
UROBILINOGEN UR STRIP-ACNC: <2 MG/DL
VENTRICULAR RATE: 62 BPM
WBC # BLD AUTO: 11.93 THOUSAND/UL (ref 4.31–10.16)
WBC #/AREA URNS AUTO: ABNORMAL /HPF
WBC CLUMPS # UR AUTO: PRESENT /UL

## 2024-11-20 PROCEDURE — 99285 EMERGENCY DEPT VISIT HI MDM: CPT | Performed by: EMERGENCY MEDICINE

## 2024-11-20 PROCEDURE — 99284 EMERGENCY DEPT VISIT MOD MDM: CPT

## 2024-11-20 PROCEDURE — 93010 ELECTROCARDIOGRAM REPORT: CPT | Performed by: INTERNAL MEDICINE

## 2024-11-20 PROCEDURE — 80053 COMPREHEN METABOLIC PANEL: CPT

## 2024-11-20 PROCEDURE — 93005 ELECTROCARDIOGRAM TRACING: CPT

## 2024-11-20 PROCEDURE — 99214 OFFICE O/P EST MOD 30 MIN: CPT | Performed by: OBSTETRICS & GYNECOLOGY

## 2024-11-20 PROCEDURE — 82570 ASSAY OF URINE CREATININE: CPT

## 2024-11-20 PROCEDURE — 85025 COMPLETE CBC W/AUTO DIFF WBC: CPT

## 2024-11-20 PROCEDURE — 84156 ASSAY OF PROTEIN URINE: CPT

## 2024-11-20 PROCEDURE — 81001 URINALYSIS AUTO W/SCOPE: CPT

## 2024-11-20 PROCEDURE — 36415 COLL VENOUS BLD VENIPUNCTURE: CPT

## 2024-11-20 RX ORDER — NIFEDIPINE 30 MG/1
30 TABLET, EXTENDED RELEASE ORAL DAILY
Qty: 30 TABLET | Refills: 1 | Status: SHIPPED | OUTPATIENT
Start: 2024-11-20

## 2024-11-20 RX ORDER — NIFEDIPINE 30 MG/1
30 TABLET, EXTENDED RELEASE ORAL ONCE
Status: COMPLETED | OUTPATIENT
Start: 2024-11-20 | End: 2024-11-20

## 2024-11-20 RX ADMIN — NIFEDIPINE 30 MG: 30 TABLET, FILM COATED, EXTENDED RELEASE ORAL at 15:58

## 2024-11-20 NOTE — TELEPHONE ENCOUNTER
Reason for Disposition  • New hand or face swelling    Protocols used: Postpartum - High Blood Pressure-Adult-AH

## 2024-11-20 NOTE — ED ATTENDING ATTESTATION
2024  I, Shayla Yu DO, saw and evaluated the patient. I have discussed the patient with the resident/non-physician practitioner and agree with the resident's/non-physician practitioner's findings, Plan of Care, and MDM as documented in the resident's/non-physician practitioner's note, except where noted. All available labs and Radiology studies were reviewed.  I was present for key portions of any procedure(s) performed by the resident/non-physician practitioner and I was immediately available to provide assistance.       At this point I agree with the current assessment done in the Emergency Department.  I have conducted an independent evaluation of this patient a history and physical is as follows:    30 yo F h/o chronic HTN, postpartum day # 2 s/p  presenting for evaluation of b/l hand/feet swelling.  Pt reports noticing some swelling in b/l ankles/feet and fingers and this prompted her to check her BP and this was elevated, prompting her to come to the ER. She has h/o postpartum preeclampsia with prior pregnancy. No issues with BP this pregnancy and no antihypertensives. She reports otherwise feeling great.     Denies HA, vision changes, numbness, weakness, CP, SOB, abdominal pain, N/V, urinary sx    MDM: 30 yo F with elevated BP reading postpartum- will closely monitor BP, CBC/CMP/UA/prot creat ratio, discuss with OB/GYN      ED Course         Critical Care Time  Procedures

## 2024-11-20 NOTE — TELEPHONE ENCOUNTER
Regarding: hands and feet swollen  dd 11/19/24  ----- Message from Margarita LORA sent at 11/20/2024 12:20 PM EST -----  DD 11/19/24  Patients hands and feet swollen  bp 138/95 and 153/89  [facility]

## 2024-11-20 NOTE — DISCHARGE INSTRUCTIONS
Please follow-up outpatient with OB/GYN as previously scheduled.  You are being started on a antihypertensive medication please take it once a day as it is a 24-hour medication.  If you develop any dizziness lightheadedness or feeling that you are going to pass out please call your primary care physician or OB/GYN.  If your blood pressures are extremely low do not take your medication this would be the top number under 100.

## 2024-11-20 NOTE — ASSESSMENT & PLAN NOTE
28 y/o  with chronic HTN not on medications presenting for hand/leg swelling on postpartum day #2    -Denies other symptoms/signs  -First BP in /75, now normotensive  - CBC, CMP wnl; urine p/c ratio elevated, but with blood, not a straight cath    Plan:  -Start on Procardia XL 30mg daily, first dose to be given here  - Reassured patient that swelling is common after delivery

## 2024-11-20 NOTE — TELEPHONE ENCOUNTER
"Pt delivered on 24 via . Noted today significant bilateral leg and hand swelling which prompted her to take blood pressures. BP readings as follow: 138/95, and 153/89 which were taken about 10 minutes prior to call. Denies swelling in face, headache, vision changes, URQ pain. Patient did not have pre-e in this pregnancy, however, had postpartum pre-e with previous pregnancy requiring admission to ICU. RN advised pt will likely need to go to ER or LD, but provider may be OK with being seen in the office today. Can double check. Pt would like to go to ER due to waiting too long after last delivery and having to be admitted to ICU. RN agreeable. Advised will notify provider on call and advise of above. Will call patient with recommendations to go to ER or LD, but advised go to [facility] regardless. Pt agreeable to plan. No further questions.     ESC sent to provider on call, Dr. Moore.     ADDENDUM: Per provider on call, would like pt to go to ER. RN notified patient. No further questions.    Answer Assessment - Initial Assessment Questions  1. BLOOD PRESSURE: \"What is your blood pressure?\" \"Did you take at least two measurements 5 minutes apart?\"      138/95      153/89  2. ONSET: \"When did you take your blood pressure?\"      10mins  3. HOW: \"How did you take your blood pressure?\" (e.g., automatic home BP monitor, visiting nurse)      Home monitor  4. HISTORY: \"Do you have a history of high blood pressure?\" \"Were you diagnosed with preeclampsia during this or previous pregnancies?\"      Hx Pre-E PP  5. MEDICINES: \"Are you taking any medicines for blood pressure?\" \"Have you missed any doses recently?\"      Denies  6. DELIVERY: \"When was your delivery date?\" \"Vaginal delivery or ?\" \"How many babies?\"  (e.g., single baby, twins)      24,   7. OTHER SYMPTOMS: \"Do you have any other symptoms?\" (e.g., abdomen pain, chest pain, difficulty breathing, hand or face swelling, headache, vision " changes)      Feet swelling extending to knees; hands.    Protocols used: Postpartum - High Blood Pressure-Adult-AH

## 2024-11-20 NOTE — ED PROVIDER NOTES
Time reflects when diagnosis was documented in both MDM as applicable and the Disposition within this note       Time User Action Codes Description Comment    11/20/2024  2:50 PM Cheko Coppola Add [R03.0] Elevated BP without diagnosis of hypertension     11/20/2024  3:48 PM Aretha Phipps Add [O10.919] Chronic hypertension affecting pregnancy           ED Disposition       ED Disposition   Discharge    Condition   Stable    Date/Time   Wed Nov 20, 2024  3:52 PM    Comment   Anila Cheng discharge to home/self care.                   Assessment & Plan       Medical Decision Making  Concern for preeclampsia versus severe preeclampsia versus venous stasis versus hypertension.  Will obtain CBC, CMP urine protein creatinine ratio as well as UA.    OB/GYN consulted and notified them that she did give birth 2 days ago I have concern for preeclampsia.    Patient currently improve blood pressure 125 systolic, OB/GYN starting on her on Procardia and states that she is okay for discharge we will follow-up with them.  Patient given strict return follow-up cautions.      Amount and/or Complexity of Data Reviewed  External Data Reviewed: labs and notes.  Labs: ordered. Decision-making details documented in ED Course.  ECG/medicine tests:  Decision-making details documented in ED Course.        ED Course as of 11/20/24 1603   Wed Nov 20, 2024   1441 WBC(!): 11.93   1446 ECG 12 lead  Compared to October 15, 2024, sinus rhythm, normal axis normal intervals normal EKG.   1503 AST: 31   1503 ALT: 16   1503 ALK PHOS: 74   1503 Platelet Count: 293   1509 Prot/Creat Ratio, Ur(!): 1.5   1510 WBC, UA(!): Innumerable   1510 Bacteria, UA: Occasional   1510 Leukocytes, UA(!): Large       Medications   NIFEdipine (PROCARDIA XL) 24 hr tablet 30 mg (30 mg Oral Given 11/20/24 1558)       ED Risk Strat Scores                           SBIRT 20yo+      Flowsheet Row Most Recent Value   Initial Alcohol Screen: US AUDIT-C     1. How often do  you have a drink containing alcohol? 0 Filed at: 2024 1503   2. How many drinks containing alcohol do you have on a typical day you are drinking?  0 Filed at: 2024 1503   3a. Male UNDER 65: How often do you have five or more drinks on one occasion? 0 Filed at: 2024 1503   3b. FEMALE Any Age, or MALE 65+: How often do you have 4 or more drinks on one occassion? 0 Filed at: 2024 1503   Audit-C Score 0 Filed at: 2024 1503   LANG: How many times in the past year have you...    Used an illegal drug or used a prescription medication for non-medical reasons? Never Filed at: 2024 1503                            History of Present Illness       Chief Complaint   Patient presents with    Hypertension     Pt reports tracking BP at home due to hx of pre-eclampsia.  Pt also reports bilateral ankle swelling.   2 days ago.        Past Medical History:   Diagnosis Date    Chronic hypertension affecting pregnancy 2020    Disease of thyroid gland     Gestational diabetes mellitus (GDM) in third trimester 2021    Hypothyroid     Seasonal allergies     Urinary tract infection     Varicella       Past Surgical History:   Procedure Laterality Date    KNEE CARTILAGE SURGERY Right     WISDOM TOOTH EXTRACTION      WRIST SURGERY Left       Family History   Problem Relation Age of Onset    No Known Problems Mother     Hyperthyroidism Father     Pancreatic cancer Maternal Grandmother     Heart attack Maternal Grandfather     No Known Problems Paternal Grandmother     Kidney failure Paternal Grandfather     Diabetes Paternal Grandfather     Breast cancer Neg Hx     Colon cancer Neg Hx     Ovarian cancer Neg Hx       Social History     Tobacco Use    Smoking status: Former     Current packs/day: 0.00     Average packs/day: 1 pack/day for 4.0 years (4.0 ttl pk-yrs)     Types: Cigarettes     Start date: 2012     Quit date: 2016     Years since quittin.8    Smokeless tobacco: Never    Vaping Use    Vaping status: Former    Quit date: 1/1/2016   Substance Use Topics    Alcohol use: Not Currently     Comment: rarely prior to pregnancy    Drug use: Never      E-Cigarette/Vaping    E-Cigarette Use Former User     Quit Date 1/1/16       E-Cigarette/Vaping Substances    Nicotine No     THC No     CBD No     Flavoring No     Other No     Unknown No       I have reviewed and agree with the history as documented.     20-year-old female past medical history of spontaneous vaginal delivery 2 days ago, hypothyroidism, gestational diabetes, preeclampsia coming in with concerns of leg and hand swelling in addition to elevated blood pressure readings at home.  Patient called the nurse line and they told her to come to the ED here L&D as her blood pressure at home was elevated.  She states that she was previously hospitalized after her last pregnancy because she had a heart block and required magnesium requiring an ICU stay.  Patient is concerned but states that she does not feel the same when she did that and she feels much better and is just complaining of swelling in her hands and feet.  She also had a blood pressure reading in the 150s systolic at home.  She states right now she has no headache, dizziness, chest pain, shortness of breath, nausea, vomiting, diarrhea, abdominal pain.  Denies any palpitations denies any fever chills recent travel and unilateral leg swelling immobilization.        Review of Systems        Objective       ED Triage Vitals [11/20/24 1336]   Temperature Pulse Blood Pressure Respirations SpO2 Patient Position - Orthostatic VS   99 °F (37.2 °C) 68 158/75 20 100 % Sitting      Temp Source Heart Rate Source BP Location FiO2 (%) Pain Score    Oral Monitor Right arm -- --      Vitals      Date and Time Temp Pulse SpO2 Resp BP Pain Score FACES Pain Rating User   11/20/24 1530 -- 64 98 % 21 118/60 -- --    11/20/24 1515 -- 60 99 % 20 125/63 -- --    11/20/24 1508 -- -- -- -- 125/63  -- -- KAH   11/20/24 1500 -- 60 Simultaneous filing. User may not have seen previous data. 100 % Simultaneous filing. User may not have seen previous data. 17 138/65 Simultaneous filing. User may not have seen previous data. -- -- BA   11/20/24 1336 99 °F (37.2 °C) 68 100 % 20 158/75 -- --             Physical Exam  Vitals and nursing note reviewed.   Constitutional:       Appearance: Normal appearance. She is well-developed.   HENT:      Head: Normocephalic and atraumatic.      Nose: Nose normal.      Mouth/Throat:      Mouth: Mucous membranes are moist.      Pharynx: No oropharyngeal exudate or posterior oropharyngeal erythema.   Eyes:      Extraocular Movements: Extraocular movements intact.      Conjunctiva/sclera: Conjunctivae normal.      Pupils: Pupils are equal, round, and reactive to light.   Cardiovascular:      Rate and Rhythm: Normal rate and regular rhythm.      Pulses: Normal pulses.      Heart sounds: Normal heart sounds.   Pulmonary:      Effort: Pulmonary effort is normal.      Breath sounds: Normal breath sounds.   Chest:      Chest wall: No tenderness.   Abdominal:      General: Bowel sounds are normal. There is no distension.      Palpations: Abdomen is soft.      Tenderness: There is no abdominal tenderness. There is no guarding or rebound.   Musculoskeletal:         General: No swelling, deformity or signs of injury. Normal range of motion.      Cervical back: Normal range of motion and neck supple.      Right lower leg: No edema.      Left lower leg: No edema.   Skin:     General: Skin is warm and dry.      Capillary Refill: Capillary refill takes less than 2 seconds.      Coloration: Skin is not pale.      Findings: No rash.   Neurological:      General: No focal deficit present.      Mental Status: She is alert and oriented to person, place, and time.      Cranial Nerves: No cranial nerve deficit.   Psychiatric:         Mood and Affect: Mood normal.         Behavior: Behavior normal.          Results Reviewed       Procedure Component Value Units Date/Time    Urinalysis with microscopic [[id number]]  (Abnormal) Collected: 11/20/24 1434    Lab Status: Final result Specimen: Urine, Clean Catch Updated: 11/20/24 1509     Color, UA Colorless     Clarity, UA Turbid     Specific Gravity, UA 1.005     pH, UA 6.0     Leukocytes, UA Large     Nitrite, UA Negative     Protein, UA 50 (1+) mg/dl      Glucose, UA Negative mg/dl      Ketones, UA Negative mg/dl      Urobilinogen, UA <2.0 mg/dl      Bilirubin, UA Negative     Occult Blood, UA Large     RBC, UA Innumerable /hpf      WBC, UA Innumerable /hpf      Epithelial Cells Moderate /hpf      Bacteria, UA Occasional /hpf      WBC Clumps Present    Protein / creatinine ratio, urine [[id number]]  (Abnormal) Collected: 11/20/24 1434    Lab Status: Final result Specimen: Urine Updated: 11/20/24 1506     Creatinine, Ur 37.5 mg/dL      Protein Urine Random 56.0 mg/dL      Prot/Creat Ratio, Ur 1.5    Comprehensive metabolic panel [[id number]]  (Abnormal) Collected: 11/20/24 1432    Lab Status: Final result Specimen: Blood from Arm, Left Updated: 11/20/24 1457     Sodium 136 mmol/L      Potassium 3.8 mmol/L      Chloride 103 mmol/L      CO2 28 mmol/L      ANION GAP 5 mmol/L      BUN 10 mg/dL      Creatinine 0.69 mg/dL      Glucose 68 mg/dL      Calcium 8.5 mg/dL      Corrected Calcium 9.2 mg/dL      AST 31 U/L      ALT 16 U/L      Alkaline Phosphatase 74 U/L      Total Protein 6.2 g/dL      Albumin 3.1 g/dL      Total Bilirubin 0.21 mg/dL      eGFR 118 ml/min/1.73sq m     Narrative:      National Kid[employer] Foundation guidelines for Chronic Kidney Disease (CKD):     Stage 1 with normal or high GFR (GFR > 90 mL/min/1.73 square meters)    Stage 2 Mild CKD (GFR = 60-89 mL/min/1.73 square meters)    Stage 3A Moderate CKD (GFR = 45-59 mL/min/1.73 square meters)    Stage 3B Moderate CKD (GFR = 30-44 mL/min/1.73 square meters)    Stage 4 Severe CKD (GFR = 15-29  mL/min/1.73 square meters)    Stage 5 End Stage CKD (GFR <15 mL/min/1.73 square meters)  Note: GFR calculation is accurate only with a steady state creatinine    CBC and differential [[id number]]  (Abnormal) Collected: 11/20/24 1432    Lab Status: Final result Specimen: Blood from Arm, Left Updated: 11/20/24 1440     WBC 11.93 Thousand/uL      RBC 3.87 Million/uL      Hemoglobin 11.6 g/dL      Hematocrit 35.7 %      MCV 92 fL      MCH 30.0 pg      MCHC 32.5 g/dL      RDW 13.8 %      MPV 9.5 fL      Platelets 293 Thousands/uL      nRBC 0 /100 WBCs      Segmented % 83 %      Immature Grans % 1 %      Lymphocytes % 8 %      Monocytes % 6 %      Eosinophils Relative 2 %      Basophils Relative 0 %      Absolute Neutrophils 9.99 Thousands/µL      Absolute Immature Grans 0.06 Thousand/uL      Absolute Lymphocytes 0.89 Thousands/µL      Absolute Monocytes 0.71 Thousand/µL      Eosinophils Absolute 0.25 Thousand/µL      Basophils Absolute 0.03 Thousands/µL             No orders to display       Procedures    ED Medication and Procedure Management   Prior to Admission Medications   Prescriptions Last Dose Informant Patient Reported? Taking?   Blood Glucose Monitoring Suppl (OneTouch Verio Reflect) w/Device KIT   Yes No   Sig: use as directed to TEST BLOOD SUGAR   Insulin Pen Needle (Pen Needles) 31G X 5 MM MISC   No No   Sig: Use 1 daily   Lancets (OneTouch Delica Plus [id number]) MISC   No No   Sig: Use 4 a day. GDM.   OneTouch Verio test strip   No No   Sig: Test 4 times a day. GDM.   Prenatal Vit-DSS-Fe Fum-FA (Prenatal 19) tablet   Yes No   Sig: Take 1 tablet by mouth daily   acetaminophen (TYLENOL) 325 mg tablet   No No   Sig: Take 2 tablets (650 mg total) by mouth every 6 (six) hours as needed for mild pain or moderate pain for up to 3 days   benzocaine-menthol-lanolin-aloe (DERMOPLAST) 20-0.5 % topical spray   No No   Sig: Apply 1 Application topically every 6 (six) hours as needed for irritation or mild pain   docusate  sodium (COLACE) 100 mg capsule   No No   Sig: Take 1 capsule (100 mg total) by mouth 2 (two) times a day   escitalopram (LEXAPRO) 10 mg tablet   Yes No   Sig: Take 10 mg by mouth daily   hydrocortisone 1 % cream   No No   Sig: Apply 1 Application topically daily as needed for rash or irritation   ibuprofen (MOTRIN) 600 mg tablet   No No   Sig: Take 1 tablet (600 mg total) by mouth every 6 (six) hours as needed for mild pain for up to 3 days   levothyroxine 112 mcg tablet   No No   Sig: take 1 tablet by mouth daily   witch hazel-glycerin (TUCKS) topical pad   No No   Sig: Apply 1 Pad topically every 4 (four) hours as needed for irritation      Facility-Administered Medications: None     Current Discharge Medication List        START taking these medications    Details   NIFEdipine (PROCARDIA XL) 30 mg 24 hr tablet Take 1 tablet (30 mg total) by mouth daily  Qty: 30 tablet, Refills: 1    Associated Diagnoses: Chronic hypertension affecting pregnancy           CONTINUE these medications which have NOT CHANGED    Details   acetaminophen (TYLENOL) 325 mg tablet Take 2 tablets (650 mg total) by mouth every 6 (six) hours as needed for mild pain or moderate pain for up to 3 days  Qty: 24 tablet, Refills: 0    Associated Diagnoses:  (spontaneous vaginal delivery)      benzocaine-menthol-lanolin-aloe (DERMOPLAST) 20-0.5 % topical spray Apply 1 Application topically every 6 (six) hours as needed for irritation or mild pain  Qty: 56 g, Refills: 0    Associated Diagnoses:  (spontaneous vaginal delivery)      Blood Glucose Monitoring Suppl (OneTouch Verio Reflect) w/Device KIT use as directed to TEST BLOOD SUGAR      docusate sodium (COLACE) 100 mg capsule Take 1 capsule (100 mg total) by mouth 2 (two) times a day  Qty: 60 capsule, Refills: 0    Associated Diagnoses:  (spontaneous vaginal delivery)      escitalopram (LEXAPRO) 10 mg tablet Take 10 mg by mouth daily      hydrocortisone 1 % cream Apply 1 Application  topically daily as needed for rash or irritation    Associated Diagnoses:  (spontaneous vaginal delivery)      ibuprofen (MOTRIN) 600 mg tablet Take 1 tablet (600 mg total) by mouth every 6 (six) hours as needed for mild pain for up to 3 days  Qty: 30 tablet, Refills: 0    Associated Diagnoses:  (spontaneous vaginal delivery)      Insulin Pen Needle (Pen Needles) 31G X 5 MM MISC Use 1 daily  Qty: 100 each, Refills: 3    Comments: Insulin controlled gestational daibetes  Associated Diagnoses: Insulin controlled gestational diabetes mellitus (GDM) in third trimester      Lancets (OneTouch Delica Plus [id number]) MISC Use 4 a day. GDM.  Qty: 100 each, Refills: 4    Associated Diagnoses: 22 weeks gestation of pregnancy; Diet controlled gestational diabetes mellitus (GDM) in second trimester; Hypothyroid in pregnancy, antepartum, second trimester; Obesity affecting pregnancy in second trimester, unspecified obesity type      levothyroxine 112 mcg tablet take 1 tablet by mouth daily  Qty: 30 tablet, Refills: 5    Associated Diagnoses: Acquired hypothyroidism      OneTouch Verio test strip Test 4 times a day. GDM.  Qty: 100 strip, Refills: 4    Associated Diagnoses: 22 weeks gestation of pregnancy; Diet controlled gestational diabetes mellitus (GDM) in second trimester; Hypothyroid in pregnancy, antepartum, second trimester; Obesity affecting pregnancy in second trimester, unspecified obesity type      Prenatal Vit-DSS-Fe Fum-FA (Prenatal 19) tablet Take 1 tablet by mouth daily      witch hazel-glycerin (TUCKS) topical pad Apply 1 Pad topically every 4 (four) hours as needed for irritation  Qty: 50 each, Refills: 0    Associated Diagnoses:  (spontaneous vaginal delivery)           No discharge procedures on file.  ED SEPSIS DOCUMENTATION   Time reflects when diagnosis was documented in both MDM as applicable and the Disposition within this note       Time User Action Codes Description Comment    2024  2:50 PM  Cheko Coppola [R03.0] Elevated BP without diagnosis of hypertension     11/20/2024  3:48 PM Aretha Phipps [O10.919] Chronic hypertension affecting pregnancy                  Cheko Coppola MD  11/20/24 1607

## 2024-11-21 ENCOUNTER — CLINICAL SUPPORT (OUTPATIENT)
Dept: OBGYN CLINIC | Facility: CLINIC | Age: 29
End: 2024-11-21

## 2024-11-21 DIAGNOSIS — Z01.30 BP CHECK: Primary | ICD-10-CM

## 2024-11-21 PROCEDURE — NURSE

## 2024-11-21 NOTE — PROGRESS NOTES
Pt presented for postpartum BP check, Bp is 116/80.  Pt stated she's feeling no negative symptoms at this time.  Pt stated electric bp cuff is reading high, pt state cuff may be too small so she stated she ordered a new on. Pt will follow up next week for BP check with new bp cuff.

## 2024-11-26 LAB — PLACENTA IN STORAGE: NORMAL

## 2024-11-27 ENCOUNTER — TELEPHONE (OUTPATIENT)
Dept: OBGYN CLINIC | Facility: MEDICAL CENTER | Age: 29
End: 2024-11-27

## 2024-11-27 NOTE — TELEPHONE ENCOUNTER
OB nurse navigator attempted to get in contact with patient for post partum assessment call but unable to complete call at this time. Patient unavailable. Left message on vm to return call. [employer]t message sent.   intermittent iron infusions  -HGB is 9.6 on admission, and 8.4 the next day     Morbid obesity Body mass index is 83.12 kg/m². -recommend referral to bariatrics outpatient if not soila done     Chronic hypoxic and hypercapnic respiratory failure  -on 4 L oxygen at home - continue  -she sees Dr. Dana Vaca     ANMOL - on BiPAP at home and uses it - Continue BiPAP HS    COPD - continue Duonebs     Chronic BLE edema - hold Lasix, continue K  -11/26 - monitor for edema, and restart Lasix as needed     HTN - continue Coreg     DMII for about 10 years  -she has an appt to see Dr. Kelsey Rogers as new patient  -continue home meds Humalog 35 units TID and Tresiba (insulin degludec) 45 units HS  -hold home med metformin  -11/26-blood sugar at goal all day has been less than 180, continue insulin     Tob use - quit in 2004     On gabapentin 600 mg TID for neuropathy apparently - continue     Dyslipidemia - continue Zocor     On DAPT - continue aspirin and Plavix - was discharged from [facility] on DAPT in November 2017 and apparently still on DAPT - defer to her outpatient cardiologist     History of DVT in Nov 2017, provoked by hospitalization for TAVR - she did not require AC per Chest guidelines at dc in Nov 2017 per OSH records as DVT quickly resolved prior to dc. Continue Lovenox PPX     Mood disorder - continue vilazodone (Viibryd)     On Mirapex apparently for RLS - continue. Cultures growing Morganella sensitive to Rocephin. Antibiotic recommendations as per ID tomorrow. Possibly will DC home tomorrow. Subjective:     Patient says she is feeling better. However she complains of mild shortness of breath upon sitting   but not while laying down. Denied any chest pain. No acute overnight events    Objective:        Intake/Output Summary (Last 24 hours) at 11/29/18 1710  Last data filed at 11/29/18 1230   Gross per 24 hour   Intake              540 ml   Output                0 ml   Net              540 ml

## 2024-11-28 ENCOUNTER — APPOINTMENT (EMERGENCY)
Dept: ULTRASOUND IMAGING | Facility: HOSPITAL | Age: 29
End: 2024-11-28
Payer: COMMERCIAL

## 2024-11-28 ENCOUNTER — HOSPITAL ENCOUNTER (EMERGENCY)
Facility: HOSPITAL | Age: 29
Discharge: HOME | End: 2024-11-28
Attending: EMERGENCY MEDICINE
Payer: COMMERCIAL

## 2024-11-28 VITALS
RESPIRATION RATE: 18 BRPM | WEIGHT: 240 LBS | SYSTOLIC BLOOD PRESSURE: 144 MMHG | TEMPERATURE: 98.2 F | OXYGEN SATURATION: 99 % | BODY MASS INDEX: 40.97 KG/M2 | HEIGHT: 64 IN | DIASTOLIC BLOOD PRESSURE: 78 MMHG | HEART RATE: 82 BPM

## 2024-11-28 DIAGNOSIS — N93.9 VAGINAL BLEEDING: Primary | ICD-10-CM

## 2024-11-28 LAB
ALBUMIN SERPL BCG-MCNC: 3.6 G/DL (ref 3.5–5)
ALP SERPL-CCNC: 80 U/L (ref 34–104)
ALT SERPL W P-5'-P-CCNC: 20 U/L (ref 7–52)
ANION GAP SERPL CALCULATED.3IONS-SCNC: 8 MMOL/L (ref 4–13)
APTT PPP: 26 SECONDS (ref 23–34)
AST SERPL W P-5'-P-CCNC: 22 U/L (ref 13–39)
BASOPHILS # BLD AUTO: 0.05 THOUSANDS/ΜL (ref 0–0.1)
BASOPHILS NFR BLD AUTO: 1 % (ref 0–1)
BILIRUB SERPL-MCNC: 0.3 MG/DL (ref 0.2–1)
BUN SERPL-MCNC: 10 MG/DL (ref 5–25)
CALCIUM SERPL-MCNC: 8 MG/DL (ref 8.4–10.2)
CHLORIDE SERPL-SCNC: 103 MMOL/L (ref 96–108)
CO2 SERPL-SCNC: 23 MMOL/L (ref 21–32)
CREAT SERPL-MCNC: 0.8 MG/DL (ref 0.6–1.3)
EOSINOPHIL # BLD AUTO: 0.1 THOUSAND/ΜL (ref 0–0.61)
EOSINOPHIL NFR BLD AUTO: 1 % (ref 0–6)
ERYTHROCYTE [DISTWIDTH] IN BLOOD BY AUTOMATED COUNT: 13.1 % (ref 11.6–15.1)
GFR SERPL CREATININE-BSD FRML MDRD: 99 ML/MIN/1.73SQ M
GLUCOSE SERPL-MCNC: 84 MG/DL (ref 65–140)
HCT VFR BLD AUTO: 38.3 % (ref 34.8–46.1)
HGB BLD-MCNC: 12.4 G/DL (ref 11.5–15.4)
IMM GRANULOCYTES # BLD AUTO: 0.03 THOUSAND/UL (ref 0–0.2)
IMM GRANULOCYTES NFR BLD AUTO: 0 % (ref 0–2)
INR PPP: 1 (ref 0.85–1.19)
LYMPHOCYTES # BLD AUTO: 1.16 THOUSANDS/ΜL (ref 0.6–4.47)
LYMPHOCYTES NFR BLD AUTO: 11 % (ref 14–44)
MCH RBC QN AUTO: 30.2 PG (ref 26.8–34.3)
MCHC RBC AUTO-ENTMCNC: 32.4 G/DL (ref 31.4–37.4)
MCV RBC AUTO: 93 FL (ref 82–98)
MONOCYTES # BLD AUTO: 0.71 THOUSAND/ΜL (ref 0.17–1.22)
MONOCYTES NFR BLD AUTO: 7 % (ref 4–12)
NEUTROPHILS # BLD AUTO: 8.16 THOUSANDS/ΜL (ref 1.85–7.62)
NEUTS SEG NFR BLD AUTO: 80 % (ref 43–75)
NRBC BLD AUTO-RTO: 0 /100 WBCS
PLATELET # BLD AUTO: 383 THOUSANDS/UL (ref 149–390)
PMV BLD AUTO: 9 FL (ref 8.9–12.7)
POTASSIUM SERPL-SCNC: 3.9 MMOL/L (ref 3.5–5.3)
PROT SERPL-MCNC: 6.8 G/DL (ref 6.4–8.4)
PROTHROMBIN TIME: 13.7 SECONDS (ref 12.3–15)
RBC # BLD AUTO: 4.1 MILLION/UL (ref 3.81–5.12)
SODIUM SERPL-SCNC: 134 MMOL/L (ref 135–147)
TSH SERPL DL<=0.05 MIU/L-ACNC: 1.55 UIU/ML (ref 0.45–4.5)
WBC # BLD AUTO: 10.21 THOUSAND/UL (ref 4.31–10.16)

## 2024-11-28 PROCEDURE — 99284 EMERGENCY DEPT VISIT MOD MDM: CPT | Performed by: EMERGENCY MEDICINE

## 2024-11-28 PROCEDURE — 80053 COMPREHEN METABOLIC PANEL: CPT | Performed by: EMERGENCY MEDICINE

## 2024-11-28 PROCEDURE — 85730 THROMBOPLASTIN TIME PARTIAL: CPT | Performed by: EMERGENCY MEDICINE

## 2024-11-28 PROCEDURE — 99284 EMERGENCY DEPT VISIT MOD MDM: CPT

## 2024-11-28 PROCEDURE — 84443 ASSAY THYROID STIM HORMONE: CPT | Performed by: EMERGENCY MEDICINE

## 2024-11-28 PROCEDURE — 76856 US EXAM PELVIC COMPLETE: CPT

## 2024-11-28 PROCEDURE — 85025 COMPLETE CBC W/AUTO DIFF WBC: CPT | Performed by: EMERGENCY MEDICINE

## 2024-11-28 PROCEDURE — 36415 COLL VENOUS BLD VENIPUNCTURE: CPT | Performed by: EMERGENCY MEDICINE

## 2024-11-28 PROCEDURE — 85610 PROTHROMBIN TIME: CPT | Performed by: EMERGENCY MEDICINE

## 2024-11-28 PROCEDURE — 76830 TRANSVAGINAL US NON-OB: CPT

## 2024-11-28 NOTE — ED PROVIDER NOTES
Time reflects when diagnosis was documented in both MDM as applicable and the Disposition within this note       Time User Action Codes Description Comment    2024  2:56 PM Janet Zhang Add [N93.9] Vaginal bleeding     2024  2:56 PM JanetZhang Add [Z98.891] History of vaginal delivery following previous  delivery     2024  2:56 PM JanetZhang Remove [Z98.891] History of vaginal delivery following previous  delivery           ED Disposition       ED Disposition   Discharge    Condition   Stable    Date/Time   Thu 2024  2:56 PM    Comment   Anila Narayanleopoldotatyana discharge to home/self care.                   Assessment & Plan       Medical Decision Making  29-year-old female G3, P3 with vaginal delivery 10 days ago.  Discharged in the hospital 8 days ago stating that increasing vaginal bleeding.  Denies any pain or any other associated symptoms.  Denies any obvious purulence to the discharge.  On examination patient with nontender abdominal exam.  From chart review patient noted to be AB+ from delivery.  Will check baseline labs/coags/tsh/Pelvic US to assess for possible retained products.  All findings with patient and family member at bedside.  State understanding of return precautions and has follow up     Amount and/or Complexity of Data Reviewed  Labs: ordered.  Radiology: ordered.        ED Course as of 24 1507   u 2024   1457 Discussed all findings with on-call OB/GYN who agrees The patient can be discharged with strict return precautions.  Discussed with patient the importance of wearing a pad and if she is saturating through more than 1 pad an hour she needs to immediately come back for further evaluation.  Did discuss the use of NSAIDs to assist in decreasing her bleeding.  Did recommend follow-up with OB/GYN patient states that she actually has an OB/GYN appointment tomorrow as they been monitoring her blood pressure.  Patient and  patient's family member at bedside state they feel comfortable going home at this time and understand all the precautions.       Medications - No data to display    ED Risk Strat Scores                           SBIRT 20yo+      Flowsheet Row Most Recent Value   Initial Alcohol Screen: US AUDIT-C     1. How often do you have a drink containing alcohol? 0 Filed at: 11/28/2024 1303   2. How many drinks containing alcohol do you have on a typical day you are drinking?  0 Filed at: 11/28/2024 1303   3b. FEMALE Any Age, or MALE 65+: How often do you have 4 or more drinks on one occassion? 0 Filed at: 11/28/2024 1303   Audit-C Score 0 Filed at: 11/28/2024 1303   LANG: How many times in the past year have you...    Used an illegal drug or used a prescription medication for non-medical reasons? Never Filed at: 11/28/2024 1303                            History of Present Illness       Chief Complaint   Patient presents with    Vaginal Bleeding     According to the patient she is 2 weeks post-partum and is experiencing large amounts of vaginal bleeding since this morning.       Past Medical History:   Diagnosis Date    Chronic hypertension affecting pregnancy 11/25/2020    Disease of thyroid gland     Gestational diabetes mellitus (GDM) in third trimester 1/11/2021    Hypothyroid     Seasonal allergies     Urinary tract infection     Varicella       Past Surgical History:   Procedure Laterality Date    KNEE CARTILAGE SURGERY Right     WISDOM TOOTH EXTRACTION      WRIST SURGERY Left       Family History   Problem Relation Age of Onset    No Known Problems Mother     Hyperthyroidism Father     Pancreatic cancer Maternal Grandmother     Heart attack Maternal Grandfather     No Known Problems Paternal Grandmother     Kidney failure Paternal Grandfather     Diabetes Paternal Grandfather     Breast cancer Neg Hx     Colon cancer Neg Hx     Ovarian cancer Neg Hx       Social History     Tobacco Use    Smoking status: Former      "Current packs/day: 0.00     Average packs/day: 1 pack/day for 4.0 years (4.0 ttl pk-yrs)     Types: Cigarettes     Start date: 2012     Quit date: 2016     Years since quittin.9    Smokeless tobacco: Never   Vaping Use    Vaping status: Former    Quit date: 2016   Substance Use Topics    Alcohol use: Not Currently     Comment: rarely prior to pregnancy    Drug use: Never      E-Cigarette/Vaping    E-Cigarette Use Former User     Quit Date 16       E-Cigarette/Vaping Substances    Nicotine No     THC No     CBD No     Flavoring No     Other No     Unknown No       I have reviewed and agree with the history as documented.     28 yo  with vaginal delivery 10 days  with DC 8 days ago with continued bleeding with worsening today coming in \"gushes\" and a large clot today. States that yesterday she was lifting a heavy turkey (roughyl 20lbs) but no bleeding yesterday. States she felt something with lifting the turkey.       Vaginal Bleeding      Review of Systems   Genitourinary:  Positive for vaginal bleeding.   All other systems reviewed and are negative.          Objective       ED Triage Vitals [24 1301]   Temperature Pulse Blood Pressure Respirations SpO2 Patient Position - Orthostatic VS   98.2 °F (36.8 °C) 82 144/78 18 99 % Lying      Temp Source Heart Rate Source BP Location FiO2 (%) Pain Score    Temporal -- Right arm -- No Pain      Vitals      Date and Time Temp Pulse SpO2 Resp BP Pain Score FACES Pain Rating User   24 1301 98.2 °F (36.8 °C) 82 99 % 18 144/78 No Pain -- [place]            Physical Exam  Vitals and nursing note reviewed.   Constitutional:       General: She is not in acute distress.     Appearance: She is well-developed. She is not ill-appearing, toxic-appearing or diaphoretic.   HENT:      Head: Normocephalic and atraumatic.      Right Ear: External ear normal.      Left Ear: External ear normal.      Nose: Nose normal.   Eyes:      General: No scleral icterus.   "      Right eye: No discharge.         Left eye: No discharge.      Conjunctiva/sclera: Conjunctivae normal.      Pupils: Pupils are equal, round, and reactive to light.   Neck:      Vascular: No JVD.      Trachea: No tracheal deviation.   Cardiovascular:      Rate and Rhythm: Normal rate and regular rhythm.      Heart sounds: Normal heart sounds. No murmur heard.     No friction rub. No gallop.   Pulmonary:      Effort: Pulmonary effort is normal. No respiratory distress.      Breath sounds: Normal breath sounds. No stridor. No wheezing or rales.   Abdominal:      General: Bowel sounds are normal. There is no distension.      Palpations: Abdomen is soft. There is no mass.      Tenderness: There is no abdominal tenderness. There is no guarding.   Musculoskeletal:         General: No tenderness or deformity. Normal range of motion.      Cervical back: Normal range of motion and neck supple.   Skin:     General: Skin is warm and dry.      Coloration: Skin is not pale.      Findings: No erythema or rash.   Neurological:      Mental Status: She is alert and oriented to person, place, and time.      Cranial Nerves: No cranial nerve deficit.      Sensory: No sensory deficit.      Motor: No abnormal muscle tone.   Psychiatric:         Behavior: Behavior normal.         Thought Content: Thought content normal.         Judgment: Judgment normal.         Results Reviewed       Procedure Component Value Units Date/Time    TSH, 3rd generation with Free T4 reflex [[id number]]  (Normal) Collected: 11/28/24 1337    Lab Status: Final result Specimen: Blood from Arm, Left Updated: 11/28/24 1413     TSH 3RD GENERATON 1.551 uIU/mL     Comprehensive metabolic panel [[id number]]  (Abnormal) Collected: 11/28/24 1337    Lab Status: Final result Specimen: Blood from Arm, Left Updated: 11/28/24 1357     Sodium 134 mmol/L      Potassium 3.9 mmol/L      Chloride 103 mmol/L      CO2 23 mmol/L      ANION GAP 8 mmol/L      BUN 10 mg/dL       Creatinine 0.80 mg/dL      Glucose 84 mg/dL      Calcium 8.0 mg/dL      AST 22 U/L      ALT 20 U/L      Alkaline Phosphatase 80 U/L      Total Protein 6.8 g/dL      Albumin 3.6 g/dL      Total Bilirubin 0.30 mg/dL      eGFR 99 ml/min/1.73sq m     Narrative:      National Kid[employer] Foundation guidelines for Chronic Kidney Disease (CKD):     Stage 1 with normal or high GFR (GFR > 90 mL/min/1.73 square meters)    Stage 2 Mild CKD (GFR = 60-89 mL/min/1.73 square meters)    Stage 3A Moderate CKD (GFR = 45-59 mL/min/1.73 square meters)    Stage 3B Moderate CKD (GFR = 30-44 mL/min/1.73 square meters)    Stage 4 Severe CKD (GFR = 15-29 mL/min/1.73 square meters)    Stage 5 End Stage CKD (GFR <15 mL/min/1.73 square meters)  Note: GFR calculation is accurate only with a steady state creatinine    Protime-INR [[id number]]  (Normal) Collected: 11/28/24 1337    Lab Status: Final result Specimen: Blood from Arm, Left Updated: 11/28/24 1355     Protime 13.7 seconds      INR 1.00    Narrative:      INR Therapeutic Range    Indication                                             INR Range      Atrial Fibrillation                                               2.0-3.0  Hypercoagulable State                                    2.0.2.3  Left Ventricular Asist Device                            2.0-3.0  Mechanical Heart Valve                                  -    Aortic(with afib, MI, embolism, HF, LA enlargement,    and/or coagulopathy)                                     2.0-3.0 (2.5-3.5)     Mitral                                                             2.5-3.5  Prosthetic/Bioprosthetic Heart Valve               2.0-3.0  Venous thromboembolism (VTE: VT, PE        2.0-3.0    APTT [[id number]]  (Normal) Collected: 11/28/24 1337    Lab Status: Final result Specimen: Blood from Arm, Left Updated: 11/28/24 1355     PTT 26 seconds     CBC and differential [[id number]]  (Abnormal) Collected: 11/28/24 1337    Lab Status: Final result  Specimen: Blood from Arm, Left Updated: 11/28/24 1343     WBC 10.21 Thousand/uL      RBC 4.10 Million/uL      Hemoglobin 12.4 g/dL      Hematocrit 38.3 %      MCV 93 fL      MCH 30.2 pg      MCHC 32.4 g/dL      RDW 13.1 %      MPV 9.0 fL      Platelets 383 Thousands/uL      nRBC 0 /100 WBCs      Segmented % 80 %      Immature Grans % 0 %      Lymphocytes % 11 %      Monocytes % 7 %      Eosinophils Relative 1 %      Basophils Relative 1 %      Absolute Neutrophils 8.16 Thousands/µL      Absolute Immature Grans 0.03 Thousand/uL      Absolute Lymphocytes 1.16 Thousands/µL      Absolute Monocytes 0.71 Thousand/µL      Eosinophils Absolute 0.10 Thousand/µL      Basophils Absolute 0.05 Thousands/µL             US pelvis complete w transvaginal   Final Interpretation by Palmira Ha MD (11/28 1440)   Blood clots in the endometrial canal.   No findings to suspect retained products of conception.                           Workstation performed: [id number]             Procedures    ED Medication and Procedure Management   Prior to Admission Medications   Prescriptions Last Dose Informant Patient Reported? Taking?   Blood Glucose Monitoring Suppl (OneTouch Verio Reflect) w/Device KIT   Yes No   Sig: use as directed to TEST BLOOD SUGAR   Insulin Pen Needle (Pen Needles) 31G X 5 MM MISC   No No   Sig: Use 1 daily   Lancets (OneTouch Delica Plus [id number]) MISC   No No   Sig: Use 4 a day. GDM.   NIFEdipine (PROCARDIA XL) 30 mg 24 hr tablet   No No   Sig: Take 1 tablet (30 mg total) by mouth daily   OneTouch Verio test strip   No No   Sig: Test 4 times a day. GDM.   Prenatal Vit-DSS-Fe Fum-FA (Prenatal 19) tablet   Yes No   Sig: Take 1 tablet by mouth daily   benzocaine-menthol-lanolin-aloe (DERMOPLAST) 20-0.5 % topical spray   No No   Sig: Apply 1 Application topically every 6 (six) hours as needed for irritation or mild pain   docusate sodium (COLACE) 100 mg capsule   No No   Sig: Take 1 capsule (100 mg total) by mouth 2  (two) times a day   escitalopram (LEXAPRO) 10 mg tablet   Yes No   Sig: Take 10 mg by mouth daily   hydrocortisone 1 % cream   No No   Sig: Apply 1 Application topically daily as needed for rash or irritation   ibuprofen (MOTRIN) 600 mg tablet   No No   Sig: Take 1 tablet (600 mg total) by mouth every 6 (six) hours as needed for mild pain for up to 3 days   levothyroxine 112 mcg tablet   No No   Sig: take 1 tablet by mouth daily   witch hazel-glycerin (TUCKS) topical pad   No No   Sig: Apply 1 Pad topically every 4 (four) hours as needed for irritation      Facility-Administered Medications: None     Patient's Medications   Discharge Prescriptions    No medications on file     No discharge procedures on file.  ED SEPSIS DOCUMENTATION   Time reflects when diagnosis was documented in both MDM as applicable and the Disposition within this note       Time User Action Codes Description Comment    2024  2:56 PM Zhang Gonzales [N93.9] Vaginal bleeding     2024  2:56 PM Zhang Gonzales Add [Z98.891] History of vaginal delivery following previous  delivery     2024  2:56 PM Zhang Gonzales Remove [Z98.891] History of vaginal delivery following previous  delivery                  Zhang Gonzales DO  24 1508

## 2024-11-29 ENCOUNTER — CLINICAL SUPPORT (OUTPATIENT)
Dept: OBGYN CLINIC | Facility: CLINIC | Age: 29
End: 2024-11-29

## 2024-11-29 DIAGNOSIS — O13.9 PREGNANCY INDUCED HYPERTENSION, ANTEPARTUM: Primary | ICD-10-CM

## 2024-11-29 PROCEDURE — NURSE

## 2024-11-29 NOTE — PROGRESS NOTES
Patient presents for blood pressure check 1 week after delivery. She is taking procardia 30 mg bid. Reading before visit, patient states 117/78, she took reading before manual reading, was 124/86 wrist, sitting, and manual reading was taken right arm, sitting position, large cuff, reading was 126/82. She is scheduled for her postpartum visit on 12/18/24 with Dr. Mendes. Will call office if having headache, vision issues, or upper gastric pain.

## 2024-12-12 RX ORDER — DOCUSATE SODIUM 100 MG/1
100 CAPSULE, LIQUID FILLED ORAL 2 TIMES DAILY
Qty: 60 CAPSULE | Refills: 0 | Status: SHIPPED | OUTPATIENT
Start: 2024-12-12

## 2025-01-07 DIAGNOSIS — O10.919 CHRONIC HYPERTENSION AFFECTING PREGNANCY: ICD-10-CM

## 2025-01-17 ENCOUNTER — APPOINTMENT (EMERGENCY)
Dept: RADIOLOGY | Facility: HOSPITAL | Age: 30
End: 2025-01-17
Payer: COMMERCIAL

## 2025-01-17 ENCOUNTER — HOSPITAL ENCOUNTER (EMERGENCY)
Facility: HOSPITAL | Age: 30
Discharge: HOME | End: 2025-01-17
Attending: EMERGENCY MEDICINE
Payer: COMMERCIAL

## 2025-01-17 VITALS
SYSTOLIC BLOOD PRESSURE: 155 MMHG | DIASTOLIC BLOOD PRESSURE: 75 MMHG | RESPIRATION RATE: 20 BRPM | BODY MASS INDEX: 39.48 KG/M2 | WEIGHT: 230 LBS | TEMPERATURE: 98.3 F | HEART RATE: 53 BPM | OXYGEN SATURATION: 98 %

## 2025-01-17 DIAGNOSIS — R00.1 BRADYCARDIA: Primary | ICD-10-CM

## 2025-01-17 DIAGNOSIS — R42 EPISODIC LIGHTHEADEDNESS: ICD-10-CM

## 2025-01-17 DIAGNOSIS — R07.89 CHEST PRESSURE: ICD-10-CM

## 2025-01-17 LAB
ALBUMIN SERPL BCG-MCNC: 4.2 G/DL (ref 3.5–5)
ALP SERPL-CCNC: 74 U/L (ref 34–104)
ALT SERPL W P-5'-P-CCNC: 15 U/L (ref 7–52)
ANION GAP SERPL CALCULATED.3IONS-SCNC: 6 MMOL/L (ref 4–13)
AST SERPL W P-5'-P-CCNC: 19 U/L (ref 13–39)
BACTERIA UR QL AUTO: ABNORMAL /HPF
BASOPHILS # BLD AUTO: 0.06 THOUSANDS/ΜL (ref 0–0.1)
BASOPHILS NFR BLD AUTO: 1 % (ref 0–1)
BILIRUB SERPL-MCNC: 0.34 MG/DL (ref 0.2–1)
BILIRUB UR QL STRIP: NEGATIVE
BUN SERPL-MCNC: 13 MG/DL (ref 5–25)
CALCIUM SERPL-MCNC: 9.3 MG/DL (ref 8.4–10.2)
CARDIAC TROPONIN I PNL SERPL HS: <2 NG/L (ref ?–50)
CHLORIDE SERPL-SCNC: 102 MMOL/L (ref 96–108)
CLARITY UR: CLEAR
CO2 SERPL-SCNC: 29 MMOL/L (ref 21–32)
COLOR UR: YELLOW
CREAT SERPL-MCNC: 0.86 MG/DL (ref 0.6–1.3)
EOSINOPHIL # BLD AUTO: 0.04 THOUSAND/ΜL (ref 0–0.61)
EOSINOPHIL NFR BLD AUTO: 1 % (ref 0–6)
ERYTHROCYTE [DISTWIDTH] IN BLOOD BY AUTOMATED COUNT: 12.5 % (ref 11.6–15.1)
EXT PREGNANCY TEST URINE: NEGATIVE
EXT. CONTROL: NORMAL
GFR SERPL CREATININE-BSD FRML MDRD: 91 ML/MIN/1.73SQ M
GLUCOSE SERPL-MCNC: 93 MG/DL (ref 65–140)
GLUCOSE UR STRIP-MCNC: NEGATIVE MG/DL
HCT VFR BLD AUTO: 40.8 % (ref 34.8–46.1)
HGB BLD-MCNC: 13 G/DL (ref 11.5–15.4)
HGB UR QL STRIP.AUTO: NEGATIVE
IMM GRANULOCYTES # BLD AUTO: 0.01 THOUSAND/UL (ref 0–0.2)
IMM GRANULOCYTES NFR BLD AUTO: 0 % (ref 0–2)
KETONES UR STRIP-MCNC: NEGATIVE MG/DL
LEUKOCYTE ESTERASE UR QL STRIP: ABNORMAL
LYMPHOCYTES # BLD AUTO: 1.21 THOUSANDS/ΜL (ref 0.6–4.47)
LYMPHOCYTES NFR BLD AUTO: 19 % (ref 14–44)
MCH RBC QN AUTO: 29.4 PG (ref 26.8–34.3)
MCHC RBC AUTO-ENTMCNC: 31.9 G/DL (ref 31.4–37.4)
MCV RBC AUTO: 92 FL (ref 82–98)
MONOCYTES # BLD AUTO: 0.62 THOUSAND/ΜL (ref 0.17–1.22)
MONOCYTES NFR BLD AUTO: 10 % (ref 4–12)
NEUTROPHILS # BLD AUTO: 4.46 THOUSANDS/ΜL (ref 1.85–7.62)
NEUTS SEG NFR BLD AUTO: 69 % (ref 43–75)
NITRITE UR QL STRIP: NEGATIVE
NON-SQ EPI CELLS URNS QL MICRO: ABNORMAL /HPF
NRBC BLD AUTO-RTO: 0 /100 WBCS
PH UR STRIP.AUTO: 7 [PH]
PLATELET # BLD AUTO: 377 THOUSANDS/UL (ref 149–390)
PMV BLD AUTO: 9.2 FL (ref 8.9–12.7)
POTASSIUM SERPL-SCNC: 4.1 MMOL/L (ref 3.5–5.3)
PROT SERPL-MCNC: 7.5 G/DL (ref 6.4–8.4)
PROT UR STRIP-MCNC: NEGATIVE MG/DL
RBC # BLD AUTO: 4.42 MILLION/UL (ref 3.81–5.12)
RBC #/AREA URNS AUTO: ABNORMAL /HPF
SODIUM SERPL-SCNC: 137 MMOL/L (ref 135–147)
SP GR UR STRIP.AUTO: 1.01
TSH SERPL DL<=0.05 MIU/L-ACNC: 1.08 UIU/ML (ref 0.45–4.5)
UROBILINOGEN UR QL STRIP.AUTO: 0.2 E.U./DL
WBC # BLD AUTO: 6.4 THOUSAND/UL (ref 4.31–10.16)
WBC #/AREA URNS AUTO: ABNORMAL /HPF

## 2025-01-17 PROCEDURE — 85025 COMPLETE CBC W/AUTO DIFF WBC: CPT | Performed by: EMERGENCY MEDICINE

## 2025-01-17 PROCEDURE — 93005 ELECTROCARDIOGRAM TRACING: CPT

## 2025-01-17 PROCEDURE — 36415 COLL VENOUS BLD VENIPUNCTURE: CPT | Performed by: EMERGENCY MEDICINE

## 2025-01-17 PROCEDURE — 84443 ASSAY THYROID STIM HORMONE: CPT | Performed by: EMERGENCY MEDICINE

## 2025-01-17 PROCEDURE — 99284 EMERGENCY DEPT VISIT MOD MDM: CPT | Performed by: EMERGENCY MEDICINE

## 2025-01-17 PROCEDURE — 80053 COMPREHEN METABOLIC PANEL: CPT | Performed by: EMERGENCY MEDICINE

## 2025-01-17 PROCEDURE — 81003 URINALYSIS AUTO W/O SCOPE: CPT | Performed by: EMERGENCY MEDICINE

## 2025-01-17 PROCEDURE — 81025 URINE PREGNANCY TEST: CPT | Performed by: EMERGENCY MEDICINE

## 2025-01-17 PROCEDURE — 71045 X-RAY EXAM CHEST 1 VIEW: CPT

## 2025-01-17 PROCEDURE — 99284 EMERGENCY DEPT VISIT MOD MDM: CPT

## 2025-01-17 PROCEDURE — 96360 HYDRATION IV INFUSION INIT: CPT

## 2025-01-17 PROCEDURE — 81001 URINALYSIS AUTO W/SCOPE: CPT | Performed by: EMERGENCY MEDICINE

## 2025-01-17 PROCEDURE — 84484 ASSAY OF TROPONIN QUANT: CPT | Performed by: EMERGENCY MEDICINE

## 2025-01-17 RX ADMIN — SODIUM CHLORIDE 1000 ML: 0.9 INJECTION, SOLUTION INTRAVENOUS at 11:32

## 2025-01-17 NOTE — ED PROVIDER NOTES
Time reflects when diagnosis was documented in both MDM as applicable and the Disposition within this note       Time User Action Codes Description Comment    1/17/2025 12:55 PM Carlos Martell Add [R00.1] Bradycardia     1/17/2025 12:55 PM Carlos Martell Add [R42] Episodic lightheadedness     1/17/2025 12:55 PM Carlos Martell Add [R07.89] Chest pressure           ED Disposition       ED Disposition   Discharge    Condition   Stable    Date/Time   Fri Jan 17, 2025 12:55 PM    Comment   Anila Cheng discharge to home/self care.                   Assessment & Plan       Medical Decision Making  29-year-old female presenting for evaluation of lightheadedness intermittently, chest pressure, fatigue.  Lightheadedness intermittently with last few days, chest pressure started last night.  Admits to mild cough.  No fevers or chills.  No abdominal pain  But is within normal limits here.  Patient does have sinus bradycardia however blood pressures normal.  Will obtain CBC, CMP.  Will obtain cardiac workup though I have low suspicion for ACS but will obtain chest x-ray to rule out pneumonia.  Will check TSH given history of hypothyroid  Labs within normal limits.  Troponin less than 2, since pain has been since last night no need to repeat.  EKG shows no ischemic changes normal intervals.  Chest x-ray shows no acute cardiopulmonary disease.  Patient discharged to home, told to follow-up with family doctor    Problems Addressed:  Bradycardia: acute illness or injury  Chest pressure: acute illness or injury  Episodic lightheadedness: acute illness or injury    Amount and/or Complexity of Data Reviewed  Labs: ordered.  Radiology: ordered.             Medications   sodium chloride 0.9 % bolus 1,000 mL (0 mL Intravenous Stopped 1/17/25 1255)       ED Risk Strat Scores                                              History of Present Illness       Chief Complaint   Patient presents with    Fatigue    Slow Heart Rate       Past  Medical History:   Diagnosis Date    Chronic hypertension affecting pregnancy 2020    Disease of thyroid gland     Gestational diabetes mellitus (GDM) in third trimester 2021    Hypothyroid     Seasonal allergies     Urinary tract infection     Varicella       Past Surgical History:   Procedure Laterality Date    KNEE CARTILAGE SURGERY Right     WISDOM TOOTH EXTRACTION      WRIST SURGERY Left       Family History   Problem Relation Age of Onset    No Known Problems Mother     Hyperthyroidism Father     Pancreatic cancer Maternal Grandmother     Heart attack Maternal Grandfather     No Known Problems Paternal Grandmother     Kidney failure Paternal Grandfather     Diabetes Paternal Grandfather     Breast cancer Neg Hx     Colon cancer Neg Hx     Ovarian cancer Neg Hx       Social History     Tobacco Use    Smoking status: Former     Current packs/day: 0.00     Average packs/day: 1 pack/day for 4.0 years (4.0 ttl pk-yrs)     Types: Cigarettes     Start date: 2012     Quit date: 2016     Years since quittin.0    Smokeless tobacco: Never   Vaping Use    Vaping status: Former    Quit date: 2016   Substance Use Topics    Alcohol use: Not Currently     Comment: rarely prior to pregnancy    Drug use: Never      E-Cigarette/Vaping    E-Cigarette Use Former User     Quit Date 16       E-Cigarette/Vaping Substances    Nicotine No     THC No     CBD No     Flavoring No     Other No     Unknown No       I have reviewed and agree with the history as documented.     Patient is a 29-year-old female who presents for evaluation of intermittent lightheadedness, chest pressure, fatigue.  Patient says that over the last few days she has been having episodes of lightheadedness.  She denies it feeling like a room spinning sensation.  She says that since last night she has had a substernal chest pressure which has been constant.  It does not radiate anywhere else.  Denies any nausea, vomiting with it.  She  "does admit to some mild shortness of breath with it.  Patient says she checked her pulse at home and it was \"low in the 50s.\"        Review of Systems   Constitutional:  Positive for fatigue. Negative for fever and unexpected weight change.   HENT:  Negative for congestion, ear pain, sore throat and trouble swallowing.    Eyes:  Negative for pain and redness.   Respiratory:  Positive for shortness of breath. Negative for cough and chest tightness.    Cardiovascular:  Positive for chest pain. Negative for leg swelling.   Gastrointestinal:  Negative for abdominal distention, abdominal pain, diarrhea and vomiting.   Endocrine: Negative for polyuria.   Genitourinary:  Negative for dysuria, hematuria, pelvic pain and vaginal bleeding.   Musculoskeletal:  Negative for back pain and myalgias.   Skin:  Negative for rash.   Neurological:  Positive for light-headedness. Negative for dizziness, syncope, weakness and headaches.           Objective       ED Triage Vitals [01/17/25 1119]   Temperature Pulse Blood Pressure Respirations SpO2 Patient Position - Orthostatic VS   98.3 °F (36.8 °C) 71 (!) 190/94 20 100 % Lying      Temp Source Heart Rate Source BP Location FiO2 (%) Pain Score    Temporal Monitor Left arm -- No Pain      Vitals      Date and Time Temp Pulse SpO2 Resp BP Pain Score FACES Pain Rating User   01/17/25 1230 -- 53 98 % -- 155/75 -- -- EM   01/17/25 1200 -- 65 99 % -- 157/69 -- -- EM   01/17/25 1145 -- 58 98 % -- 155/77 -- --    01/17/25 1119 98.3 °F (36.8 °C) 71 100 % 20 190/94 No Pain -- JCS            Physical Exam  Vitals and nursing note reviewed.   Constitutional:       General: She is not in acute distress.     Appearance: She is well-developed.   HENT:      Head: Normocephalic and atraumatic.      Right Ear: External ear normal.      Left Ear: External ear normal.      Nose: Nose normal. No congestion or rhinorrhea.      Mouth/Throat:      Mouth: Mucous membranes are moist.      Pharynx: No " oropharyngeal exudate.   Eyes:      Conjunctiva/sclera: Conjunctivae normal.      Pupils: Pupils are equal, round, and reactive to light.   Cardiovascular:      Rate and Rhythm: Normal rate and regular rhythm.      Heart sounds: Normal heart sounds. No murmur heard.     No friction rub. No gallop.   Pulmonary:      Effort: Pulmonary effort is normal. No respiratory distress.      Breath sounds: Normal breath sounds. No wheezing or rales.   Abdominal:      General: There is no distension.      Palpations: Abdomen is soft.      Tenderness: There is no abdominal tenderness. There is no guarding.   Musculoskeletal:         General: No swelling, tenderness or deformity. Normal range of motion.      Cervical back: Normal range of motion and neck supple.   Lymphadenopathy:      Cervical: No cervical adenopathy.   Skin:     General: Skin is warm and dry.   Neurological:      General: No focal deficit present.      Mental Status: She is alert and oriented to person, place, and time. Mental status is at baseline.      Cranial Nerves: No cranial nerve deficit.      Sensory: No sensory deficit.      Motor: No weakness or abnormal muscle tone.      Coordination: Coordination normal.         Results Reviewed       Procedure Component Value Units Date/Time    Urine Microscopic [[id number]]  (Abnormal) Collected: 01/17/25 1130    Lab Status: Final result Specimen: Urine, Clean Catch Updated: 01/17/25 1239     RBC, UA 0-1 /hpf      WBC, UA 2-4 /hpf      Epithelial Cells Moderate /hpf      Bacteria, UA None Seen /hpf     TSH, 3rd generation with Free T4 reflex [[id number]]  (Normal) Collected: 01/17/25 1129    Lab Status: Final result Specimen: Blood from Arm, Left Updated: 01/17/25 1214     TSH 3RD GENERATON 1.084 uIU/mL     HS Troponin 0hr (reflex protocol) [[id number]]  (Normal) Collected: 01/17/25 1139    Lab Status: Final result Specimen: Blood Updated: 01/17/25 1206     hs TnI 0hr <2 ng/L     Comprehensive metabolic panel  [[id number]] Collected: 01/17/25 1129    Lab Status: Final result Specimen: Blood from Arm, Left Updated: 01/17/25 1158     Sodium 137 mmol/L      Potassium 4.1 mmol/L      Chloride 102 mmol/L      CO2 29 mmol/L      ANION GAP 6 mmol/L      BUN 13 mg/dL      Creatinine 0.86 mg/dL      Glucose 93 mg/dL      Calcium 9.3 mg/dL      AST 19 U/L      ALT 15 U/L      Alkaline Phosphatase 74 U/L      Total Protein 7.5 g/dL      Albumin 4.2 g/dL      Total Bilirubin 0.34 mg/dL      eGFR 91 ml/min/1.73sq m     Narrative:      [employer] Kid[employer] guidelines for Chronic Kidney Disease (CKD):     Stage 1 with normal or high GFR (GFR > 90 mL/min/1.73 square meters)    Stage 2 Mild CKD (GFR = 60-89 mL/min/1.73 square meters)    Stage 3A Moderate CKD (GFR = 45-59 mL/min/1.73 square meters)    Stage 3B Moderate CKD (GFR = 30-44 mL/min/1.73 square meters)    Stage 4 Severe CKD (GFR = 15-29 mL/min/1.73 square meters)    Stage 5 End Stage CKD (GFR <15 mL/min/1.73 square meters)  Note: GFR calculation is accurate only with a steady state creatinine    UA (URINE) with reflex to Scope [[id number]]  (Abnormal) Collected: 01/17/25 1130    Lab Status: Final result Specimen: Urine, Clean Catch Updated: 01/17/25 1144     Color, UA Yellow     Clarity, UA Clear     Specific Gravity, UA 1.010     pH, UA 7.0     Leukocytes, UA 1+     Nitrite, UA Negative     Protein, UA Negative mg/dl      Glucose, UA Negative mg/dl      Ketones, UA Negative mg/dl      Urobilinogen, UA 0.2 E.U./dl      Bilirubin, UA Negative     Occult Blood, UA Negative    CBC and differential [[id number]] Collected: 01/17/25 1129    Lab Status: Final result Specimen: Blood from Arm, Left Updated: 01/17/25 1142     WBC 6.40 Thousand/uL      RBC 4.42 Million/uL      Hemoglobin 13.0 g/dL      Hematocrit 40.8 %      MCV 92 fL      MCH 29.4 pg      MCHC 31.9 g/dL      RDW 12.5 %      MPV 9.2 fL      Platelets 377 Thousands/uL      nRBC 0 /100 WBCs      Segmented % 69 %       Immature Grans % 0 %      Lymphocytes % 19 %      Monocytes % 10 %      Eosinophils Relative 1 %      Basophils Relative 1 %      Absolute Neutrophils 4.46 Thousands/µL      Absolute Immature Grans 0.01 Thousand/uL      Absolute Lymphocytes 1.21 Thousands/µL      Absolute Monocytes 0.62 Thousand/µL      Eosinophils Absolute 0.04 Thousand/µL      Basophils Absolute 0.06 Thousands/µL     POCT pregnancy, urine [[id number]]  (Normal) Collected: 01/17/25 1135    Lab Status: Final result Updated: 01/17/25 1135     EXT Preg Test, Ur Negative     Control Valid            XR chest 1 view portable   Final Interpretation by Corby Portillo MD (01/17 1242)      No acute cardiopulmonary disease.            Workstation performed: [id number]             Procedures    ED Medication and Procedure Management   Prior to Admission Medications   Prescriptions Last Dose Informant Patient Reported? Taking?   Blood Glucose Monitoring Suppl (OneTouch Verio Reflect) w/Device KIT   Yes No   Sig: use as directed to TEST BLOOD SUGAR   Insulin Pen Needle (Pen Needles) 31G X 5 MM MISC   No No   Sig: Use 1 daily   Lancets (OneTouch Delica Plus [id number]) MISC   No No   Sig: Use 4 a day. GDM.   NIFEdipine (PROCARDIA XL) 30 mg 24 hr tablet   No No   Sig: Take 1 tablet (30 mg total) by mouth daily   OneTouch Verio test strip   No No   Sig: Test 4 times a day. GDM.   Prenatal Vit-DSS-Fe Fum-FA (Prenatal 19) tablet   Yes No   Sig: Take 1 tablet by mouth daily   benzocaine-menthol-lanolin-aloe (DERMOPLAST) 20-0.5 % topical spray   No No   Sig: Apply 1 Application topically every 6 (six) hours as needed for irritation or mild pain   docusate sodium (COLACE) 100 mg capsule   No No   Sig: take 1 capsule by mouth twice a day   escitalopram (LEXAPRO) 10 mg tablet   Yes No   Sig: Take 10 mg by mouth daily   hydrocortisone 1 % cream   No No   Sig: Apply 1 Application topically daily as needed for rash or irritation   ibuprofen (MOTRIN) 600 mg tablet    No No   Sig: Take 1 tablet (600 mg total) by mouth every 6 (six) hours as needed for mild pain for up to 3 days   levothyroxine 112 mcg tablet   No No   Sig: take 1 tablet by mouth daily   witch hazel-glycerin (TUCKS) topical pad   No No   Sig: Apply 1 Pad topically every 4 (four) hours as needed for irritation      Facility-Administered Medications: None     Discharge Medication List as of 1/17/2025 12:56 PM        CONTINUE these medications which have NOT CHANGED    Details   benzocaine-menthol-lanolin-aloe (DERMOPLAST) 20-0.5 % topical spray Apply 1 Application topically every 6 (six) hours as needed for irritation or mild pain, Starting Tue 11/19/2024, Normal      Blood Glucose Monitoring Suppl (OneTouch Verio Reflect) w/Device KIT use as directed to TEST BLOOD SUGAR, Historical Med      docusate sodium (COLACE) 100 mg capsule take 1 capsule by mouth twice a day, Starting Thu 12/12/2024, Normal      escitalopram (LEXAPRO) 10 mg tablet Take 10 mg by mouth daily, Starting Fri 2/17/2023, Historical Med      hydrocortisone 1 % cream Apply 1 Application topically daily as needed for rash or irritation, Starting Tue 11/19/2024, No Print      ibuprofen (MOTRIN) 600 mg tablet Take 1 tablet (600 mg total) by mouth every 6 (six) hours as needed for mild pain for up to 3 days, Starting Tue 11/19/2024, Until Fri 11/22/2024 at 2359, Normal      Insulin Pen Needle (Pen Needles) 31G X 5 MM MISC Use 1 daily, Normal      Lancets (OneTouch Delica Plus [id number]) MISC Use 4 a day. GDM., Normal      levothyroxine 112 mcg tablet take 1 tablet by mouth daily, Starting Thu 10/24/2024, Normal      NIFEdipine (PROCARDIA XL) 30 mg 24 hr tablet Take 1 tablet (30 mg total) by mouth daily, Starting Wed 11/20/2024, Normal      OneTouch Verio test strip Test 4 times a day. GDM., Normal      Prenatal Vit-DSS-Fe Fum-FA (Prenatal 19) tablet Take 1 tablet by mouth daily, Historical Med      witch hazel-glycerin (TUCKS) topical pad Apply 1 Pad  topically every 4 (four) hours as needed for irritation, Starting Tue 11/19/2024, Normal           No discharge procedures on file.  ED SEPSIS DOCUMENTATION   Time reflects when diagnosis was documented in both MDM as applicable and the Disposition within this note       Time User Action Codes Description Comment    1/17/2025 12:55 PM Carlos Martell Add [R00.1] Bradycardia     1/17/2025 12:55 PM Carlos Martell [R42] Episodic lightheadedness     1/17/2025 12:55 PM Carlos Martell Add [R07.89] Chest pressure                  Carlos Martell DO  01/17/25 1538

## 2025-01-18 LAB
ATRIAL RATE: 71 BPM
P AXIS: 75 DEGREES
PR INTERVAL: 182 MS
QRS AXIS: 105 DEGREES
QRSD INTERVAL: 82 MS
QT INTERVAL: 402 MS
QTC INTERVAL: 436 MS
T WAVE AXIS: 69 DEGREES
VENTRICULAR RATE: 71 BPM

## 2025-01-18 PROCEDURE — 93010 ELECTROCARDIOGRAM REPORT: CPT | Performed by: INTERNAL MEDICINE

## 2025-01-20 ENCOUNTER — TELEPHONE (OUTPATIENT)
Age: 30
End: 2025-01-20

## 2025-01-24 ENCOUNTER — POSTPARTUM VISIT (OUTPATIENT)
Dept: OBGYN CLINIC | Facility: MEDICAL CENTER | Age: 30
End: 2025-01-24
Payer: COMMERCIAL

## 2025-01-24 VITALS
HEIGHT: 64 IN | SYSTOLIC BLOOD PRESSURE: 130 MMHG | WEIGHT: 229.1 LBS | DIASTOLIC BLOOD PRESSURE: 80 MMHG | BODY MASS INDEX: 39.11 KG/M2

## 2025-01-24 DIAGNOSIS — N93.9 ABNORMAL UTERINE BLEEDING (AUB): ICD-10-CM

## 2025-01-24 PROCEDURE — 99213 OFFICE O/P EST LOW 20 MIN: CPT | Performed by: OBSTETRICS & GYNECOLOGY

## 2025-01-24 NOTE — PROGRESS NOTES
"Postpartum Visit   [facility] [address], [place], [place]    Assessment/Plan:  Anila is a 29 y.o. year old  who presents for postpartum visit.    Routine Postpartum Care  - Normal postpartum exam  - Contraception: none  - Depression Screen: negative  - Feeding: formula    Additional Problems:  1. Postpartum exam  2. Abnormal uterine bleeding (AUB)        Subjective:     CC: Postpartum visit    Anila Cheng is a 29 y.o. female  who presents for a postpartum visit.     She is approximately 2 months postpartum following a  on  at 39 weeks. Postpartum course has been uncomplicated. Bleeding no bleeding. Bowel function is normal. Bladder function is normal. Patient is not sexually active. Contraception method is none. Postpartum depression screening: negative.    Patient states she started getting her cycle. States they are heavy with increased clotting and pain.  is looking to get vasectomy. Discussed options with patient. Interested in definitive treatment. Will follow up with Dr. Porter.     The following portions of the patient's history were reviewed and updated as appropriate: allergies, current medications, past family history, past medical history, obstetric history, gynecologic history, past social history, past surgical history and problem list.      Objective:  /80   Ht 5' 4\" (1.626 m)   Wt 104 kg (229 lb 1.6 oz)   Breastfeeding Yes   BMI 39.32 kg/m²   Pregravid Weight/BMI: No episode found (BMI Could not be calculated)  Current Weight: 104 kg (229 lb 1.6 oz)   Total Weight Gain: Not found.   Pre-Herbert Vitals      Flowsheet Row Most Recent Value   Prenatal Assessment    Prenatal Vitals    Blood Pressure 130/80   Weight - Scale 104 kg (229 lb 1.6 oz)   Urine Albumin/Glucose    Dilation/Effacement/Station    Vaginal Drainage    Edema              General: Well appearing, no distress.  Mood and affect: " Appropriate.  Respiratory: Unlabored breathing. Clear to auscultate.  Cardiovascular: Regular rate and rhythm.  Abdomen: Soft, nontender    Extremities: Warm and well perfused.  Non tender.

## 2025-01-28 RX ORDER — NIFEDIPINE 30 MG/1
30 TABLET, EXTENDED RELEASE ORAL DAILY
Qty: 30 TABLET | Refills: 1 | Status: SHIPPED | OUTPATIENT
Start: 2025-01-28

## 2025-04-16 DIAGNOSIS — E03.9 ACQUIRED HYPOTHYROIDISM: ICD-10-CM

## 2025-04-16 RX ORDER — LEVOTHYROXINE SODIUM 112 UG/1
112 TABLET ORAL DAILY
Qty: 30 TABLET | Refills: 5 | Status: SHIPPED | OUTPATIENT
Start: 2025-04-16

## 2025-07-03 DIAGNOSIS — E03.9 ACQUIRED HYPOTHYROIDISM: ICD-10-CM

## 2025-07-06 RX ORDER — LEVOTHYROXINE SODIUM 112 UG/1
112 TABLET ORAL DAILY
Qty: 30 TABLET | Refills: 5 | Status: SHIPPED | OUTPATIENT
Start: 2025-07-06